# Patient Record
Sex: FEMALE | Race: BLACK OR AFRICAN AMERICAN
[De-identification: names, ages, dates, MRNs, and addresses within clinical notes are randomized per-mention and may not be internally consistent; named-entity substitution may affect disease eponyms.]

---

## 2019-10-11 ENCOUNTER — APPOINTMENT (OUTPATIENT)
Dept: ORTHOPEDIC SURGERY | Facility: CLINIC | Age: 79
End: 2019-10-11
Payer: MEDICARE

## 2019-10-11 VITALS — WEIGHT: 238 LBS | BODY MASS INDEX: 49.96 KG/M2 | HEIGHT: 58 IN

## 2019-10-11 DIAGNOSIS — Z78.9 OTHER SPECIFIED HEALTH STATUS: ICD-10-CM

## 2019-10-11 PROBLEM — Z00.00 ENCOUNTER FOR PREVENTIVE HEALTH EXAMINATION: Status: ACTIVE | Noted: 2019-10-11

## 2019-10-11 PROCEDURE — 99203 OFFICE O/P NEW LOW 30 MIN: CPT

## 2019-10-11 PROCEDURE — 73560 X-RAY EXAM OF KNEE 1 OR 2: CPT | Mod: 50

## 2019-10-11 NOTE — PHYSICAL EXAM
[de-identified] : \par Physical Examination\par General: well nourished, in no acute distress, alert and oriented to person, place and time\par Psychiatric: normal mood and affect, no abnormal movements or speech patterns\par Eyes: vision intact With glasses\par Throat: no thyromegaly\par Lymph: no enlarged nodes, no lymphedema in extremity\par Respiratory: no wheezing, no shortness of breath with ambulation\par Cardiac: no cardiac leg swelling, 2+ peripheral pulses\par Neurology: normal gross sensation in extremities to light touch\par Abdomen: soft, non-tender, tympanic, no masses\par \par Musculoskeletal Examination\par Ambulation	Wheelchair bound\par limited exam\par Knee			Right			Left\par General\par      Swelling/Deformity	normal			normal	\par      Skin			normal			normal\par      Erythema		-			-\par      Standing Alignment	neutral			neutral\par      Effusion		none			none\par Range of Motion&\par  \par \par Sensation\par      Deep Peroneal	normal			normal\par      Superficial Peroneal 	normal			normal\par      Sural  		normal			normal\par      Posterior Tibial 	normal			normal\par      Saphneous 		normal			normal\par Pulses\par      DP			2+			2+\par \par  [de-identified] : \par 2 views of the affected bilateral knee (supine AP, supine 0degree lateral,)\par were ordered, obtained and evaluated by myself today and\par demonstrate:\par \par RIGHT\par There is Severe Symmetric narrowing\par Moderate/Large osteophytic lipping\par Trace suprapatellar effusion\par Normal soft tissue density\par demineralized bone\par \par LEFT\par There is Severe Symmetric narrowing\par Moderate/Large osteophytic lipping\par Trace suprapatellar effusion\par Normal soft tissue density\par demineralized bone\par \par

## 2019-10-11 NOTE — DISCUSSION/SUMMARY
[de-identified] : Discussion/Summary\par Bilateral knee severe osteoarthritis\par \par The patient and I discussed the causes and progression of degenerative joint disease of the knee. Models, diagrams and drawings were used in the discussion. Treatment can include conservative non-operative management and surgical options. Conservative management includes weight loss, activity modification, physical therapy to improve motion and strength in the muscles around the knee and the body's core, PO and topical NSAIDs, corticosteroid and/or viscosupplementation intra-articular injections. If the patient fails to improve with non-operative management, surgical management is possible. Depending upon the patient's age, BMI, activity level, degree and location of arthrosis different surgical options are possible including arthroscopic debridement with chondroplasty, high-tibial osteotomy, unicondylar/partial arthroplasty, and total joint arthroplasty.\par \par given non-ambulatory status and patient age/height and BMI, i recommend patient see a total knee replacement specialist. I believe she will have best result w TKA specilaist\par \par recommended Dr Kendall.\par \par The patient verifies their understanding the the visit, diagnosis and plan. They agree with the treatment plan and will contact the office with any questions or problems.\par Follow up\par PRN. \par

## 2019-10-11 NOTE — HISTORY OF PRESENT ILLNESS
[de-identified] : CC Bilateral knee\par \par HP KEELY , 79 year old F RHD 6 months s/p bilateral ankle surgery presenting with chronic onset of 9 years of activity related pain in the anterior Bilateral knee without injury.  Patient has been wheelchair bound for the past two years.  The pain is worse, and rated a 10# out of 10, described as Aching /Sharp, without radiation. Rest makes the pain better and bending/standing makes the pain worse. The patient reports associated symptoms of weakness/swelling. The patient Reports pain at night affecting sleep, and reports similar pain previously.\par \par The patient has tried the following treatments:\par Activity modification	+ Wheelchair x 2 years\par Ice/Compression  	               + \par Braces    		                -\par Nsaids    		                +\par Physical Therapy  	               +\par Cortisone Injection	               + Left only 1 month ago 3 weeks 10%\par Visco Injection		-\par Arthroscopy		-\par \par Review of Systems is positive for the above musculoskeletal symptoms and is otherwise non-contributory for general, constitutional, psychiatric, neurologic, HEENT, cardiac, respiratory, gastrointestinal, reproductive, lymphatic, and dermatologic complaints.\par \par Consult by Dr Shekhar Campoverde\par

## 2019-11-14 ENCOUNTER — APPOINTMENT (OUTPATIENT)
Dept: ORTHOPEDIC SURGERY | Facility: CLINIC | Age: 79
End: 2019-11-14
Payer: MEDICARE

## 2019-11-14 VITALS — DIASTOLIC BLOOD PRESSURE: 94 MMHG | HEART RATE: 86 BPM | SYSTOLIC BLOOD PRESSURE: 156 MMHG

## 2019-11-14 DIAGNOSIS — M17.0 BILATERAL PRIMARY OSTEOARTHRITIS OF KNEE: ICD-10-CM

## 2019-11-14 DIAGNOSIS — Z78.9 OTHER SPECIFIED HEALTH STATUS: ICD-10-CM

## 2019-11-14 DIAGNOSIS — Z99.3 DEPENDENCE ON WHEELCHAIR: ICD-10-CM

## 2019-11-14 PROCEDURE — 99214 OFFICE O/P EST MOD 30 MIN: CPT

## 2019-11-18 PROBLEM — Z78.9 DENIES ALCOHOL CONSUMPTION: Status: ACTIVE | Noted: 2019-11-18

## 2019-11-18 PROBLEM — Z99.3 WHEELCHAIR BOUND: Status: ACTIVE | Noted: 2019-11-18

## 2019-11-18 PROBLEM — M17.0 PRIMARY OSTEOARTHRITIS OF BOTH KNEES: Status: ACTIVE | Noted: 2019-10-11

## 2019-11-18 NOTE — HISTORY OF PRESENT ILLNESS
[Worsening] : worsening [de-identified] : Ms. KEELY GOMEZ is a 79 year old female presents for evaluation of bilateral knee pain. She notes she has had pain for many years, now worsened since 2018. She notes she has been unable to bend her knees since April of this year. She notes she has been in a rehab facility, following ankle surgery, and has since seen a decrease in mobility of the knees since the ankle surgery.  She notes she has anterior knee pain, constant, worsened by flexion of the knees. She notes she no longer walks due to the limitation in movement and pain. She transfers with a Codie lift. She admits to physical therapy in the past, along with a cortisone injection one month ago with only mild relief for a couple days. \par She presents to discuss total knee replacement.

## 2019-11-18 NOTE — DISCUSSION/SUMMARY
[de-identified] : Bilateral knee advanced degenerative joint disease, wheelchair bound, with extension contractures. \par The natural history and treatment of degenerative arthritis was discussed with the patient at length today. The spectrum of treatment including nonoperative modalities to surgical intervention was elucidated. Noninvasive and nonoperative treatment modalities include weight reduction, activity modification with low impact exercise,  as needed use of acetaminophen or anti-inflammatory medications if tolerated, glucosamine/chondroitin supplements, and physical therapy. Further treatments can include corticosteroid injection and the use of viscosupplementation with hyaluronic acid injections. Definitive surgical treatment can certainly include total joint arthroplasty also. The risks and benefits of each treatment options was discussed and all questions were answered.\par At this time we discussed her condition in detail, and in my opinion she is not a candidate for surgery.  We dicussed that she is not likely to regain motion in the knees, as she has had contractures for a long period of time.  She has been advised that surgery will likely not change her status, and likely make her worse. She has been recommended to continue with conservative management of her symptoms. She states she would like to see another physician at [facility], and I recommended getting a couple different opinions. \par She may follow up as needed. \par

## 2019-11-18 NOTE — PHYSICAL EXAM
[Wheelchair] : uses a wheelchair [de-identified] : On general examination the patient is adequately groomed and nourished. The patient arrives in a wheelchair. The vital parameters are as recorded. \par There is no lymphedema or diffuse swelling, no varicose veins, no skin warmth/erythema/scars/swelling, no ulcers and no palpable lymph nodes or masses in both lower extremities. Bilateral pedal pulses are well palpable.\par Upper Extremity:\par Both right and left upper extremities are unremarkable in terms of skin rash, lesions, pigmentation, redness, tenderness, swelling, joint instability, abnormal deformity or crepitus. The overall range of motion, sensation, motor tone and strength testing are normal.\par \par Bilateral Knee Exam: \par The gait is not assessed. \par Knee alignment:      slightly varus. extension contractures. \par Both knees demonstrate no scars and the skin has no warmth, erythema, swelling or tenderness. \par Both knees have a range of motion of\par Extension:                    Right 0 degrees     Left 0 degrees\par Flexion:                                   Right 15 degrees      Left 15 degrees\par There is bilateral knee medial joint line tenderness. no effusion\par There is no mediolateral laxity and no anteroposterior instability. \par Patella compression test is positive and patellofemoral tracking is normal with no lateral subluxation, apprehension or instability. \par Right knee quadriceps and hamstrings power is 4+.\par Left knee quadriceps and hamstrings power is 4+.\par \par Neurology:\par The patient is alert and oriented in person, place and time. The mood is calm and affect is normal.\par Testing for coordination including Rhomberg's Test and Finger-Nose Test, sensation, motor tone and power and deep tendon reflexes in both lower extremities is normal.\par  [de-identified] : Imaging reviewed taken previously. \par Radiographs of the bilateral knees reveal advanced degenerative joint disease with medial and lateral joint line narrowing, osteophyte formation. There is demineralized bone secondary to nonweightbearing status.

## 2020-06-16 ENCOUNTER — INPATIENT (INPATIENT)
Facility: HOSPITAL | Age: 80
LOS: 6 days | Discharge: NURSING, REHAB OR LONG-TERM CARE | DRG: 177 | End: 2020-06-23
Attending: INTERNAL MEDICINE | Admitting: INTERNAL MEDICINE
Payer: MEDICARE

## 2020-06-16 VITALS
HEART RATE: 89 BPM | HEIGHT: 64 IN | WEIGHT: 184.97 LBS | RESPIRATION RATE: 16 BRPM | OXYGEN SATURATION: 98 % | TEMPERATURE: 100 F

## 2020-06-16 DIAGNOSIS — Z71.89 OTHER SPECIFIED COUNSELING: ICD-10-CM

## 2020-06-16 DIAGNOSIS — N17.9 ACUTE KIDNEY FAILURE, UNSPECIFIED: ICD-10-CM

## 2020-06-16 DIAGNOSIS — D52.9 FOLATE DEFICIENCY ANEMIA, UNSPECIFIED: ICD-10-CM

## 2020-06-16 DIAGNOSIS — K21.9 GASTRO-ESOPHAGEAL REFLUX DISEASE WITHOUT ESOPHAGITIS: ICD-10-CM

## 2020-06-16 DIAGNOSIS — K59.00 CONSTIPATION, UNSPECIFIED: ICD-10-CM

## 2020-06-16 DIAGNOSIS — J18.9 PNEUMONIA, UNSPECIFIED ORGANISM: ICD-10-CM

## 2020-06-16 DIAGNOSIS — I10 ESSENTIAL (PRIMARY) HYPERTENSION: ICD-10-CM

## 2020-06-16 DIAGNOSIS — H04.321 ACUTE DACRYOCYSTITIS OF RIGHT LACRIMAL PASSAGE: ICD-10-CM

## 2020-06-16 DIAGNOSIS — Z29.9 ENCOUNTER FOR PROPHYLACTIC MEASURES, UNSPECIFIED: ICD-10-CM

## 2020-06-16 DIAGNOSIS — E87.0 HYPEROSMOLALITY AND HYPERNATREMIA: ICD-10-CM

## 2020-06-16 DIAGNOSIS — Z90.710 ACQUIRED ABSENCE OF BOTH CERVIX AND UTERUS: Chronic | ICD-10-CM

## 2020-06-16 DIAGNOSIS — Z98.890 OTHER SPECIFIED POSTPROCEDURAL STATES: Chronic | ICD-10-CM

## 2020-06-16 LAB
ALBUMIN SERPL ELPH-MCNC: 3.1 G/DL — LOW (ref 3.5–5)
ALP SERPL-CCNC: 59 U/L — SIGNIFICANT CHANGE UP (ref 40–120)
ALT FLD-CCNC: 14 U/L DA — SIGNIFICANT CHANGE UP (ref 10–60)
ANION GAP SERPL CALC-SCNC: 4 MMOL/L — LOW (ref 5–17)
APPEARANCE UR: CLEAR — SIGNIFICANT CHANGE UP
APTT BLD: 34.7 SEC — SIGNIFICANT CHANGE UP (ref 27.5–36.3)
AST SERPL-CCNC: 24 U/L — SIGNIFICANT CHANGE UP (ref 10–40)
BILIRUB SERPL-MCNC: 0.8 MG/DL — SIGNIFICANT CHANGE UP (ref 0.2–1.2)
BILIRUB UR-MCNC: NEGATIVE — SIGNIFICANT CHANGE UP
BUN SERPL-MCNC: 35 MG/DL — HIGH (ref 7–18)
CALCIUM SERPL-MCNC: 8.3 MG/DL — LOW (ref 8.4–10.5)
CHLORIDE SERPL-SCNC: 100 MMOL/L — SIGNIFICANT CHANGE UP (ref 96–108)
CO2 SERPL-SCNC: 43 MMOL/L — HIGH (ref 22–31)
COLOR SPEC: YELLOW — SIGNIFICANT CHANGE UP
CREAT SERPL-MCNC: 1.73 MG/DL — HIGH (ref 0.5–1.3)
DIFF PNL FLD: NEGATIVE — SIGNIFICANT CHANGE UP
GLUCOSE SERPL-MCNC: 82 MG/DL — SIGNIFICANT CHANGE UP (ref 70–99)
GLUCOSE UR QL: NEGATIVE — SIGNIFICANT CHANGE UP
HCT VFR BLD CALC: 41.9 % — SIGNIFICANT CHANGE UP (ref 34.5–45)
HGB BLD-MCNC: 12 G/DL — SIGNIFICANT CHANGE UP (ref 11.5–15.5)
INR BLD: 1.28 RATIO — HIGH (ref 0.88–1.16)
KETONES UR-MCNC: NEGATIVE — SIGNIFICANT CHANGE UP
LEUKOCYTE ESTERASE UR-ACNC: NEGATIVE — SIGNIFICANT CHANGE UP
MCHC RBC-ENTMCNC: 28.6 GM/DL — LOW (ref 32–36)
MCHC RBC-ENTMCNC: 29 PG — SIGNIFICANT CHANGE UP (ref 27–34)
MCV RBC AUTO: 101.2 FL — HIGH (ref 80–100)
NITRITE UR-MCNC: NEGATIVE — SIGNIFICANT CHANGE UP
NRBC # BLD: 0 /100 WBCS — SIGNIFICANT CHANGE UP (ref 0–0)
NT-PROBNP SERPL-SCNC: 374 PG/ML — SIGNIFICANT CHANGE UP (ref 0–450)
PH UR: 5 — SIGNIFICANT CHANGE UP (ref 5–8)
PLATELET # BLD AUTO: 209 K/UL — SIGNIFICANT CHANGE UP (ref 150–400)
POTASSIUM SERPL-MCNC: 3.7 MMOL/L — SIGNIFICANT CHANGE UP (ref 3.5–5.3)
POTASSIUM SERPL-SCNC: 3.7 MMOL/L — SIGNIFICANT CHANGE UP (ref 3.5–5.3)
PROT SERPL-MCNC: 6.9 G/DL — SIGNIFICANT CHANGE UP (ref 6–8.3)
PROT UR-MCNC: NEGATIVE — SIGNIFICANT CHANGE UP
PROTHROM AB SERPL-ACNC: 14.6 SEC — HIGH (ref 10–12.9)
RBC # BLD: 4.14 M/UL — SIGNIFICANT CHANGE UP (ref 3.8–5.2)
RBC # FLD: 14 % — SIGNIFICANT CHANGE UP (ref 10.3–14.5)
SODIUM SERPL-SCNC: 147 MMOL/L — HIGH (ref 135–145)
SP GR SPEC: 1.01 — SIGNIFICANT CHANGE UP (ref 1.01–1.02)
TROPONIN I SERPL-MCNC: <0.015 NG/ML — SIGNIFICANT CHANGE UP (ref 0–0.04)
UROBILINOGEN FLD QL: NEGATIVE — SIGNIFICANT CHANGE UP
WBC # BLD: 4.99 K/UL — SIGNIFICANT CHANGE UP (ref 3.8–10.5)
WBC # FLD AUTO: 4.99 K/UL — SIGNIFICANT CHANGE UP (ref 3.8–10.5)

## 2020-06-16 PROCEDURE — 99285 EMERGENCY DEPT VISIT HI MDM: CPT

## 2020-06-16 PROCEDURE — 74176 CT ABD & PELVIS W/O CONTRAST: CPT | Mod: 26

## 2020-06-16 PROCEDURE — 71250 CT THORAX DX C-: CPT | Mod: 26

## 2020-06-16 PROCEDURE — 71045 X-RAY EXAM CHEST 1 VIEW: CPT | Mod: 26

## 2020-06-16 RX ORDER — HYDROCORTISONE 1 %
1 OINTMENT (GRAM) TOPICAL AT BEDTIME
Refills: 0 | Status: DISCONTINUED | OUTPATIENT
Start: 2020-06-16 | End: 2020-06-23

## 2020-06-16 RX ORDER — MULTIVIT-MIN/FERROUS GLUCONATE 9 MG/15 ML
1 LIQUID (ML) ORAL DAILY
Refills: 0 | Status: DISCONTINUED | OUTPATIENT
Start: 2020-06-16 | End: 2020-06-23

## 2020-06-16 RX ORDER — SODIUM CHLORIDE 9 MG/ML
500 INJECTION INTRAMUSCULAR; INTRAVENOUS; SUBCUTANEOUS ONCE
Refills: 0 | Status: COMPLETED | OUTPATIENT
Start: 2020-06-16 | End: 2020-06-16

## 2020-06-16 RX ORDER — ASPIRIN/CALCIUM CARB/MAGNESIUM 324 MG
325 TABLET ORAL DAILY
Refills: 0 | Status: DISCONTINUED | OUTPATIENT
Start: 2020-06-16 | End: 2020-06-23

## 2020-06-16 RX ORDER — HEPARIN SODIUM 5000 [USP'U]/ML
5000 INJECTION INTRAVENOUS; SUBCUTANEOUS EVERY 8 HOURS
Refills: 0 | Status: DISCONTINUED | OUTPATIENT
Start: 2020-06-16 | End: 2020-06-23

## 2020-06-16 RX ORDER — CHOLECALCIFEROL (VITAMIN D3) 125 MCG
1000 CAPSULE ORAL DAILY
Refills: 0 | Status: DISCONTINUED | OUTPATIENT
Start: 2020-06-16 | End: 2020-06-23

## 2020-06-16 RX ORDER — AMPICILLIN SODIUM AND SULBACTAM SODIUM 250; 125 MG/ML; MG/ML
3 INJECTION, POWDER, FOR SUSPENSION INTRAMUSCULAR; INTRAVENOUS ONCE
Refills: 0 | Status: COMPLETED | OUTPATIENT
Start: 2020-06-16 | End: 2020-06-16

## 2020-06-16 RX ORDER — AZITHROMYCIN 500 MG/1
500 TABLET, FILM COATED ORAL EVERY 24 HOURS
Refills: 0 | Status: DISCONTINUED | OUTPATIENT
Start: 2020-06-16 | End: 2020-06-22

## 2020-06-16 RX ORDER — ONDANSETRON 8 MG/1
4 TABLET, FILM COATED ORAL ONCE
Refills: 0 | Status: DISCONTINUED | OUTPATIENT
Start: 2020-06-16 | End: 2020-06-23

## 2020-06-16 RX ORDER — FOLIC ACID 0.8 MG
1 TABLET ORAL DAILY
Refills: 0 | Status: DISCONTINUED | OUTPATIENT
Start: 2020-06-16 | End: 2020-06-23

## 2020-06-16 RX ORDER — MORPHINE SULFATE 50 MG/1
2 CAPSULE, EXTENDED RELEASE ORAL ONCE
Refills: 0 | Status: DISCONTINUED | OUTPATIENT
Start: 2020-06-16 | End: 2020-06-16

## 2020-06-16 RX ORDER — PANTOPRAZOLE SODIUM 20 MG/1
40 TABLET, DELAYED RELEASE ORAL
Refills: 0 | Status: DISCONTINUED | OUTPATIENT
Start: 2020-06-16 | End: 2020-06-23

## 2020-06-16 RX ORDER — LEVOTHYROXINE SODIUM 125 MCG
75 TABLET ORAL DAILY
Refills: 0 | Status: DISCONTINUED | OUTPATIENT
Start: 2020-06-16 | End: 2020-06-18

## 2020-06-16 RX ORDER — CIPROFLOXACIN HCL 0.3 %
1 DROPS OPHTHALMIC (EYE)
Refills: 0 | Status: DISCONTINUED | OUTPATIENT
Start: 2020-06-16 | End: 2020-06-23

## 2020-06-16 RX ORDER — CEFTRIAXONE 500 MG/1
1000 INJECTION, POWDER, FOR SOLUTION INTRAMUSCULAR; INTRAVENOUS EVERY 24 HOURS
Refills: 0 | Status: COMPLETED | OUTPATIENT
Start: 2020-06-16 | End: 2020-06-20

## 2020-06-16 RX ORDER — ACETAMINOPHEN 500 MG
975 TABLET ORAL ONCE
Refills: 0 | Status: COMPLETED | OUTPATIENT
Start: 2020-06-16 | End: 2020-06-16

## 2020-06-16 RX ORDER — POLYETHYLENE GLYCOL 3350 17 G/17G
17 POWDER, FOR SOLUTION ORAL DAILY
Refills: 0 | Status: DISCONTINUED | OUTPATIENT
Start: 2020-06-16 | End: 2020-06-19

## 2020-06-16 RX ORDER — ACETAMINOPHEN 500 MG
650 TABLET ORAL EVERY 4 HOURS
Refills: 0 | Status: DISCONTINUED | OUTPATIENT
Start: 2020-06-16 | End: 2020-06-23

## 2020-06-16 RX ADMIN — Medication 975 MILLIGRAM(S): at 17:32

## 2020-06-16 RX ADMIN — CEFTRIAXONE 100 MILLIGRAM(S): 500 INJECTION, POWDER, FOR SOLUTION INTRAMUSCULAR; INTRAVENOUS at 21:37

## 2020-06-16 RX ADMIN — HEPARIN SODIUM 5000 UNIT(S): 5000 INJECTION INTRAVENOUS; SUBCUTANEOUS at 21:31

## 2020-06-16 RX ADMIN — AMPICILLIN SODIUM AND SULBACTAM SODIUM 200 GRAM(S): 250; 125 INJECTION, POWDER, FOR SUSPENSION INTRAMUSCULAR; INTRAVENOUS at 17:32

## 2020-06-16 RX ADMIN — AZITHROMYCIN 255 MILLIGRAM(S): 500 TABLET, FILM COATED ORAL at 23:08

## 2020-06-16 RX ADMIN — SODIUM CHLORIDE 500 MILLILITER(S): 9 INJECTION INTRAMUSCULAR; INTRAVENOUS; SUBCUTANEOUS at 17:33

## 2020-06-16 NOTE — H&P ADULT - PROBLEM SELECTOR PLAN 2
-presented with discharge from both eyes with no noted conjunctivel erythema however has eema of eyelids s/o blepharitis with swelling at lateral aspect of R eye s/o dacrocystitis  - -presented with discharge from both eyes with no noted conjunctivel erythema however has edema of eyelids s/o blepharitis with swelling at lateral aspect of R eye with concern for dacrocystitis  -on iv antibiotics as above for CAP, will start cipro eye drops+aritifical tears  -f/u food allergy and RAST tests

## 2020-06-16 NOTE — ED ADULT NURSE NOTE - NSFALLRSKASSESSDT_ED_ALL_ED
16-Jun-2020 14:26

## 2020-06-16 NOTE — ED PROVIDER NOTE - OBJECTIVE STATEMENT
79 year old female that has a history of htn, anemia, gerd, pressure ulcers, arthritis and copd came to the ED because of worsening SOB with decreased appetite, and swelling next to the right eye. No chest pain, no abd pain, no urinary complaints, no rash, no headache.

## 2020-06-16 NOTE — H&P ADULT - PROBLEM SELECTOR PLAN 6
pt reports 10 day hx of constipation and poor appetite  ct scan with no fecal load/impaction  will start on miralax for now

## 2020-06-16 NOTE — ED ADULT TRIAGE NOTE - CCCP TRG CHIEF CMPLNT
57yo G0 w/ known stage IV endometrial adenocarcinoma s/p staging surgery '18 and 1 round of chemotherapy 2/19 admitted for management of symptomatic rectovaginal and enterovaginal fistulas, now with complex fistula associated bacteruria, hemodynamically stable and afebrile    1. ID: urine culture from 2/27 positive for ESBL E. coli, associated due to complex fistula, on bactrim DS BID on 3/1, repeat urine cx 3/6 w/ MRSA and proteus, ID consulted, awaiting recommendations  2. FEN/GI - NPO, IVH, replete electrolytes PRN, octreotide daily on TPN, s/p PICC placement 2/27, GI following recs appreciated, electrolytes repleted via TPN  3. PAIN - well controlled, tylenol, oxycodone, motrin PRN  4.  - joseph in place, intermittent periods of leakage of scant amount of stool per vagina, general surgery consulted for recommendations on management of rectovaginal/enterovaginal fistulas, no surgical intervention at this time, continue with nystatin powder and ointment for mons and labial irritation as well as zinc oxide  5. Endocrine- ISS while patient remains NPO, holding home metformin at this time  6. RESP-  Saturating well on room air, encouraged ISS  8. VTE prophylaxis - SCDs, ambulate as tolerated with PT, Lovenox 40qd, PT    9. PT recommending L[facility]O, pt initially did not want to go, now amenable  - PT consulted to walk patient, as patient needs to be ambulating daily and daily exercises   10. Palliative: palliative consulted, appreciate recommendations  - radiation oncology consulted, recommended biopsy to confirm recurrence before considering radiation therapy  - continue with Lexapro 10mg qhs for depressed mood shortness of breath

## 2020-06-16 NOTE — ED PROVIDER NOTE - CARE PLAN
Principal Discharge DX:	Dacryocystitis, acute, right  Secondary Diagnosis:	Failure to thrive in adult  Secondary Diagnosis:	Renal insufficiency

## 2020-06-16 NOTE — H&P ADULT - PROBLEM SELECTOR PLAN 1
-presented with dyspnea, -presented with dyspnea,, poor intake, afebrile, with no leucocytosis  -CT chest noted with consolidation/bronchogram in RLL  -Will start wilberto mcmanus  -F/u blood cultures, legionella and strep Ags -presented with dyspnea,, poor intake, afebrile, with no leucocytosis  -CT chest noted with consolidation/bronchogram in RLL  -Will start azithro, Rocephin  -Monitor respiratory status  -F/u blood cultures, legionella and strep Ags -presented with dyspnea,, poor intake, afebrile, with no leucocytosis, physical exam with pedal edema  -ekg noted NSR with no acute st-tw changes, trop negative, pro bnp 300  -CT chest noted with consolidation/bronchogram in RLL  -Will start azithro, Rocephin  -Monitor respiratory status, f/u echo  -F/u blood cultures, legionella and strep Ags

## 2020-06-16 NOTE — ED ADULT NURSE NOTE - OBJECTIVE STATEMENT
pt comes from [facility] w/ concern of swollen R eye x1 week and shortness of breath for months. Pt is poor historian.  Speaking in full clear sentences, breathing unlabored.  O2 mid-high 90's on 2 ltr n/c.

## 2020-06-16 NOTE — ED PROVIDER NOTE - CLINICAL SUMMARY MEDICAL DECISION MAKING FREE TEXT BOX
PT with failure to thrive, lost appetite has dacryocystitis, DW Dr Marvin, will give abx and admit. Abnormal renal function, from dehydration secondary to failure to thrive.

## 2020-06-16 NOTE — H&P ADULT - PROBLEM SELECTOR PLAN 3
-presented with bun/cr of 35/1.73, suspect prerenal corinna in setting of dehydration  -received iv fluids in ER,   -f/u bmp in am -presented with bun/cr of 35/1.73, suspect prerenal corinna in setting of dehydration  -received iv fluids in ER, will hold off lasix for now  -f/u bmp in am

## 2020-06-16 NOTE — H&P ADULT - HISTORY OF PRESENT ILLNESS
79F with HTN, folate deficiency anemia, GERD, LBP, pressure ulcers, OA, ulcerative colitis, hypothyroidism, sent to ER from [facility] of difficulty breathing and lethargy. Patient on my assessment is AAOx3, reports having discharge form her eyes since couple days with no headache or visual changes. She also endorses diffiuclt ybrathing since couple days with difficulty laying flat, requiring 2 pillows at night. Denies chest pain, palpitations, cough or sick contacts. Endorses swelling of both feet 79F with HTN, folate deficiency anemia, GERD, LBP, pressure ulcers, OA, ulcerative colitis, hypothyroidism, sent to ER from [facility] of difficulty breathing and lethargy. Patient on my assessment is AAOx3, reports having discharge form her eyes since couple days with no headache or visual changes. She also endorses difficulty breathing since couple days with difficulty laying flat, requiring 2 pillows at night. Denies chest pain, palpitations, cough or sick contacts. Endorses chronic swelling of both lower extremities. Patient also reports constipation since 10 days with decreased appetite but no nausea or vomiting.

## 2020-06-16 NOTE — H&P ADULT - NSHPPHYSICALEXAM_GEN_ALL_CORE
Vital Signs Last 24 Hrs  T(C): 36.8 (16 Jun 2020 15:29), Max: 37.6 (16 Jun 2020 13:54)  T(F): 98.2 (16 Jun 2020 15:29), Max: 99.7 (16 Jun 2020 13:54)  HR: 91 (16 Jun 2020 15:29) (89 - 91)  BP: 128/69 (16 Jun 2020 15:29) (128/69 - 131/62)  BP(mean): --  RR: 18 (16 Jun 2020 15:29) (16 - 18)  SpO2: 97% (16 Jun 2020 15:29) (96% - 98%)    PHYSICAL EXAM:  GENERAL: NAD, well-developed, elderly female  HEAD:  Atraumatic, Normocephalic  EYES: EOMI, PERRLA, purulent discharge from both eyes noted, conjunctive clear, edema of b/l upper eyelids, swelling over lateral aspect of R eye  NECK: Supple, No JVD  CHEST/LUNG: Clear to auscultation bilaterally, dcreased breath sounds over b/l bases; No wheeze  HEART: Regular rate and rhythm; No murmurs, rubs, or gallops  ABDOMEN: Soft, Nontender, Nondistended; Bowel sounds present  EXTREMITIES:, No clubbing, cyanosis; 1+ pitting edema of ble, ace +  PSYCH: AAOx3  NEUROLOGY: non-focal  SKIN: No rashes or lesions

## 2020-06-16 NOTE — H&P ADULT - ASSESSMENT
79F with HTN, folate deficiency anemia, GERD, LBP, pressure ulcers, OA, ulcerative colitis, hypothyroidism, sent to ER from [facility] of difficulty breathing and lethargy. Patient on my assessment is AAOx3, reports having discharge form her eyes since couple days with no headache or visual changes. She also endorses difficulty breathing since couple days with difficulty laying flat, requiring 2 pillows at night. Denies chest pain, palpitations, cough or sick contacts. Endorses chronic swelling of both lower extremities. Patient also reports constipation since 10 days with decreased appetite but no nausea or vomiting.

## 2020-06-16 NOTE — ED PROVIDER NOTE - EYES, MLM
Clear bilaterally, pupils equal, round and reactive to light. swelling lateral to the right upper eye, EOMI.

## 2020-06-16 NOTE — H&P ADULT - NSICDXPASTMEDICALHX_GEN_ALL_CORE_FT
PAST MEDICAL HISTORY:  GERD (gastroesophageal reflux disease)     Hypertension     Hypothyroidism     Osteoarthritis     Pain, low back     Pressure ulcer     Ulcerative colitis

## 2020-06-17 LAB
ANION GAP SERPL CALC-SCNC: 4 MMOL/L — LOW (ref 5–17)
BARLEY IGE QN: <0.1 KUA/L — SIGNIFICANT CHANGE UP
BASOPHILS # BLD AUTO: 0.01 K/UL — SIGNIFICANT CHANGE UP (ref 0–0.2)
BASOPHILS NFR BLD AUTO: 0.2 % — SIGNIFICANT CHANGE UP (ref 0–2)
BUN SERPL-MCNC: 34 MG/DL — HIGH (ref 7–18)
CALCIUM SERPL-MCNC: 8.3 MG/DL — LOW (ref 8.4–10.5)
CHERRY IGE QN: <0.1 KUA/L — SIGNIFICANT CHANGE UP
CHLORIDE SERPL-SCNC: 100 MMOL/L — SIGNIFICANT CHANGE UP (ref 96–108)
CO2 SERPL-SCNC: 42 MMOL/L — HIGH (ref 22–31)
CRAB IGE QN: <0.1 KUA/L — SIGNIFICANT CHANGE UP
CREAT SERPL-MCNC: 1.48 MG/DL — HIGH (ref 0.5–1.3)
CULTURE RESULTS: NO GROWTH — SIGNIFICANT CHANGE UP
DEPRECATED BARLEY IGE RAST QL: 0 — SIGNIFICANT CHANGE UP
DEPRECATED CHERRY IGE RAST QL: 0 — SIGNIFICANT CHANGE UP
DEPRECATED CRAB IGE RAST QL: 0 — SIGNIFICANT CHANGE UP
DEPRECATED CULTIVATED RYE IGE RAST QL: 0 — SIGNIFICANT CHANGE UP
DEPRECATED EGG WHITE IGE RAST QL: 0 — SIGNIFICANT CHANGE UP
DEPRECATED MILK IGE RAST QL: 0 — SIGNIFICANT CHANGE UP
DEPRECATED OAT IGE RAST QL: 0 — SIGNIFICANT CHANGE UP
DEPRECATED PEANUT IGE RAST QL: 0 — SIGNIFICANT CHANGE UP
DEPRECATED SOYBEAN IGE RAST QL: 0 — SIGNIFICANT CHANGE UP
DEPRECATED WHEAT IGE RAST QL: 0 — SIGNIFICANT CHANGE UP
EGG WHITE IGE QN: <0.1 KUA/L — SIGNIFICANT CHANGE UP
EOSINOPHIL # BLD AUTO: 0.14 K/UL — SIGNIFICANT CHANGE UP (ref 0–0.5)
EOSINOPHIL NFR BLD AUTO: 2.6 % — SIGNIFICANT CHANGE UP (ref 0–6)
GLUCOSE SERPL-MCNC: 87 MG/DL — SIGNIFICANT CHANGE UP (ref 70–99)
HCT VFR BLD CALC: 42.8 % — SIGNIFICANT CHANGE UP (ref 34.5–45)
HGB BLD-MCNC: 12.2 G/DL — SIGNIFICANT CHANGE UP (ref 11.5–15.5)
IMM GRANULOCYTES NFR BLD AUTO: 0.6 % — SIGNIFICANT CHANGE UP (ref 0–1.5)
LYMPHOCYTES # BLD AUTO: 1.36 K/UL — SIGNIFICANT CHANGE UP (ref 1–3.3)
LYMPHOCYTES # BLD AUTO: 25.7 % — SIGNIFICANT CHANGE UP (ref 13–44)
MCHC RBC-ENTMCNC: 28.5 GM/DL — LOW (ref 32–36)
MCHC RBC-ENTMCNC: 28.8 PG — SIGNIFICANT CHANGE UP (ref 27–34)
MCV RBC AUTO: 100.9 FL — HIGH (ref 80–100)
MILK IGE QN: <0.1 KUA/L — SIGNIFICANT CHANGE UP
MONOCYTES # BLD AUTO: 0.49 K/UL — SIGNIFICANT CHANGE UP (ref 0–0.9)
MONOCYTES NFR BLD AUTO: 9.2 % — SIGNIFICANT CHANGE UP (ref 2–14)
NEUTROPHILS # BLD AUTO: 3.27 K/UL — SIGNIFICANT CHANGE UP (ref 1.8–7.4)
NEUTROPHILS NFR BLD AUTO: 61.7 % — SIGNIFICANT CHANGE UP (ref 43–77)
NRBC # BLD: 0 /100 WBCS — SIGNIFICANT CHANGE UP (ref 0–0)
OAT IGE QN: <0.1 KUA/L — SIGNIFICANT CHANGE UP
PEANUT IGE QN: <0.1 KUA/L — SIGNIFICANT CHANGE UP
PLATELET # BLD AUTO: 217 K/UL — SIGNIFICANT CHANGE UP (ref 150–400)
POTASSIUM SERPL-MCNC: 3.6 MMOL/L — SIGNIFICANT CHANGE UP (ref 3.5–5.3)
POTASSIUM SERPL-SCNC: 3.6 MMOL/L — SIGNIFICANT CHANGE UP (ref 3.5–5.3)
RBC # BLD: 4.24 M/UL — SIGNIFICANT CHANGE UP (ref 3.8–5.2)
RBC # FLD: 14.2 % — SIGNIFICANT CHANGE UP (ref 10.3–14.5)
RYE IGE QN: <0.1 KUA/L — SIGNIFICANT CHANGE UP
SARS-COV-2 RNA SPEC QL NAA+PROBE: SIGNIFICANT CHANGE UP
SODIUM SERPL-SCNC: 146 MMOL/L — HIGH (ref 135–145)
SOYBEAN IGE QN: <0.1 KUA/L — SIGNIFICANT CHANGE UP
SPECIMEN SOURCE: SIGNIFICANT CHANGE UP
TOTAL IGE SMQN RAST: 20 KU/L — SIGNIFICANT CHANGE UP
WBC # BLD: 5.3 K/UL — SIGNIFICANT CHANGE UP (ref 3.8–10.5)
WBC # FLD AUTO: 5.3 K/UL — SIGNIFICANT CHANGE UP (ref 3.8–10.5)
WHEAT IGE QN: <0.1 KUA/L — SIGNIFICANT CHANGE UP

## 2020-06-17 RX ORDER — SODIUM CHLORIDE 9 MG/ML
1000 INJECTION, SOLUTION INTRAVENOUS
Refills: 0 | Status: DISCONTINUED | OUTPATIENT
Start: 2020-06-17 | End: 2020-06-23

## 2020-06-17 RX ADMIN — Medication 75 MICROGRAM(S): at 06:20

## 2020-06-17 RX ADMIN — SODIUM CHLORIDE 60 MILLILITER(S): 9 INJECTION, SOLUTION INTRAVENOUS at 18:56

## 2020-06-17 RX ADMIN — Medication 1 DROP(S): at 17:57

## 2020-06-17 RX ADMIN — Medication 1 DROP(S): at 06:21

## 2020-06-17 RX ADMIN — PANTOPRAZOLE SODIUM 40 MILLIGRAM(S): 20 TABLET, DELAYED RELEASE ORAL at 06:21

## 2020-06-17 RX ADMIN — Medication 1 APPLICATION(S): at 00:47

## 2020-06-17 RX ADMIN — HEPARIN SODIUM 5000 UNIT(S): 5000 INJECTION INTRAVENOUS; SUBCUTANEOUS at 17:54

## 2020-06-17 RX ADMIN — HEPARIN SODIUM 5000 UNIT(S): 5000 INJECTION INTRAVENOUS; SUBCUTANEOUS at 06:20

## 2020-06-17 RX ADMIN — HEPARIN SODIUM 5000 UNIT(S): 5000 INJECTION INTRAVENOUS; SUBCUTANEOUS at 21:52

## 2020-06-17 RX ADMIN — Medication 1 DROP(S): at 06:20

## 2020-06-17 RX ADMIN — CEFTRIAXONE 100 MILLIGRAM(S): 500 INJECTION, POWDER, FOR SOLUTION INTRAMUSCULAR; INTRAVENOUS at 21:51

## 2020-06-17 NOTE — PROGRESS NOTE ADULT - SUBJECTIVE AND OBJECTIVE BOX
Patient is a 79y old  Female who presents with a chief complaint of discharge from eyes, difficulty breathing (16 Jun 2020 19:28)    PATIENT IS SEEN AND EXAMINED IN MEDICAL FLOOR.    ALLERGIES:  iodine containing compounds (Unknown)  Milk (Unknown)  morphine (Unknown)  Orange Juice (Unknown)  Rice (Unknown)      Daily     Daily     VITALS:    Vital Signs Last 24 Hrs  T(C): 37.4 (17 Jun 2020 12:24), Max: 37.4 (17 Jun 2020 12:24)  T(F): 99.3 (17 Jun 2020 12:24), Max: 99.3 (17 Jun 2020 12:24)  HR: 80 (17 Jun 2020 12:24) (80 - 90)  BP: 118/88 (17 Jun 2020 12:24) (118/88 - 151/80)  BP(mean): 87 (16 Jun 2020 20:00) (87 - 87)  RR: 18 (17 Jun 2020 12:24) (17 - 19)  SpO2: 96% (17 Jun 2020 12:24) (93% - 100%)    LABS:    CBC Full  -  ( 17 Jun 2020 10:39 )  WBC Count : 5.30 K/uL  RBC Count : 4.24 M/uL  Hemoglobin : 12.2 g/dL  Hematocrit : 42.8 %  Platelet Count - Automated : 217 K/uL  Mean Cell Volume : 100.9 fl  Mean Cell Hemoglobin : 28.8 pg  Mean Cell Hemoglobin Concentration : 28.5 gm/dL  Auto Neutrophil # : 3.27 K/uL  Auto Lymphocyte # : 1.36 K/uL  Auto Monocyte # : 0.49 K/uL  Auto Eosinophil # : 0.14 K/uL  Auto Basophil # : 0.01 K/uL  Auto Neutrophil % : 61.7 %  Auto Lymphocyte % : 25.7 %  Auto Monocyte % : 9.2 %  Auto Eosinophil % : 2.6 %  Auto Basophil % : 0.2 %    PT/INR - ( 16 Jun 2020 15:10 )   PT: 14.6 sec;   INR: 1.28 ratio         PTT - ( 16 Jun 2020 15:10 )  PTT:34.7 sec  06-17    146<H>  |  100  |  34<H>  ----------------------------<  87  3.6   |  42<H>  |  1.48<H>    Ca    8.3<L>      17 Jun 2020 10:39    TPro  6.9  /  Alb  3.1<L>  /  TBili  0.8  /  DBili  x   /  AST  24  /  ALT  14  /  AlkPhos  59  06-16    CAPILLARY BLOOD GLUCOSE        CARDIAC MARKERS ( 16 Jun 2020 15:10 )  <0.015 ng/mL / x     / x     / x     / x          LIVER FUNCTIONS - ( 16 Jun 2020 15:10 )  Alb: 3.1 g/dL / Pro: 6.9 g/dL / ALK PHOS: 59 U/L / ALT: 14 U/L DA / AST: 24 U/L / GGT: x           Creatinine Trend: 1.48<--, 1.73<--  I&O's Summary    MEDICATIONS:    MEDICATIONS  (STANDING):  artificial  tears Solution 1 Drop(s) Both EYES two times a day  aspirin 325 milliGRAM(s) Oral daily  azithromycin  IVPB 500 milliGRAM(s) IV Intermittent every 24 hours  cefTRIAXone   IVPB 1000 milliGRAM(s) IV Intermittent every 24 hours  cholecalciferol 1000 Unit(s) Oral daily  ciprofloxacin  0.3% Ophthalmic Solution 1 Drop(s) Both EYES two times a day  folic acid 1 milliGRAM(s) Oral daily  heparin   Injectable 5000 Unit(s) SubCutaneous every 8 hours  hydrocortisone 2.5% Rectal Cream 1 Application(s) Rectal at bedtime  levothyroxine 75 MICROGram(s) Oral daily  multivitamin/minerals 1 Tablet(s) Oral daily  pantoprazole    Tablet 40 milliGRAM(s) Oral before breakfast  polyethylene glycol 3350 17 Gram(s) Oral daily      MEDICATIONS  (PRN):  acetaminophen   Tablet .. 650 milliGRAM(s) Oral every 4 hours PRN Mild Pain (1 - 3)  ondansetron Injectable 4 milliGRAM(s) IV Push once PRN Nausea and/or Vomiting      REVIEW OF SYSTEMS:                           ALL ROS DONE [ X   ]    CONSTITUTIONAL:  LETHARGIC [   ], FEVER [   ], UNRESPONSIVE [   ]  CVS:  CP  [   ], SOB, [   ], PALPITATIONS [   ], DIZZYNESS [   ]  RS: COUGH [   ], SPUTUM [   ]  GI: ABDOMINAL PAIN [   ], NAUSEA [   ], VOMITINGS [   ], DIARRHEA [   ], CONSTIPATION [   ]  :  DYSURIA [   ], NOCTURIA [   ], INCREASED FREQUENCY [   ], DRIBLING [   ],  SKELETAL: PAINFUL JOINTS [   ], SWOLLEN JOINTS [   ], NECK ACHE [   ], LOW BACK ACHE [   ],  SKIN : ULCERS [   ], RASH [   ], ITCHING [   ]  CNS: HEAD ACHE [   ], DOUBLE VISION [   ], BLURRED VISION [   ], AMS / CONFUSION [   ], SEIZURES [   ], WEAKNESS [   ],TINGLING / NUMBNESS [   ]    PHYSICAL EXAMINATION:  GENERAL APPEARANCE: NO DISTRESS  HEENT:  NO PALLOR, NO  JVD,  NO   NODES, NECK SUPPLE, RIGHT EYE WITH ERYTHEMA AND DRAINAGE  CVS: S1 +, S2 +,   RS: AEEB,  OCCASIONAL  RALES +,   NO RONCHI, RLL CRACKLES  ABD: SOFT, NT, NO, BS +  EXT: NO PE  SKIN: WARM,   SKELETAL:  ROM ACCEPTABLE  CNS:  AAO X  1-2,   DEFICITS    RADIOLOGY :    < from: CT Chest No Cont (06.16.20 @ 18:40) >  IMPRESSION:     RIGHT lower lobe of posterior basilar segmental airspace consolidation. Increased interstitial markings in the periphery which may indicate chronic interstitial fibrosis. No pleural effusion.  Gross cardiomegaly. Heavily calcified mitral annulus.    No bowel obstruction or fecal impaction.  Cholelithiasis without secondary signs of acute cholecystitis.  No fracture  Remaining abdominal pelvic viscera unremarkable.    < end of copied text >    ASSESSMENT :     Acute dacryocystitis of right lacrimal sac  Pressure ulcer  Pain, low back  Ulcerative colitis  Osteoarthritis  Hypothyroidism  GERD (gastroesophageal reflux disease)  Hypertension  History of ankle surgery  H/O: hysterectomy    PLAN:  HPI:  79F with HTN, folate deficiency anemia, GERD, LBP, pressure ulcers, OA, ulcerative colitis, hypothyroidism, sent to ER from [facility] of difficulty breathing and lethargy. Patient on my assessment is AAOx3, reports having discharge form her eyes since couple days with no headache or visual changes. She also endorses difficulty breathing since couple days with difficulty laying flat, requiring 2 pillows at night. Denies chest pain, palpitations, cough or sick contacts. Endorses chronic swelling of both lower extremities. Patient also reports constipation since 10 days with decreased appetite but no nausea or vomiting. (16 Jun 2020 19:28)    # RIGHT LOWER LOBE INFILTRATE S/T COMMUNITY ACQUIRED PNEUMONIA - ON ROCEPHIN AND AZITHROMYCIN, F/U BCX  # DACROCYSTITIS, RIGHT EYE - STARTED ON CIPROFLOXACIN EYE DROPS  # SAUL SUSPECT S/T PRERENAL AZOTEMIA  # CONSTIPATION - PLACED ON MIRALAX  # ULCERATIVE COLITIS  # PATIENT IS HIGH RISK FOR DEVELOPING PRESSURE ULCERS AND WORSENING OF PRESSURE ULCERS DESPITE PREVENTIVE MEASURES IN PLACE  # GI AND DVT PPX    NADINERENETTA MONOTYA M.D.

## 2020-06-17 NOTE — CHART NOTE - NSCHARTNOTEFT_GEN_A_CORE
EVENT:      80 y/o female with HTN, folate deficiency anemia, GERD, LBP, pressure ulcers, OA, ulcerative colitis, hypothyroidism, sent to ER from [facility] of difficulty breathing and lethargy. Patient was AAOx3, reported having discharge from her eyes since couple days with no headache or visual changes. She also endorsed difficulty breathing since couple days with difficulty laying flat, requiring 2 pillows at night. Patient was admitted with dx. CAP. Afebrile. She's on Zosyn IV and Rocephin IV.   At6/17/20, 10 : 30 pm, patient c/o nausea and requested meds.   OBJECTIVE:  Vital Signs Last 24 Hrs  T(C): 37.1 (16 Jun 2020 20:00), Max: 37.6 (16 Jun 2020 13:54)  T(F): 98.8 (16 Jun 2020 20:00), Max: 99.7 (16 Jun 2020 13:54)  HR: 90 (16 Jun 2020 20:00) (89 - 91)  BP: 135/73 (16 Jun 2020 20:00) (128/69 - 135/73)  BP(mean): 87 (16 Jun 2020 20:00) (87 - 87)  RR: 18 (16 Jun 2020 20:00) (16 - 18)  SpO2: 98% (16 Jun 2020 20:00) (96% - 98%)    FOCUSED PHYSICAL EXAM:    LABS:                        12.0   4.99  )-----------( 209      ( 16 Jun 2020 15:10 )             41.9   CARDIAC MARKERS ( 16 Jun 2020 15:10 )  <0.015 ng/mL / x     / x     / x     / x        06-16    147<H>  |  100  |  35<H>  ----------------------------<  82  3.7   |  43<H>  |  1.73<H>    Ca    8.3<L>      16 Jun 2020 15:10    TPro  6.9  /  Alb  3.1<L>  /  TBili  0.8  /  DBili  x   /  AST  24  /  ALT  14  /  AlkPhos  59  06-16    PLAN: 1. Zofran 4 mg IV x12 dose PRN for c/o nausea/vomiting,  2. Blood culture x 2 sets ordered.     FOLLOW UP / RESULT: Reevaluate patient, 2. maintain safety and comfort.

## 2020-06-17 NOTE — PATIENT PROFILE ADULT - DISASTER - INDICATE TYPE
Power of /Do Not Resuscitate (DNR)/Health Care Proxy (HCP)/Medical Orders for Life-Sustaining Treatment (MOLST)

## 2020-06-18 LAB
24R-OH-CALCIDIOL SERPL-MCNC: 28.3 NG/ML — LOW (ref 30–80)
ANION GAP SERPL CALC-SCNC: 2 MMOL/L — LOW (ref 5–17)
BUN SERPL-MCNC: 21 MG/DL — HIGH (ref 7–18)
CALCIUM SERPL-MCNC: 8.1 MG/DL — LOW (ref 8.4–10.5)
CHLORIDE SERPL-SCNC: 100 MMOL/L — SIGNIFICANT CHANGE UP (ref 96–108)
CHOLEST SERPL-MCNC: 185 MG/DL — SIGNIFICANT CHANGE UP (ref 10–199)
CO2 SERPL-SCNC: 42 MMOL/L — HIGH (ref 22–31)
CREAT SERPL-MCNC: 1.25 MG/DL — SIGNIFICANT CHANGE UP (ref 0.5–1.3)
FOLATE SERPL-MCNC: >20 NG/ML — SIGNIFICANT CHANGE UP
GLUCOSE BLDC GLUCOMTR-MCNC: 90 MG/DL — SIGNIFICANT CHANGE UP (ref 70–99)
GLUCOSE SERPL-MCNC: 93 MG/DL — SIGNIFICANT CHANGE UP (ref 70–99)
HCT VFR BLD CALC: 39.1 % — SIGNIFICANT CHANGE UP (ref 34.5–45)
HDLC SERPL-MCNC: 64 MG/DL — SIGNIFICANT CHANGE UP
HGB BLD-MCNC: 11.3 G/DL — LOW (ref 11.5–15.5)
LIPID PNL WITH DIRECT LDL SERPL: 93 MG/DL — SIGNIFICANT CHANGE UP
MCHC RBC-ENTMCNC: 28.9 GM/DL — LOW (ref 32–36)
MCHC RBC-ENTMCNC: 28.9 PG — SIGNIFICANT CHANGE UP (ref 27–34)
MCV RBC AUTO: 100 FL — SIGNIFICANT CHANGE UP (ref 80–100)
NRBC # BLD: 0 /100 WBCS — SIGNIFICANT CHANGE UP (ref 0–0)
PLATELET # BLD AUTO: 192 K/UL — SIGNIFICANT CHANGE UP (ref 150–400)
POTASSIUM SERPL-MCNC: 3.2 MMOL/L — LOW (ref 3.5–5.3)
POTASSIUM SERPL-SCNC: 3.2 MMOL/L — LOW (ref 3.5–5.3)
RBC # BLD: 3.91 M/UL — SIGNIFICANT CHANGE UP (ref 3.8–5.2)
RBC # FLD: 14.5 % — SIGNIFICANT CHANGE UP (ref 10.3–14.5)
SARS-COV-2 RNA SPEC QL NAA+PROBE: DETECTED
SODIUM SERPL-SCNC: 144 MMOL/L — SIGNIFICANT CHANGE UP (ref 135–145)
TOTAL CHOLESTEROL/HDL RATIO MEASUREMENT: 2.9 RATIO — LOW (ref 3.3–7.1)
TRIGL SERPL-MCNC: 141 MG/DL — SIGNIFICANT CHANGE UP (ref 10–149)
TSH SERPL-MCNC: 9.42 UU/ML — HIGH (ref 0.34–4.82)
VIT B12 SERPL-MCNC: 1072 PG/ML — SIGNIFICANT CHANGE UP (ref 232–1245)
WBC # BLD: 5.05 K/UL — SIGNIFICANT CHANGE UP (ref 3.8–10.5)
WBC # FLD AUTO: 5.05 K/UL — SIGNIFICANT CHANGE UP (ref 3.8–10.5)

## 2020-06-18 RX ORDER — LEVOTHYROXINE SODIUM 125 MCG
100 TABLET ORAL DAILY
Refills: 0 | Status: DISCONTINUED | OUTPATIENT
Start: 2020-06-18 | End: 2020-06-23

## 2020-06-18 RX ORDER — POTASSIUM CHLORIDE 20 MEQ
40 PACKET (EA) ORAL EVERY 4 HOURS
Refills: 0 | Status: COMPLETED | OUTPATIENT
Start: 2020-06-18 | End: 2020-06-18

## 2020-06-18 RX ADMIN — Medication 1 DROP(S): at 05:53

## 2020-06-18 RX ADMIN — Medication 1 DROP(S): at 18:03

## 2020-06-18 RX ADMIN — HEPARIN SODIUM 5000 UNIT(S): 5000 INJECTION INTRAVENOUS; SUBCUTANEOUS at 05:54

## 2020-06-18 RX ADMIN — CEFTRIAXONE 100 MILLIGRAM(S): 500 INJECTION, POWDER, FOR SOLUTION INTRAMUSCULAR; INTRAVENOUS at 21:12

## 2020-06-18 RX ADMIN — AZITHROMYCIN 255 MILLIGRAM(S): 500 TABLET, FILM COATED ORAL at 01:49

## 2020-06-18 RX ADMIN — Medication 75 MICROGRAM(S): at 05:54

## 2020-06-18 RX ADMIN — Medication 325 MILLIGRAM(S): at 13:20

## 2020-06-18 RX ADMIN — Medication 1000 UNIT(S): at 13:20

## 2020-06-18 RX ADMIN — SODIUM CHLORIDE 60 MILLILITER(S): 9 INJECTION, SOLUTION INTRAVENOUS at 21:14

## 2020-06-18 RX ADMIN — PANTOPRAZOLE SODIUM 40 MILLIGRAM(S): 20 TABLET, DELAYED RELEASE ORAL at 06:13

## 2020-06-18 RX ADMIN — HEPARIN SODIUM 5000 UNIT(S): 5000 INJECTION INTRAVENOUS; SUBCUTANEOUS at 21:11

## 2020-06-18 NOTE — ADVANCED PRACTICE NURSE CONSULT - ASSESSMENT
This is a 79yr old female patient admitted for Acute Dacryocystitis of the R. Lacrimal Sac, to which upon assessment, the patient is without pressure injury.

## 2020-06-18 NOTE — PROGRESS NOTE ADULT - SUBJECTIVE AND OBJECTIVE BOX
Patient is a 79y old  Female who presents with a chief complaint of discharge from eyes, difficulty breathing (17 Jun 2020 18:42)    PATIENT IS SEEN AND EXAMINED IN MEDICAL FLOOR.    ALLERGIES:  iodine containing compounds (Unknown)  Milk (Unknown)  morphine (Unknown)  Orange Juice (Unknown)  Rice (Unknown)    VITALS:    Vital Signs Last 24 Hrs  T(C): 36.7 (18 Jun 2020 04:45), Max: 37.4 (17 Jun 2020 12:24)  T(F): 98 (18 Jun 2020 04:45), Max: 99.3 (17 Jun 2020 12:24)  HR: 87 (18 Jun 2020 04:45) (80 - 88)  BP: 152/73 (18 Jun 2020 04:45) (100/70 - 152/73)  BP(mean): --  RR: 16 (18 Jun 2020 04:45) (16 - 19)  SpO2: 98% (18 Jun 2020 04:45) (94% - 100%)    LABS:    CBC Full  -  ( 18 Jun 2020 07:10 )  WBC Count : 5.05 K/uL  RBC Count : 3.91 M/uL  Hemoglobin : 11.3 g/dL  Hematocrit : 39.1 %  Platelet Count - Automated : 192 K/uL  Mean Cell Volume : 100.0 fl  Mean Cell Hemoglobin : 28.9 pg  Mean Cell Hemoglobin Concentration : 28.9 gm/dL  Auto Neutrophil # : x  Auto Lymphocyte # : x  Auto Monocyte # : x  Auto Eosinophil # : x  Auto Basophil # : x  Auto Neutrophil % : x  Auto Lymphocyte % : x  Auto Monocyte % : x  Auto Eosinophil % : x  Auto Basophil % : x    PT/INR - ( 16 Jun 2020 15:10 )   PT: 14.6 sec;   INR: 1.28 ratio         PTT - ( 16 Jun 2020 15:10 )  PTT:34.7 sec  06-18    144  |  100  |  21<H>  ----------------------------<  93  3.2<L>   |  42<H>  |  1.25    Ca    8.1<L>      18 Jun 2020 07:10    TPro  6.9  /  Alb  3.1<L>  /  TBili  0.8  /  DBili  x   /  AST  24  /  ALT  14  /  AlkPhos  59  06-16    CAPILLARY BLOOD GLUCOSE      POCT Blood Glucose.: 90 mg/dL (18 Jun 2020 08:14)    CARDIAC MARKERS ( 16 Jun 2020 15:10 )  <0.015 ng/mL / x     / x     / x     / x          LIVER FUNCTIONS - ( 16 Jun 2020 15:10 )  Alb: 3.1 g/dL / Pro: 6.9 g/dL / ALK PHOS: 59 U/L / ALT: 14 U/L DA / AST: 24 U/L / GGT: x           Creatinine Trend: 1.25<--, 1.48<--, 1.73<--  I&O's Summary          .Urine Clean Catch (Midstream)  06-17 @ 01:00   No growth  --  --    MEDICATIONS:    MEDICATIONS  (STANDING):  artificial  tears Solution 1 Drop(s) Both EYES two times a day  aspirin 325 milliGRAM(s) Oral daily  azithromycin  IVPB 500 milliGRAM(s) IV Intermittent every 24 hours  cefTRIAXone   IVPB 1000 milliGRAM(s) IV Intermittent every 24 hours  cholecalciferol 1000 Unit(s) Oral daily  ciprofloxacin  0.3% Ophthalmic Solution 1 Drop(s) Both EYES two times a day  dextrose 5% + sodium chloride 0.45%. 1000 milliLiter(s) (60 mL/Hr) IV Continuous <Continuous>  folic acid 1 milliGRAM(s) Oral daily  heparin   Injectable 5000 Unit(s) SubCutaneous every 8 hours  hydrocortisone 2.5% Rectal Cream 1 Application(s) Rectal at bedtime  levothyroxine 100 MICROGram(s) Oral daily  multivitamin/minerals 1 Tablet(s) Oral daily  pantoprazole    Tablet 40 milliGRAM(s) Oral before breakfast  polyethylene glycol 3350 17 Gram(s) Oral daily  potassium chloride    Tablet ER 40 milliEquivalent(s) Oral every 4 hours      MEDICATIONS  (PRN):  acetaminophen   Tablet .. 650 milliGRAM(s) Oral every 4 hours PRN Mild Pain (1 - 3)  ondansetron Injectable 4 milliGRAM(s) IV Push once PRN Nausea and/or Vomiting      REVIEW OF SYSTEMS:                           ALL ROS DONE [ X   ]    CONSTITUTIONAL:  LETHARGIC [   ], FEVER [   ], UNRESPONSIVE [   ]  CVS:  CP  [   ], SOB, [   ], PALPITATIONS [   ], DIZZYNESS [   ]  RS: COUGH [   ], SPUTUM [   ]  GI: ABDOMINAL PAIN [   ], NAUSEA [   ], VOMITINGS [   ], DIARRHEA [   ], CONSTIPATION [   ]  :  DYSURIA [   ], NOCTURIA [   ], INCREASED FREQUENCY [   ], DRIBLING [   ],  SKELETAL: PAINFUL JOINTS [   ], SWOLLEN JOINTS [   ], NECK ACHE [   ], LOW BACK ACHE [   ],  SKIN : ULCERS [   ], RASH [   ], ITCHING [   ]  CNS: HEAD ACHE [   ], DOUBLE VISION [   ], BLURRED VISION [   ], AMS / CONFUSION [   ], SEIZURES [   ], WEAKNESS [   ],TINGLING / NUMBNESS [   ]      PHYSICAL EXAMINATION:  GENERAL APPEARANCE: NO DISTRESS  HEENT:  NO PALLOR, NO  JVD,  NO   NODES, NECK SUPPLE, RIGHT EYE WITH ERYTHEMA AND DRAINAGE  CVS: S1 +, S2 +,   RS: AEEB,  OCCASIONAL  RALES +,   NO RONCHI, RLL CRACKLES  ABD: SOFT, NT, NO, BS +  EXT: NO PE  SKIN: WARM,   SKELETAL:  ROM ACCEPTABLE  CNS:  AAO X  1-2,   DEFICITS    RADIOLOGY :    < from: CT Chest No Cont (06.16.20 @ 18:40) >  IMPRESSION:     RIGHT lower lobe of posterior basilar segmental airspace consolidation. Increased interstitial markings in the periphery which may indicate chronic interstitial fibrosis. No pleural effusion.  Gross cardiomegaly. Heavily calcified mitral annulus.    No bowel obstruction or fecal impaction.  Cholelithiasis without secondary signs of acute cholecystitis.  No fracture  Remaining abdominal pelvic viscera unremarkable.    < end of copied text >    ASSESSMENT :     Acute dacryocystitis of right lacrimal sac  Pressure ulcer  Pain, low back  Ulcerative colitis  Osteoarthritis  Hypothyroidism  GERD (gastroesophageal reflux disease)  Hypertension  History of ankle surgery  H/O: hysterectomy    PLAN:  HPI:  79F with HTN, folate deficiency anemia, GERD, LBP, pressure ulcers, OA, ulcerative colitis, hypothyroidism, sent to ER from [facility] of difficulty breathing and lethargy. Patient on my assessment is AAOx3, reports having discharge form her eyes since couple days with no headache or visual changes. She also endorses difficulty breathing since couple days with difficulty laying flat, requiring 2 pillows at night. Denies chest pain, palpitations, cough or sick contacts. Endorses chronic swelling of both lower extremities. Patient also reports constipation since 10 days with decreased appetite but no nausea or vomiting. (16 Jun 2020 19:28)    # RIGHT LOWER LOBE INFILTRATE S/T COMMUNITY ACQUIRED PNEUMONIA - ON ROCEPHIN AND AZITHROMYCIN, F/U BCX  # DACROCYSTITIS, RIGHT EYE - STARTED ON CIPROFLOXACIN EYE DROPS  # SAUL SUSPECT S/T PRERENAL AZOTEMIA - IMPROVING ON IVF  # HYPOKALEMIA - REPLETING WITH SUPPLEMENT  # CONSTIPATION - PLACED ON MIRALAX  # ULCERATIVE COLITIS  # PATIENT IS HIGH RISK FOR DEVELOPING PRESSURE ULCERS AND WORSENING OF PRESSURE ULCERS DESPITE PREVENTIVE MEASURES IN PLACE  # F/U PT EVALUATION AND RECOMMENDATION  # D/C PLAN TO [facility] ONCE MEDICALLY STABLE AND COVID19 PCR NEGATIVE X 2  # GI AND DVT PPX    NADINE MONTOYA M.D. Patient is a 79y old  Female who presents with a chief complaint of discharge from eyes, difficulty breathing (17 Jun 2020 18:42)    PATIENT IS SEEN AND EXAMINED IN MEDICAL FLOOR.    ALLERGIES:  iodine containing compounds (Unknown)  Milk (Unknown)  morphine (Unknown)  Orange Juice (Unknown)  Rice (Unknown)    VITALS:    Vital Signs Last 24 Hrs  T(C): 36.7 (18 Jun 2020 04:45), Max: 37.4 (17 Jun 2020 12:24)  T(F): 98 (18 Jun 2020 04:45), Max: 99.3 (17 Jun 2020 12:24)  HR: 87 (18 Jun 2020 04:45) (80 - 88)  BP: 152/73 (18 Jun 2020 04:45) (100/70 - 152/73)  BP(mean): --  RR: 16 (18 Jun 2020 04:45) (16 - 19)  SpO2: 98% (18 Jun 2020 04:45) (94% - 100%)    LABS:    CBC Full  -  ( 18 Jun 2020 07:10 )  WBC Count : 5.05 K/uL  RBC Count : 3.91 M/uL  Hemoglobin : 11.3 g/dL  Hematocrit : 39.1 %  Platelet Count - Automated : 192 K/uL  Mean Cell Volume : 100.0 fl  Mean Cell Hemoglobin : 28.9 pg  Mean Cell Hemoglobin Concentration : 28.9 gm/dL  Auto Neutrophil # : x  Auto Lymphocyte # : x  Auto Monocyte # : x  Auto Eosinophil # : x  Auto Basophil # : x  Auto Neutrophil % : x  Auto Lymphocyte % : x  Auto Monocyte % : x  Auto Eosinophil % : x  Auto Basophil % : x    PT/INR - ( 16 Jun 2020 15:10 )   PT: 14.6 sec;   INR: 1.28 ratio         PTT - ( 16 Jun 2020 15:10 )  PTT:34.7 sec  06-18    144  |  100  |  21<H>  ----------------------------<  93  3.2<L>   |  42<H>  |  1.25    Ca    8.1<L>      18 Jun 2020 07:10    TPro  6.9  /  Alb  3.1<L>  /  TBili  0.8  /  DBili  x   /  AST  24  /  ALT  14  /  AlkPhos  59  06-16    CAPILLARY BLOOD GLUCOSE      POCT Blood Glucose.: 90 mg/dL (18 Jun 2020 08:14)    CARDIAC MARKERS ( 16 Jun 2020 15:10 )  <0.015 ng/mL / x     / x     / x     / x          LIVER FUNCTIONS - ( 16 Jun 2020 15:10 )  Alb: 3.1 g/dL / Pro: 6.9 g/dL / ALK PHOS: 59 U/L / ALT: 14 U/L DA / AST: 24 U/L / GGT: x           Creatinine Trend: 1.25<--, 1.48<--, 1.73<--  I&O's Summary          .Urine Clean Catch (Midstream)  06-17 @ 01:00   No growth  --  --    MEDICATIONS:    MEDICATIONS  (STANDING):  artificial  tears Solution 1 Drop(s) Both EYES two times a day  aspirin 325 milliGRAM(s) Oral daily  azithromycin  IVPB 500 milliGRAM(s) IV Intermittent every 24 hours  cefTRIAXone   IVPB 1000 milliGRAM(s) IV Intermittent every 24 hours  cholecalciferol 1000 Unit(s) Oral daily  ciprofloxacin  0.3% Ophthalmic Solution 1 Drop(s) Both EYES two times a day  dextrose 5% + sodium chloride 0.45%. 1000 milliLiter(s) (60 mL/Hr) IV Continuous <Continuous>  folic acid 1 milliGRAM(s) Oral daily  heparin   Injectable 5000 Unit(s) SubCutaneous every 8 hours  hydrocortisone 2.5% Rectal Cream 1 Application(s) Rectal at bedtime  levothyroxine 100 MICROGram(s) Oral daily  multivitamin/minerals 1 Tablet(s) Oral daily  pantoprazole    Tablet 40 milliGRAM(s) Oral before breakfast  polyethylene glycol 3350 17 Gram(s) Oral daily  potassium chloride    Tablet ER 40 milliEquivalent(s) Oral every 4 hours      MEDICATIONS  (PRN):  acetaminophen   Tablet .. 650 milliGRAM(s) Oral every 4 hours PRN Mild Pain (1 - 3)  ondansetron Injectable 4 milliGRAM(s) IV Push once PRN Nausea and/or Vomiting      REVIEW OF SYSTEMS:                           ALL ROS DONE [ X   ]    CONSTITUTIONAL:  LETHARGIC [   ], FEVER [   ], UNRESPONSIVE [   ]  CVS:  CP  [   ], SOB, [   ], PALPITATIONS [   ], DIZZYNESS [   ]  RS: COUGH [   ], SPUTUM [   ]  GI: ABDOMINAL PAIN [   ], NAUSEA [   ], VOMITINGS [   ], DIARRHEA [   ], CONSTIPATION [   ]  :  DYSURIA [   ], NOCTURIA [   ], INCREASED FREQUENCY [   ], DRIBLING [   ],  SKELETAL: PAINFUL JOINTS [   ], SWOLLEN JOINTS [   ], NECK ACHE [   ], LOW BACK ACHE [   ],  SKIN : ULCERS [   ], RASH [   ], ITCHING [   ]  CNS: HEAD ACHE [   ], DOUBLE VISION [   ], BLURRED VISION [   ], AMS / CONFUSION [   ], SEIZURES [   ], WEAKNESS [   ],TINGLING / NUMBNESS [   ]      PHYSICAL EXAMINATION:  GENERAL APPEARANCE: NO DISTRESS  HEENT:  NO PALLOR, NO  JVD,  NO   NODES, NECK SUPPLE, RIGHT EYE WITH ERYTHEMA AND DRAINAGE  CVS: S1 +, S2 +,   RS: AEEB,  OCCASIONAL  RALES +,   NO RONCHI, RLL CRACKLES  ABD: SOFT, NT, NO, BS +  EXT: NO PE  SKIN: WARM,   SKELETAL:  ROM ACCEPTABLE  CNS:  AAO X  1-2,   DEFICITS    RADIOLOGY :    < from: CT Chest No Cont (06.16.20 @ 18:40) >  IMPRESSION:     RIGHT lower lobe of posterior basilar segmental airspace consolidation. Increased interstitial markings in the periphery which may indicate chronic interstitial fibrosis. No pleural effusion.  Gross cardiomegaly. Heavily calcified mitral annulus.    No bowel obstruction or fecal impaction.  Cholelithiasis without secondary signs of acute cholecystitis.  No fracture  Remaining abdominal pelvic viscera unremarkable.    < end of copied text >    ASSESSMENT :     Acute dacryocystitis of right lacrimal sac  Pressure ulcer  Pain, low back  Ulcerative colitis  Osteoarthritis  Hypothyroidism  GERD (gastroesophageal reflux disease)  Hypertension  History of ankle surgery  H/O: hysterectomy    PLAN:  HPI:  79F with HTN, folate deficiency anemia, GERD, LBP, pressure ulcers, OA, ulcerative colitis, hypothyroidism, sent to ER from [facility] of difficulty breathing and lethargy. Patient on my assessment is AAOx3, reports having discharge form her eyes since couple days with no headache or visual changes. She also endorses difficulty breathing since couple days with difficulty laying flat, requiring 2 pillows at night. Denies chest pain, palpitations, cough or sick contacts. Endorses chronic swelling of both lower extremities. Patient also reports constipation since 10 days with decreased appetite but no nausea or vomiting. (16 Jun 2020 19:28)    # RIGHT LOWER LOBE INFILTRATE S/T COMMUNITY ACQUIRED PNEUMONIA - ON ROCEPHIN AND AZITHROMYCIN, F/U BCX  # DACROCYSTITIS, RIGHT EYE - STARTED ON CIPROFLOXACIN EYE DROPS  # SAUL SUSPECT S/T PRERENAL AZOTEMIA - IMPROVING ON IVF  # HYPOKALEMIA - REPLETING WITH SUPPLEMENT  # CONSTIPATION - PLACED ON MIRALAX  # HYPOTHYROIDISM - INCREASED LEVOTHYROXINE FROM 75 MCG TO 100MCG  # ULCERATIVE COLITIS  # PATIENT IS HIGH RISK FOR DEVELOPING PRESSURE ULCERS AND WORSENING OF PRESSURE ULCERS DESPITE PREVENTIVE MEASURES IN PLACE  # F/U PT EVALUATION AND RECOMMENDATION  # D/C PLAN TO [facility] ONCE MEDICALLY STABLE AND COVID19 PCR NEGATIVE X 2  # GI AND DVT PPX    NADINE MONTOYA M.D.

## 2020-06-19 ENCOUNTER — TRANSCRIPTION ENCOUNTER (OUTPATIENT)
Age: 80
End: 2020-06-19

## 2020-06-19 LAB
ANION GAP SERPL CALC-SCNC: 0 MMOL/L — LOW (ref 5–17)
BASOPHILS # BLD AUTO: 0.02 K/UL — SIGNIFICANT CHANGE UP (ref 0–0.2)
BASOPHILS NFR BLD AUTO: 0.3 % — SIGNIFICANT CHANGE UP (ref 0–2)
BUN SERPL-MCNC: 19 MG/DL — HIGH (ref 7–18)
CALCIUM SERPL-MCNC: 7.5 MG/DL — LOW (ref 8.4–10.5)
CHLORIDE SERPL-SCNC: 99 MMOL/L — SIGNIFICANT CHANGE UP (ref 96–108)
CO2 SERPL-SCNC: 45 MMOL/L — CRITICAL HIGH (ref 22–31)
CREAT SERPL-MCNC: 1.09 MG/DL — SIGNIFICANT CHANGE UP (ref 0.5–1.3)
EOSINOPHIL # BLD AUTO: 0.44 K/UL — SIGNIFICANT CHANGE UP (ref 0–0.5)
EOSINOPHIL NFR BLD AUTO: 7.7 % — HIGH (ref 0–6)
GLUCOSE SERPL-MCNC: 100 MG/DL — HIGH (ref 70–99)
HCT VFR BLD CALC: 37.7 % — SIGNIFICANT CHANGE UP (ref 34.5–45)
HGB BLD-MCNC: 10.9 G/DL — LOW (ref 11.5–15.5)
IMM GRANULOCYTES NFR BLD AUTO: 0.7 % — SIGNIFICANT CHANGE UP (ref 0–1.5)
LEGIONELLA AG UR QL: NEGATIVE — SIGNIFICANT CHANGE UP
LYMPHOCYTES # BLD AUTO: 2.32 K/UL — SIGNIFICANT CHANGE UP (ref 1–3.3)
LYMPHOCYTES # BLD AUTO: 40.4 % — SIGNIFICANT CHANGE UP (ref 13–44)
MCHC RBC-ENTMCNC: 28.9 GM/DL — LOW (ref 32–36)
MCHC RBC-ENTMCNC: 28.9 PG — SIGNIFICANT CHANGE UP (ref 27–34)
MCV RBC AUTO: 100 FL — SIGNIFICANT CHANGE UP (ref 80–100)
MONOCYTES # BLD AUTO: 0.76 K/UL — SIGNIFICANT CHANGE UP (ref 0–0.9)
MONOCYTES NFR BLD AUTO: 13.2 % — SIGNIFICANT CHANGE UP (ref 2–14)
NEUTROPHILS # BLD AUTO: 2.16 K/UL — SIGNIFICANT CHANGE UP (ref 1.8–7.4)
NEUTROPHILS NFR BLD AUTO: 37.7 % — LOW (ref 43–77)
NRBC # BLD: 0 /100 WBCS — SIGNIFICANT CHANGE UP (ref 0–0)
PLATELET # BLD AUTO: 209 K/UL — SIGNIFICANT CHANGE UP (ref 150–400)
POTASSIUM SERPL-MCNC: 2.9 MMOL/L — CRITICAL LOW (ref 3.5–5.3)
POTASSIUM SERPL-SCNC: 2.9 MMOL/L — CRITICAL LOW (ref 3.5–5.3)
RBC # BLD: 3.77 M/UL — LOW (ref 3.8–5.2)
RBC # FLD: 14.3 % — SIGNIFICANT CHANGE UP (ref 10.3–14.5)
SARS-COV-2 IGG SERPL QL IA: POSITIVE
SARS-COV-2 IGM SERPL IA-ACNC: 101 INDEX — HIGH
SODIUM SERPL-SCNC: 144 MMOL/L — SIGNIFICANT CHANGE UP (ref 135–145)
WBC # BLD: 5.74 K/UL — SIGNIFICANT CHANGE UP (ref 3.8–10.5)
WBC # FLD AUTO: 5.74 K/UL — SIGNIFICANT CHANGE UP (ref 3.8–10.5)

## 2020-06-19 RX ORDER — POTASSIUM CHLORIDE 20 MEQ
10 PACKET (EA) ORAL
Refills: 0 | Status: COMPLETED | OUTPATIENT
Start: 2020-06-19 | End: 2020-06-19

## 2020-06-19 RX ORDER — POTASSIUM CHLORIDE 20 MEQ
20 PACKET (EA) ORAL ONCE
Refills: 0 | Status: COMPLETED | OUTPATIENT
Start: 2020-06-19 | End: 2020-06-19

## 2020-06-19 RX ADMIN — AZITHROMYCIN 255 MILLIGRAM(S): 500 TABLET, FILM COATED ORAL at 00:31

## 2020-06-19 RX ADMIN — Medication 1000 UNIT(S): at 12:15

## 2020-06-19 RX ADMIN — Medication 1 DROP(S): at 17:07

## 2020-06-19 RX ADMIN — Medication 20 MILLIEQUIVALENT(S): at 12:15

## 2020-06-19 RX ADMIN — Medication 100 MILLIEQUIVALENT(S): at 09:14

## 2020-06-19 RX ADMIN — AZITHROMYCIN 255 MILLIGRAM(S): 500 TABLET, FILM COATED ORAL at 23:27

## 2020-06-19 RX ADMIN — PANTOPRAZOLE SODIUM 40 MILLIGRAM(S): 20 TABLET, DELAYED RELEASE ORAL at 06:02

## 2020-06-19 RX ADMIN — Medication 100 MILLIEQUIVALENT(S): at 11:01

## 2020-06-19 RX ADMIN — Medication 1 DROP(S): at 05:12

## 2020-06-19 RX ADMIN — Medication 325 MILLIGRAM(S): at 12:14

## 2020-06-19 RX ADMIN — Medication 1 MILLIGRAM(S): at 12:15

## 2020-06-19 RX ADMIN — Medication 100 MILLIEQUIVALENT(S): at 08:30

## 2020-06-19 RX ADMIN — Medication 1 TABLET(S): at 12:15

## 2020-06-19 RX ADMIN — Medication 100 MICROGRAM(S): at 05:12

## 2020-06-19 RX ADMIN — Medication 1 DROP(S): at 05:11

## 2020-06-19 RX ADMIN — HEPARIN SODIUM 5000 UNIT(S): 5000 INJECTION INTRAVENOUS; SUBCUTANEOUS at 14:55

## 2020-06-19 RX ADMIN — HEPARIN SODIUM 5000 UNIT(S): 5000 INJECTION INTRAVENOUS; SUBCUTANEOUS at 05:12

## 2020-06-19 RX ADMIN — CEFTRIAXONE 100 MILLIGRAM(S): 500 INJECTION, POWDER, FOR SOLUTION INTRAMUSCULAR; INTRAVENOUS at 21:52

## 2020-06-19 NOTE — DISCHARGE NOTE PROVIDER - NSDCMRMEDTOKEN_GEN_ALL_CORE_FT
amLODIPine 5 mg oral tablet: 1 tab(s) orally once a day  aspirin 325 mg oral tablet: 1 tab(s) orally once a day  folic acid 1 mg oral tablet: 1 tab(s) orally once a day  Icy Hot Lidocaine Plus Menthol 4%-1% topical film: Apply topically to affected area once a day  Lasix 40 mg oral tablet: 1 tab(s) orally once a day  levothyroxine 75 mcg (0.075 mg) oral tablet: 1 tab(s) orally once a day  Multiple Vitamins with Minerals oral tablet: 1 tab(s) orally once a day  Pred Forte 1% ophthalmic suspension: 1 drop(s) to each affected eye 2 times a day  Proctocream-HC 2.5% rectal cream with applicator: 1 application rectal once a day (at bedtime)  Protonix 40 mg oral delayed release tablet: 1 tab(s) orally once a day  Tylenol 325 mg oral tablet: 2 tab(s) orally every 4 hours  Vitamin D3 1000 intl units (25 mcg) oral tablet: 1 tab(s) orally once a day amLODIPine 5 mg oral tablet: 1 tab(s) orally once a day  aspirin 325 mg oral tablet: 1 tab(s) orally once a day  folic acid 1 mg oral tablet: 1 tab(s) orally once a day  Icy Hot Lidocaine Plus Menthol 4%-1% topical film: Apply topically to affected area once a day  Lasix 40 mg oral tablet: 1 tab(s) orally once a day  levothyroxine 100 mcg (0.1 mg) oral tablet: 1 tab(s) orally once a day  Multiple Vitamins with Minerals oral tablet: 1 tab(s) orally once a day  ocular lubricant ophthalmic solution: 1 drop(s) to each affected eye 2 times a day  Pred Forte 1% ophthalmic suspension: 1 drop(s) to each affected eye 2 times a day  Proctocream-HC 2.5% rectal cream with applicator: 1 application rectal once a day (at bedtime)  Protonix 40 mg oral delayed release tablet: 1 tab(s) orally once a day  Tylenol 325 mg oral tablet: 2 tab(s) orally every 4 hours  Vitamin D3 1000 intl units (25 mcg) oral tablet: 1 tab(s) orally once a day aspirin 325 mg oral tablet: 1 tab(s) orally once a day  ciprofloxacin 0.3% ophthalmic solution: 1 drop(s) to each affected eye 2 times a day  folic acid 1 mg oral tablet: 1 tab(s) orally once a day  Icy Hot Lidocaine Plus Menthol 4%-1% topical film: Apply topically to affected area once a day  Lasix 40 mg oral tablet: 1 tab(s) orally once a day  levothyroxine 100 mcg (0.1 mg) oral tablet: 1 tab(s) orally once a day  Multiple Vitamins with Minerals oral tablet: 1 tab(s) orally once a day  Norvasc 2.5 mg oral tablet: 1 tab(s) orally once a day  ocular lubricant ophthalmic solution: 1 drop(s) to each affected eye 2 times a day  polyethylene glycol 3350 oral powder for reconstitution: 17 gram(s) orally once a day, As needed, Constipation  Pred Forte 1% ophthalmic suspension: 1 drop(s) to each affected eye 2 times a day  Proctocream-HC 2.5% rectal cream with applicator: 1 application rectal once a day (at bedtime)  Protonix 40 mg oral delayed release tablet: 1 tab(s) orally once a day  senna oral tablet: 2 tab(s) orally once a day (at bedtime) as needed for constipation   Tylenol 325 mg oral tablet: 2 tab(s) orally every 4 hours  Vitamin D3 1000 intl units (25 mcg) oral tablet: 1 tab(s) orally once a day

## 2020-06-19 NOTE — PHYSICAL THERAPY INITIAL EVALUATION ADULT - GENERAL OBSERVATIONS, REHAB EVAL
pt emr review, covid+, nc, rehab transfers, le ace wrap, weak trunk and leg control limiting functional mobility

## 2020-06-19 NOTE — DISCHARGE NOTE PROVIDER - NSDCCPCAREPLAN_GEN_ALL_CORE_FT
PRINCIPAL DISCHARGE DIAGNOSIS  Diagnosis: RLL pneumonia  Assessment and Plan of Treatment: Make sure to continue taking your medication as prescribed and report any worseing shortness of breath to your doctor, fever or temperature of 101.0F or higher, chest pain.      SECONDARY DISCHARGE DIAGNOSES  Diagnosis: COVID-19 virus detected  Assessment and Plan of Treatment: .For up to date information information on COVID -19, you may contact the [facility] of Health Hotline at [phone]. Tylenol 650mg tablet twice a day for fever and you can also apply cool compress for fever. You can take Aleve 2 pills twice a day with food for bodyaches .  Perform frequent good hand hygiene by washing your hands with soap and hot water as can tolerate but not to burn your hands.  Disinfect your house doorknobs, steering wheels, refrigerator door handles.  If your symptoms worsen, fever, chills, chest pain or sob, call your doctor.  Call 911 for severe shortness of breath or get to your nearest Emergency Department and inform the staff or 911 that you were tested positive for COVID-19.  Continue to self quarantine and wear a mask so as to not infect others.  Drink lots of fluids.      Diagnosis: Dacrocystitis, right  Assessment and Plan of Treatment: Continue using your eye drop as prescribed and do not let bottle touch your eye when applying the eye drop.  Report any visual changes, red painful eye to your doctor.    Diagnosis: Renal insufficiency  Assessment and Plan of Treatment:     Diagnosis: Failure to thrive in adult  Assessment and Plan of Treatment: PRINCIPAL DISCHARGE DIAGNOSIS  Diagnosis: RLL pneumonia  Assessment and Plan of Treatment: Make sure to continue taking your medication as prescribed and report any worseing shortness of breath to your doctor, fever or temperature of 101.0F or higher, chest pain.      SECONDARY DISCHARGE DIAGNOSES  Diagnosis: COVID-19 virus detected  Assessment and Plan of Treatment: .For up to date information information on COVID -19, you may contact the [facility] of Health Hotline at [phone]. Tylenol 650mg tablet twice a day for fever and you can also apply cool compress for fever. You can take Aleve 2 pills twice a day with food for bodyaches .  Perform frequent good hand hygiene by washing your hands with soap and hot water as can tolerate but not to burn your hands.  Disinfect your house doorknobs, steering wheels, refrigerator door handles.  If your symptoms worsen, fever, chills, chest pain or sob, call your doctor.  Call 911 for severe shortness of breath or get to your nearest Emergency Department and inform the staff or 911 that you were tested positive for COVID-19.  Continue to self quarantine and wear a mask so as to not infect others.  Drink lots of fluids.      Diagnosis: Dacrocystitis, right  Assessment and Plan of Treatment: Continue using your eye drop as prescribed and do not let bottle touch your eye when applying the eye drop.  Report any visual changes, red painful eye to your doctor.    Diagnosis: Renal insufficiency  Assessment and Plan of Treatment:     Diagnosis: Failure to thrive in adult  Assessment and Plan of Treatment: PRINCIPAL DISCHARGE DIAGNOSIS  Diagnosis: RLL pneumonia  Assessment and Plan of Treatment: Make sure to continue taking your medication as prescribed and report any worseing shortness of breath to your doctor, fever or temperature of 101.0F or higher, chest pain.        SECONDARY DISCHARGE DIAGNOSES  Diagnosis: COVID-19 virus detected  Assessment and Plan of Treatment: .For up to date information information on COVID -19, you may contact the Department of Health Hotline at [phone]. Tylenol 650mg tablet twice a day for fever and you can also apply cool compress for fever. You can take Aleve 2 pills twice a day with food for bodyaches .  Perform frequent good hand hygiene by washing your hands with soap and hot water as can tolerate but not to burn your hands.  Disinfect your house doorknobs, steering wheels, refrigerator door handles.  If your symptoms worsen, fever, chills, chest pain or sob, call your doctor.  Call 911 for severe shortness of breath or get to your nearest Emergency Department and inform the staff or 911 that you were tested positive for COVID-19.  Continue to self quarantine and wear a mask so as to not infect others.  Drink lots of fluids.      Diagnosis: Dacrocystitis, right  Assessment and Plan of Treatment: Continue using your eye drop as prescribed and do not let bottle touch your eye when applying the eye drop.  Report any visual changes, red painful eye to your doctor.    Diagnosis: Constipation  Assessment and Plan of Treatment: Continue with medications as prescribed ( hold medication for loose bowel)   Follow-up with your primary care physician.  Call your physician if you develop nausea/vomiting, abdominal pain not relieved with pain regimen, constipation not relieved with bowel  regimen.      Diagnosis: Hypothyroidism  Assessment and Plan of Treatment: you do not make enough thyroid hormone  signs & symptoms of low levels of thyroid hormone - tired, getting cold easily, coarse or thin hair, constipation, shortness of breath, swelling, irregular periods  your doctor will do thyroid hormone blood tests at least once a year to monitor if medication dose is adequate  take your thyroid medicine as directed by your doctor & on empty stomach

## 2020-06-19 NOTE — PROGRESS NOTE ADULT - SUBJECTIVE AND OBJECTIVE BOX
NP Note:    Patient is a 79y old  Female who presents with a chief complaint of discharge from eyes, difficulty breathing (19 Jun 2020 09:00AM)    INTERVAL HPI/OVERNIGHT EVENTS: no new complaints, reported feeling better in context of respiration.    Pt is a 79F with HTN, folate deficiency anemia, GERD, LBP, pressure ulcers, OA, ulcerative colitis, hypothyroidism, sent to ER from [facility] of difficulty breathing and lethargy. Pt reports having discharge form her eyes since couple days with no headache or visual changes. She also endorses difficulty breathing since couple days with difficulty laying flat, requiring 2 pillows at night. Denies chest pain, palpitations, cough or sick contacts. Endorses chronic swelling of both lower extremities. Patient also reports constipation since 10 days with decreased appetite but no nausea or vomiting. (16 Jun 2020 19:28)      MEDICATIONS  (STANDING):  artificial  tears Solution 1 Drop(s) Both EYES two times a day  aspirin 325 milliGRAM(s) Oral daily  azithromycin  IVPB 500 milliGRAM(s) IV Intermittent every 24 hours  cefTRIAXone   IVPB 1000 milliGRAM(s) IV Intermittent every 24 hours  cholecalciferol 1000 Unit(s) Oral daily  ciprofloxacin  0.3% Ophthalmic Solution 1 Drop(s) Both EYES two times a day  dextrose 5% + sodium chloride 0.45%. 1000 milliLiter(s) (60 mL/Hr) IV Continuous <Continuous>  folic acid 1 milliGRAM(s) Oral daily  heparin   Injectable 5000 Unit(s) SubCutaneous every 8 hours  hydrocortisone 2.5% Rectal Cream 1 Application(s) Rectal at bedtime  levothyroxine 100 MICROGram(s) Oral daily  multivitamin/minerals 1 Tablet(s) Oral daily  pantoprazole    Tablet 40 milliGRAM(s) Oral before breakfast  polyethylene glycol 3350 17 Gram(s) Oral daily    MEDICATIONS  (PRN):  acetaminophen   Tablet .. 650 milliGRAM(s) Oral every 4 hours PRN Mild Pain (1 - 3)  ondansetron Injectable 4 milliGRAM(s) IV Push once PRN Nausea and/or Vomiting      __________________________________________________  REVIEW OF SYSTEMS:    CONSTITUTIONAL: No fever,   EYES: no acute visual disturbances  NECK: No pain or stiffness  RESPIRATORY: No cough; No shortness of breath  CARDIOVASCULAR: No chest pain, no palpitations  GASTROINTESTINAL: No pain. No nausea or vomiting; No diarrhea   NEUROLOGICAL: No headache or numbness, no tremors  MUSCULOSKELETAL: No joint pain, no muscle pain  GENITOURINARY: no dysuria, no frequency, no hesitancy  PSYCHIATRY: no depression , no anxiety  ALL OTHER  ROS negative        Vital Signs Last 24 Hrs  T(C): 36.3 (19 Jun 2020 04:50), Max: 36.6 (18 Jun 2020 20:30)  T(F): 97.4 (19 Jun 2020 04:50), Max: 97.9 (18 Jun 2020 20:30)  HR: 83 (19 Jun 2020 11:28) (80 - 87)  BP: 153/69 (19 Jun 2020 04:50) (135/62 - 153/69)  BP(mean): --  RR: 17 (19 Jun 2020 04:50) (17 - 17)  SpO2: 92% (19 Jun 2020 11:28) (92% - 98%)    ________________________________________________  PHYSICAL EXAM:  GENERAL: NAD  HEENT: Normocephalic;  conjunctivae and sclerae clear; moist mucous membranes;   NECK : supple  CHEST/LUNG: Clear to auscultation bilaterally with good air entry   HEART: S1 S2  regular; no murmurs, gallops or rubs  ABDOMEN: Soft, Nontender, Nondistended; Bowel sounds present  EXTREMITIES: no cyanosis; no edema; no calf tenderness  SKIN: warm and dry; no rash  NERVOUS SYSTEM:  Awake and alert; Oriented  to place, person and time ; no new deficits    _________________________________________________  LABS:                        10.9   5.74  )-----------( 209      ( 19 Jun 2020 06:28 )             37.7     06-19    144  |  99  |  19<H>  ----------------------------<  100<H>  2.9<LL>   |  45<HH>  |  1.09    Ca    7.5<L>      19 Jun 2020 06:28          CAPILLARY BLOOD GLUCOSE      RADIOLOGY & ADDITIONAL TESTS:    Imaging  Reviewed:  YES/NO    Consultant(s) Notes Reviewed:   YES/ No      Plan of care was discussed with patient and /or primary care giver; all questions and concerns were addressed:    Assessment and Plan  Assessment:  79F with HTN, folate deficiency anemia, GERD, LBP, pressure ulcers, OA, ulcerative colitis, hypothyroidism, sent to ER from [facility] of difficulty breathing and lethargy.  Patient on my assessment is AAOx3, reports having discharge form her eyes since couple days with no headache or visual changes. She also endorses difficulty breathing since couple days with difficulty laying flat, requiring 2 pillows at night. Denies chest pain, palpitations, cough or sick contacts. Endorses chronic swelling of both lower extremities. Patient also reports constipation since 10 days with decreased appetite but no nausea or vomiting. (16 Jun 2020 19:28)    # RIGHT LOWER LOBE INFILTRATE ON CT SCAN- ON ROCEPHIN AND AZITHROMYCIN, BLD CX- NGTD  # DACROCYSTITIS, RIGHT EYE - STARTED ON CIPROFLOXACIN EYE DROPS  # SAUL SUSPECT S/T PRERENAL AZOTEMIA - IMPROVING ON IVF  # HYPOKALEMIA - 2.7 TODAY, WILL REPLETE, CO2 45 LIKELY CHRONIC RETAINER AND CONSISTENT W/ CO2 LEVEL ON ADMISSION, ASYMPTOMATIC   # CONSTIPATION - RESOLVED, D/C MIRALAX  # HYPOTHYROIDISM - INCREASED LEVOTHYROXINE FROM 75 MCG TO 100MCG  # ULCERATIVE COLITIS- MONITOR FOR SYMPTOMS  # PATIENT IS HIGH RISK FOR DEVELOPING PRESSURE ULCERS AND WORSENING OF PRESSURE ULCERS DESPITE PREVENTIVE MEASURES IN PLACE  # F/U PT EVALUATION AND RECOMMENDATION  # D/C PLAN TO [facility] ONCE MEDICALLY STABLE AND COVID19 PCR NEGATIVE X 2-  POSITIVE COVID TEST 6/18 2020, ISOLATION PER HOSPITAL PROTOCOL   # GI AND DVT PPX NP Note:    Patient is a 79y old  Female who presents with a chief complaint of discharge from eyes, difficulty breathing (19 Jun 2020 09:00AM)    INTERVAL HPI/OVERNIGHT EVENTS: no new complaints, reported feeling better in context of respiration.    Pt is a 79F with HTN, folate deficiency anemia, GERD, LBP, pressure ulcers, OA, ulcerative colitis, hypothyroidism, sent to ER from [facility] of difficulty breathing and lethargy. Pt reports having discharge form her eyes since couple days with no headache or visual changes. She also endorses difficulty breathing since couple days with difficulty laying flat, requiring 2 pillows at night. Denies chest pain, palpitations, cough or sick contacts. Endorses chronic swelling of both lower extremities. Patient also reports constipation since 10 days with decreased appetite but no nausea or vomiting. (16 Jun 2020 19:28)      MEDICATIONS  (STANDING):  artificial  tears Solution 1 Drop(s) Both EYES two times a day  aspirin 325 milliGRAM(s) Oral daily  azithromycin  IVPB 500 milliGRAM(s) IV Intermittent every 24 hours  cefTRIAXone   IVPB 1000 milliGRAM(s) IV Intermittent every 24 hours  cholecalciferol 1000 Unit(s) Oral daily  ciprofloxacin  0.3% Ophthalmic Solution 1 Drop(s) Both EYES two times a day  dextrose 5% + sodium chloride 0.45%. 1000 milliLiter(s) (60 mL/Hr) IV Continuous <Continuous>  folic acid 1 milliGRAM(s) Oral daily  heparin   Injectable 5000 Unit(s) SubCutaneous every 8 hours  hydrocortisone 2.5% Rectal Cream 1 Application(s) Rectal at bedtime  levothyroxine 100 MICROGram(s) Oral daily  multivitamin/minerals 1 Tablet(s) Oral daily  pantoprazole    Tablet 40 milliGRAM(s) Oral before breakfast  polyethylene glycol 3350 17 Gram(s) Oral daily    MEDICATIONS  (PRN):  acetaminophen   Tablet .. 650 milliGRAM(s) Oral every 4 hours PRN Mild Pain (1 - 3)  ondansetron Injectable 4 milliGRAM(s) IV Push once PRN Nausea and/or Vomiting      __________________________________________________  REVIEW OF SYSTEMS:    CONSTITUTIONAL: No fever,   EYES: no acute visual disturbances  NECK: No pain or stiffness  RESPIRATORY: No cough; No shortness of breath  CARDIOVASCULAR: No chest pain, no palpitations  GASTROINTESTINAL: No pain. No nausea or vomiting; No diarrhea   NEUROLOGICAL: No headache or numbness, no tremors  MUSCULOSKELETAL: No joint pain, no muscle pain  GENITOURINARY: no dysuria, no frequency, no hesitancy  PSYCHIATRY: no depression , no anxiety  ALL OTHER  ROS negative        Vital Signs Last 24 Hrs  T(C): 36.3 (19 Jun 2020 04:50), Max: 36.6 (18 Jun 2020 20:30)  T(F): 97.4 (19 Jun 2020 04:50), Max: 97.9 (18 Jun 2020 20:30)  HR: 83 (19 Jun 2020 11:28) (80 - 87)  BP: 153/69 (19 Jun 2020 04:50) (135/62 - 153/69)  BP(mean): --  RR: 17 (19 Jun 2020 04:50) (17 - 17)  SpO2: 92% (19 Jun 2020 11:28) (92% - 98%)    ________________________________________________  PHYSICAL EXAM:  GENERAL: NAD  HEENT: Normocephalic;  conjunctivae and sclerae clear; moist mucous membranes;   NECK : supple  CHEST/LUNG: Clear to auscultation bilaterally with good air entry   HEART: S1 S2  regular; no murmurs, gallops or rubs  ABDOMEN: Soft, Nontender, Nondistended; Bowel sounds present  EXTREMITIES: no cyanosis; no edema; no calf tenderness  SKIN: warm and dry; no rash  NERVOUS SYSTEM:  Awake and alert; Oriented  to place, person and time ; no new deficits    _________________________________________________  LABS:                        10.9   5.74  )-----------( 209      ( 19 Jun 2020 06:28 )             37.7     06-19    144  |  99  |  19<H>  ----------------------------<  100<H>  2.9<LL>   |  45<HH>  |  1.09    Ca    7.5<L>      19 Jun 2020 06:28          CAPILLARY BLOOD GLUCOSE      RADIOLOGY & ADDITIONAL TESTS:    Imaging  Reviewed:  YES/NO    Consultant(s) Notes Reviewed:   YES/ No      Plan of care was discussed with patient and /or primary care giver; all questions and concerns were addressed:    Assessment and Plan  Assessment:  79F with HTN, folate deficiency anemia, GERD, LBP, pressure ulcers, OA, ulcerative colitis, hypothyroidism, sent to ER from [facility] of difficulty breathing and lethargy.  Pt reports having discharge form her eyes since couple days with no headache or visual changes. She also endorses difficulty breathing since couple days with difficulty laying flat, requiring 2 pillows at night. Denies chest pain, palpitations, cough or sick contacts. Endorses chronic swelling of both lower extremities. Patient also reports constipation since 10 days with decreased appetite but no nausea or vomiting.  Of note, pt is COVID positive 6/18/202 w/o acute respiratory symptoms or increased WOB and + RLL infiltrate on CT chest.      # RIGHT LOWER LOBE INFILTRATE ON CT SCAN- ON ROCEPHIN AND AZITHROMYCIN, BLD CX- NGTD  # DACROCYSTITIS, RIGHT EYE - STARTED ON CIPROFLOXACIN EYE DROPS  # SAUL SUSPECT S/T PRERENAL AZOTEMIA - IMPROVING ON IVF  # HYPOKALEMIA - 2.7 TODAY, WILL REPLETE, CO2 45 LIKELY CHRONIC RETAINER AND CONSISTENT W/ CO2 LEVEL ON ADMISSION, ASYMPTOMATIC   # CONSTIPATION - RESOLVED, D/C MIRALAX  # HYPOTHYROIDISM - INCREASED LEVOTHYROXINE FROM 75 MCG TO 100MCG  # ULCERATIVE COLITIS- MONITOR FOR SYMPTOMS  # PATIENT IS HIGH RISK FOR DEVELOPING PRESSURE ULCERS AND WORSENING OF PRESSURE ULCERS DESPITE PREVENTIVE MEASURES IN PLACE  # F/U PT EVALUATION AND RECOMMENDATION  # D/C PLAN TO [facility] ONCE MEDICALLY STABLE AND COVID19 PCR NEGATIVE X 2-  POSITIVE COVID TEST 6/18 2020, ISOLATION PER HOSPITAL PROTOCOL   # GI AND DVT PPX

## 2020-06-19 NOTE — DISCHARGE NOTE PROVIDER - CARE PROVIDER_API CALL
[facility]  [address] [address] [place] [address]  Phone: [phone]  Fax: [phone]  Follow Up Time:

## 2020-06-19 NOTE — PHYSICAL THERAPY INITIAL EVALUATION ADULT - IMPAIRMENTS CONTRIBUTING IMPAIRED BED MOBILITY, REHAB EVAL
Detail Level: Zone
impaired balance/impaired motor control/abnormal muscle tone/impaired postural control/decreased strength

## 2020-06-19 NOTE — DISCHARGE NOTE PROVIDER - HOSPITAL COURSE
Pt is a 80 y/o F with HTN, folate deficiency anemia, GERD, LBP, pressure ulcers, OA, ulcerative colitis, hypothyroidism, sent to ER from [facility] of difficulty breathing and lethargy.  Pt reports having discharge from her eyes since couple days with no headache or visual changes. She also endorses difficulty breathing since couple days with difficulty laying flat, requiring 2 pillows at night. Denies chest pain, palpitations, cough or sick contacts. Endorses chronic swelling of both lower extremities. Patient also reports constipation since 10 days with decreased appetite but no nausea or vomiting.         ED work up showed RLL infiltrate on CT chest and COVID positive on 6/18/2020.  Pt was treated w/ Rocephin and Azithromycin.         INCOMPLETE: Pt is a 78 y/o F with HTN, folate deficiency anemia, GERD, LBP, pressure ulcers, OA, ulcerative colitis, hypothyroidism, sent to ER from [facility] of difficulty breathing and lethargy.  Pt reports having discharge from her eyes since couple days with no headache or visual changes. She also endorses difficulty breathing since couple days with difficulty laying flat, requiring 2 pillows at night. Denies chest pain, palpitations, cough or sick contacts. Endorses chronic swelling of both lower extremities. Patient also reports constipation since 10 days with decreased appetite but no nausea or vomiting.         ED work up showed RLL infiltrate on CT chest and COVID positive on 6/18/2020.  Pt was treated w/ Rocephin and Azithromycin.     Pt. with dacrocystitis or right eye, treated with Cipro eye drops.    Pt. with constipation >1week, treated with Miralax, resolved.    Pt. with hypothyroidism on synthroid with elevated TSH, synthroid dose adjusted.    Pt. with COVID19 negative on 6/21.  She is medically stable for discharge back to [facility]. Pt is a 80 y/o F with HTN, folate deficiency anemia, GERD, LBP, pressure ulcers, OA, ulcerative colitis, hypothyroidism, sent to ER from [facility] of difficulty breathing and lethargy.  Pt reports having discharge from her eyes since couple days with no headache or visual changes. She also endorses difficulty breathing since couple days with difficulty laying flat, requiring 2 pillows at night. Denies chest pain, palpitations, cough or sick contacts. Endorses chronic swelling of both lower extremities. Patient also reports constipation since 10 days with decreased appetite but no nausea or vomiting.         ED work up showed RLL infiltrate on CT chest and COVID positive on 6/18/2020.  Pt  completed course of  Rocephin and Azithromycin.  Cipro eye drop started due to Rt eye dacrocystitis, discharge resolved. also, reported constipation, s/p stool softeners, resolved constipation.  Synthroid dose adjusted based on TSH level.     COVID19 negative on 6/21 and 6.22   Given patient's improved clinical status and current hemodynamic stability decision was made to discharge. Pt is stable for discharge per attending and is advised to f/u with PCP as out-patient. Please refer to Pt's complete medical chart with documents for a full hospital course, for this is only a brief summary.

## 2020-06-20 LAB
ANION GAP SERPL CALC-SCNC: 3 MMOL/L — LOW (ref 5–17)
BASOPHILS # BLD AUTO: 0.02 K/UL — SIGNIFICANT CHANGE UP (ref 0–0.2)
BASOPHILS NFR BLD AUTO: 0.4 % — SIGNIFICANT CHANGE UP (ref 0–2)
BUN SERPL-MCNC: 13 MG/DL — SIGNIFICANT CHANGE UP (ref 7–18)
CALCIUM SERPL-MCNC: 7.6 MG/DL — LOW (ref 8.4–10.5)
CHLORIDE SERPL-SCNC: 101 MMOL/L — SIGNIFICANT CHANGE UP (ref 96–108)
CO2 SERPL-SCNC: 40 MMOL/L — HIGH (ref 22–31)
CREAT SERPL-MCNC: 0.97 MG/DL — SIGNIFICANT CHANGE UP (ref 0.5–1.3)
EOSINOPHIL # BLD AUTO: 0.38 K/UL — SIGNIFICANT CHANGE UP (ref 0–0.5)
EOSINOPHIL NFR BLD AUTO: 7.5 % — HIGH (ref 0–6)
GLUCOSE SERPL-MCNC: 101 MG/DL — HIGH (ref 70–99)
HCT VFR BLD CALC: 39.1 % — SIGNIFICANT CHANGE UP (ref 34.5–45)
HGB BLD-MCNC: 11.4 G/DL — LOW (ref 11.5–15.5)
IMM GRANULOCYTES NFR BLD AUTO: 0.8 % — SIGNIFICANT CHANGE UP (ref 0–1.5)
LYMPHOCYTES # BLD AUTO: 2.01 K/UL — SIGNIFICANT CHANGE UP (ref 1–3.3)
LYMPHOCYTES # BLD AUTO: 39.5 % — SIGNIFICANT CHANGE UP (ref 13–44)
MCHC RBC-ENTMCNC: 29.2 GM/DL — LOW (ref 32–36)
MCHC RBC-ENTMCNC: 29.2 PG — SIGNIFICANT CHANGE UP (ref 27–34)
MCV RBC AUTO: 100.3 FL — HIGH (ref 80–100)
MONOCYTES # BLD AUTO: 0.66 K/UL — SIGNIFICANT CHANGE UP (ref 0–0.9)
MONOCYTES NFR BLD AUTO: 13 % — SIGNIFICANT CHANGE UP (ref 2–14)
MRSA PCR RESULT.: SIGNIFICANT CHANGE UP
NEUTROPHILS # BLD AUTO: 1.98 K/UL — SIGNIFICANT CHANGE UP (ref 1.8–7.4)
NEUTROPHILS NFR BLD AUTO: 38.8 % — LOW (ref 43–77)
NRBC # BLD: 0 /100 WBCS — SIGNIFICANT CHANGE UP (ref 0–0)
PLATELET # BLD AUTO: 195 K/UL — SIGNIFICANT CHANGE UP (ref 150–400)
POTASSIUM SERPL-MCNC: 3.8 MMOL/L — SIGNIFICANT CHANGE UP (ref 3.5–5.3)
POTASSIUM SERPL-SCNC: 3.8 MMOL/L — SIGNIFICANT CHANGE UP (ref 3.5–5.3)
RBC # BLD: 3.9 M/UL — SIGNIFICANT CHANGE UP (ref 3.8–5.2)
RBC # FLD: 14.3 % — SIGNIFICANT CHANGE UP (ref 10.3–14.5)
S AUREUS DNA NOSE QL NAA+PROBE: SIGNIFICANT CHANGE UP
SODIUM SERPL-SCNC: 144 MMOL/L — SIGNIFICANT CHANGE UP (ref 135–145)
WBC # BLD: 5.09 K/UL — SIGNIFICANT CHANGE UP (ref 3.8–10.5)
WBC # FLD AUTO: 5.09 K/UL — SIGNIFICANT CHANGE UP (ref 3.8–10.5)

## 2020-06-20 RX ADMIN — Medication 1 MILLIGRAM(S): at 12:20

## 2020-06-20 RX ADMIN — HEPARIN SODIUM 5000 UNIT(S): 5000 INJECTION INTRAVENOUS; SUBCUTANEOUS at 14:49

## 2020-06-20 RX ADMIN — Medication 325 MILLIGRAM(S): at 14:48

## 2020-06-20 RX ADMIN — CEFTRIAXONE 100 MILLIGRAM(S): 500 INJECTION, POWDER, FOR SOLUTION INTRAMUSCULAR; INTRAVENOUS at 21:37

## 2020-06-20 RX ADMIN — Medication 100 MICROGRAM(S): at 06:15

## 2020-06-20 RX ADMIN — Medication 1 DROP(S): at 06:14

## 2020-06-20 RX ADMIN — AZITHROMYCIN 255 MILLIGRAM(S): 500 TABLET, FILM COATED ORAL at 21:37

## 2020-06-20 RX ADMIN — Medication 1 TABLET(S): at 12:20

## 2020-06-20 RX ADMIN — HEPARIN SODIUM 5000 UNIT(S): 5000 INJECTION INTRAVENOUS; SUBCUTANEOUS at 06:15

## 2020-06-20 RX ADMIN — PANTOPRAZOLE SODIUM 40 MILLIGRAM(S): 20 TABLET, DELAYED RELEASE ORAL at 06:15

## 2020-06-20 RX ADMIN — Medication 1 DROP(S): at 06:15

## 2020-06-20 RX ADMIN — Medication 1 DROP(S): at 17:41

## 2020-06-20 RX ADMIN — Medication 1 DROP(S): at 17:40

## 2020-06-20 RX ADMIN — Medication 1000 UNIT(S): at 12:20

## 2020-06-20 NOTE — PROGRESS NOTE ADULT - SUBJECTIVE AND OBJECTIVE BOX
Patient is a 79y old  Female who presents with a chief complaint of discharge from eyes, difficulty breathing (19 Jun 2020 17:08)    PATIENT IS SEEN AND EXAMINED IN MEDICAL FLOOR.    ALLERGIES:  iodine containing compounds (Unknown)  Milk (Unknown)  morphine (Unknown)  Orange Juice (Unknown)  Rice (Unknown)      VITALS:    Vital Signs Last 24 Hrs  T(C): 36.5 (20 Jun 2020 06:13), Max: 36.7 (19 Jun 2020 14:40)  T(F): 97.7 (20 Jun 2020 06:13), Max: 98 (19 Jun 2020 14:40)  HR: 82 (20 Jun 2020 06:13) (82 - 91)  BP: 120/87 (20 Jun 2020 06:13) (120/87 - 137/68)  BP(mean): --  RR: 18 (20 Jun 2020 06:13) (18 - 20)  SpO2: 100% (19 Jun 2020 20:41) (92% - 100%)    LABS:    CBC Full  -  ( 20 Jun 2020 07:08 )  WBC Count : 5.09 K/uL  RBC Count : 3.90 M/uL  Hemoglobin : 11.4 g/dL  Hematocrit : 39.1 %  Platelet Count - Automated : 195 K/uL  Mean Cell Volume : 100.3 fl  Mean Cell Hemoglobin : 29.2 pg  Mean Cell Hemoglobin Concentration : 29.2 gm/dL  Auto Neutrophil # : 1.98 K/uL  Auto Lymphocyte # : 2.01 K/uL  Auto Monocyte # : 0.66 K/uL  Auto Eosinophil # : 0.38 K/uL  Auto Basophil # : 0.02 K/uL  Auto Neutrophil % : 38.8 %  Auto Lymphocyte % : 39.5 %  Auto Monocyte % : 13.0 %  Auto Eosinophil % : 7.5 %  Auto Basophil % : 0.4 %      06-20    144  |  101  |  13  ----------------------------<  101<H>  3.8   |  40<H>  |  0.97    Ca    7.6<L>      20 Jun 2020 07:08      Creatinine Trend: 0.97<--, 1.09<--, 1.25<--, 1.48<--, 1.73<--  I&O's Summary      .Blood Blood-Peripheral  06-17 @ 09:52   No growth to date.  --  --      .Urine Clean Catch (Midstream)  06-17 @ 01:00   No growth  --  --          MEDICATIONS:    MEDICATIONS  (STANDING):  artificial  tears Solution 1 Drop(s) Both EYES two times a day  aspirin 325 milliGRAM(s) Oral daily  azithromycin  IVPB 500 milliGRAM(s) IV Intermittent every 24 hours  cefTRIAXone   IVPB 1000 milliGRAM(s) IV Intermittent every 24 hours  cholecalciferol 1000 Unit(s) Oral daily  ciprofloxacin  0.3% Ophthalmic Solution 1 Drop(s) Both EYES two times a day  dextrose 5% + sodium chloride 0.45%. 1000 milliLiter(s) (60 mL/Hr) IV Continuous <Continuous>  folic acid 1 milliGRAM(s) Oral daily  heparin   Injectable 5000 Unit(s) SubCutaneous every 8 hours  hydrocortisone 2.5% Rectal Cream 1 Application(s) Rectal at bedtime  levothyroxine 100 MICROGram(s) Oral daily  multivitamin/minerals 1 Tablet(s) Oral daily  pantoprazole    Tablet 40 milliGRAM(s) Oral before breakfast      MEDICATIONS  (PRN):  acetaminophen   Tablet .. 650 milliGRAM(s) Oral every 4 hours PRN Mild Pain (1 - 3)  ondansetron Injectable 4 milliGRAM(s) IV Push once PRN Nausea and/or Vomiting        REVIEW OF SYSTEMS:                           ALL ROS DONE [ X   ]      CONSTITUTIONAL:  LETHARGIC [   ], FEVER [   ], UNRESPONSIVE [   ]  CVS:  CP  [   ], SOB, [   ], PALPITATIONS [   ], DIZZYNESS [   ]  RS: COUGH [   ], SPUTUM [   ]  GI: ABDOMINAL PAIN [   ], NAUSEA [   ], VOMITINGS [   ], DIARRHEA [   ], CONSTIPATION [   ]  :  DYSURIA [   ], NOCTURIA [   ], INCREASED FREQUENCY [   ], DRIBLING [   ],  SKELETAL: PAINFUL JOINTS [   ], SWOLLEN JOINTS [   ], NECK ACHE [   ], LOW BACK ACHE [   ],  SKIN : ULCERS [   ], RASH [   ], ITCHING [   ]  CNS: HEAD ACHE [   ], DOUBLE VISION [   ], BLURRED VISION [   ], AMS / CONFUSION [   ], SEIZURES [   ], WEAKNESS [   ],TINGLING / NUMBNESS [   ]      PHYSICAL EXAMINATION:    GENERAL APPEARANCE: NO DISTRESS  HEENT:  NO PALLOR, NO  JVD,  NO   NODES, NECK SUPPLE  CVS: S1 +, S2 +,   RS: AEEB,  OCCASIONAL  RALES +,   NO RONCHI  ABD: SOFT, NT, NO, BS +  EXT:  PE +  SKIN: WARM,   SKELETAL:  ROM ACCEPTABLE  CNS:  AAO X  2-3  , NO  DEFICITS      RADIOLOGY :    < from: CT Abdomen and Pelvis No Cont (06.16.20 @ 18:41) >    IMPRESSION:     RIGHT lower lobe of posterior basilar segmental airspace consolidation. Increased interstitial markings in the periphery which may indicate chronic interstitial fibrosis. No pleural effusion.  Gross cardiomegaly. Heavily calcified mitral annulus.    No bowel obstruction or fecal impaction.  Cholelithiasis without secondary signs of acute cholecystitis.  No fracture  Remaining abdominal pelvic viscera unremarkable.    < end of copied text >        ASSESSMENT :     Acute dacryocystitis of right lacrimal sac  Pressure ulcer  Pain, low back  Ulcerative colitis  Osteoarthritis  Hypothyroidism  GERD (gastroesophageal reflux disease)  Hypertension  History of ankle surgery  H/O: hysterectomy      PLAN:  HPI:  79F with HTN, folate deficiency anemia, GERD, LBP, pressure ulcers, OA, ulcerative colitis, hypothyroidism, sent to ER from [facility] of difficulty breathing and lethargy. Patient on my assessment is AAOx3, reports having discharge form her eyes since couple days with no headache or visual changes. She also endorses difficulty breathing since couple days with difficulty laying flat, requiring 2 pillows at night. Denies chest pain, palpitations, cough or sick contacts. Endorses chronic swelling of both lower extremities. Patient also reports constipation since 10 days with decreased appetite but no nausea or vomiting. (16 Jun 2020 19:28)    #  RIGHT LOWER LOBE INFILTRATE ON CT SCAN- ON ROCEPHIN AND AZITHROMYCIN, BLD CX- NGTD  #  COVID 19 INFECTION   #  DACROCYSTITIS, RIGHT EYE - STARTED ON CIPROFLOXACIN EYE DROPS  #  SAUL SUSPECT S/T PRERENAL AZOTEMIA - IMPROVING ON IVF  #  HYPOKALEMIA - RESOLVING  #  CONSTIPATION - RESOLVING   #  HYPOTHYROIDISM - ON INCREASED LEVOTHYROXINE 100MCG  #  ULCERATIVE COLITIS- STABLE - MONITOR FOR SYMPTOMS  #  PATIENT IS HIGH RISK FOR DEVELOPING PRESSURE ULCERS AND WORSENING OF PRESSURE ULCERS DESPITE PREVENTIVE MEASURES IN PLACE  #  F/U PT EVALUATION AND RECOMMENDATION  #  D/C PLAN TO [facility] ONCE MEDICALLY STABLE AND COVID19 PCR NEGATIVE X 2-  POSITIVE COVID TEST 6/18 2020, ISOLATION PER HOSPITAL PROTOCOL   #  GI AND DVT PPX  -  DR. HAILE MONTOYA Patient is a 79y old  Female who presents with a chief complaint of discharge from eyes, difficulty breathing (19 Jun 2020 17:08)    PATIENT IS SEEN AND EXAMINED IN MEDICAL FLOOR.    ALLERGIES:  iodine containing compounds (Unknown)  Milk (Unknown)  morphine (Unknown)  Orange Juice (Unknown)  Rice (Unknown)      VITALS:    Vital Signs Last 24 Hrs  T(C): 36.5 (20 Jun 2020 06:13), Max: 36.7 (19 Jun 2020 14:40)  T(F): 97.7 (20 Jun 2020 06:13), Max: 98 (19 Jun 2020 14:40)  HR: 82 (20 Jun 2020 06:13) (82 - 91)  BP: 120/87 (20 Jun 2020 06:13) (120/87 - 137/68)  BP(mean): --  RR: 18 (20 Jun 2020 06:13) (18 - 20)  SpO2: 100% (19 Jun 2020 20:41) (92% - 100%)    LABS:    CBC Full  -  ( 20 Jun 2020 07:08 )  WBC Count : 5.09 K/uL  RBC Count : 3.90 M/uL  Hemoglobin : 11.4 g/dL  Hematocrit : 39.1 %  Platelet Count - Automated : 195 K/uL  Mean Cell Volume : 100.3 fl  Mean Cell Hemoglobin : 29.2 pg  Mean Cell Hemoglobin Concentration : 29.2 gm/dL  Auto Neutrophil # : 1.98 K/uL  Auto Lymphocyte # : 2.01 K/uL  Auto Monocyte # : 0.66 K/uL  Auto Eosinophil # : 0.38 K/uL  Auto Basophil # : 0.02 K/uL  Auto Neutrophil % : 38.8 %  Auto Lymphocyte % : 39.5 %  Auto Monocyte % : 13.0 %  Auto Eosinophil % : 7.5 %  Auto Basophil % : 0.4 %      06-20    144  |  101  |  13  ----------------------------<  101<H>  3.8   |  40<H>  |  0.97    Ca    7.6<L>      20 Jun 2020 07:08      Creatinine Trend: 0.97<--, 1.09<--, 1.25<--, 1.48<--, 1.73<--  I&O's Summary      .Blood Blood-Peripheral  06-17 @ 09:52   No growth to date.  --  --      .Urine Clean Catch (Midstream)  06-17 @ 01:00   No growth  --  --          MEDICATIONS:    MEDICATIONS  (STANDING):  artificial  tears Solution 1 Drop(s) Both EYES two times a day  aspirin 325 milliGRAM(s) Oral daily  azithromycin  IVPB 500 milliGRAM(s) IV Intermittent every 24 hours  cefTRIAXone   IVPB 1000 milliGRAM(s) IV Intermittent every 24 hours  cholecalciferol 1000 Unit(s) Oral daily  ciprofloxacin  0.3% Ophthalmic Solution 1 Drop(s) Both EYES two times a day  dextrose 5% + sodium chloride 0.45%. 1000 milliLiter(s) (60 mL/Hr) IV Continuous <Continuous>  folic acid 1 milliGRAM(s) Oral daily  heparin   Injectable 5000 Unit(s) SubCutaneous every 8 hours  hydrocortisone 2.5% Rectal Cream 1 Application(s) Rectal at bedtime  levothyroxine 100 MICROGram(s) Oral daily  multivitamin/minerals 1 Tablet(s) Oral daily  pantoprazole    Tablet 40 milliGRAM(s) Oral before breakfast      MEDICATIONS  (PRN):  acetaminophen   Tablet .. 650 milliGRAM(s) Oral every 4 hours PRN Mild Pain (1 - 3)  ondansetron Injectable 4 milliGRAM(s) IV Push once PRN Nausea and/or Vomiting        REVIEW OF SYSTEMS:                           ALL ROS DONE [ X   ]      CONSTITUTIONAL:  LETHARGIC [   ], FEVER [   ], UNRESPONSIVE [   ]  CVS:  CP  [   ], SOB, [   ], PALPITATIONS [   ], DIZZYNESS [   ]  RS: COUGH [   ], SPUTUM [   ]  GI: ABDOMINAL PAIN [   ], NAUSEA [   ], VOMITINGS [   ], DIARRHEA [   ], CONSTIPATION [   ]  :  DYSURIA [   ], NOCTURIA [   ], INCREASED FREQUENCY [   ], DRIBLING [   ],  SKELETAL: PAINFUL JOINTS [   ], SWOLLEN JOINTS [   ], NECK ACHE [   ], LOW BACK ACHE [   ],  SKIN : ULCERS [   ], RASH [   ], ITCHING [   ]  CNS: HEAD ACHE [   ], DOUBLE VISION [   ], BLURRED VISION [   ], AMS / CONFUSION [   ], SEIZURES [   ], WEAKNESS [   ],TINGLING / NUMBNESS [   ]      PHYSICAL EXAMINATION:    GENERAL APPEARANCE: NO DISTRESS  HEENT:  NO PALLOR, NO  JVD,  NO   NODES, NECK SUPPLE  CVS: S1 +, S2 +,   RS: AEEB,  OCCASIONAL  RALES +,   NO RONCHI  ABD: SOFT, NT, NO, BS +  EXT:  PE +  SKIN: WARM,   SKELETAL:  ROM ACCEPTABLE  CNS:  AAO X  2-3  , NO  DEFICITS      RADIOLOGY :    < from: CT Abdomen and Pelvis No Cont (06.16.20 @ 18:41) >    IMPRESSION:     RIGHT lower lobe of posterior basilar segmental airspace consolidation. Increased interstitial markings in the periphery which may indicate chronic interstitial fibrosis. No pleural effusion.  Gross cardiomegaly. Heavily calcified mitral annulus.    No bowel obstruction or fecal impaction.  Cholelithiasis without secondary signs of acute cholecystitis.  No fracture  Remaining abdominal pelvic viscera unremarkable.    < end of copied text >        ASSESSMENT :     Acute dacryocystitis of right lacrimal sac  Pressure ulcer  Pain, low back  Ulcerative colitis  Osteoarthritis  Hypothyroidism  GERD (gastroesophageal reflux disease)  Hypertension  History of ankle surgery  H/O: hysterectomy      PLAN:  HPI:  79F with HTN, folate deficiency anemia, GERD, LBP, pressure ulcers, OA, ulcerative colitis, hypothyroidism, sent to ER from [facility] of difficulty breathing and lethargy. Patient on my assessment is AAOx3, reports having discharge form her eyes since couple days with no headache or visual changes. She also endorses difficulty breathing since couple days with difficulty laying flat, requiring 2 pillows at night. Denies chest pain, palpitations, cough or sick contacts. Endorses chronic swelling of both lower extremities. Patient also reports constipation since 10 days with decreased appetite but no nausea or vomiting. (16 Jun 2020 19:28)      #  DC PLAN BACK TO [facility] WHEN STABLE  #  RIGHT LOWER LOBE INFILTRATE ON CT SCAN- ON ROCEPHIN AND AZITHROMYCIN, BLD CX- NGTD  #  COVID 19 INFECTION   #  DACROCYSTITIS, RIGHT EYE - STARTED ON CIPROFLOXACIN EYE DROPS  #  SAUL SUSPECT S/T PRERENAL AZOTEMIA - IMPROVING ON IVF  #  HYPOKALEMIA - RESOLVING  #  CONSTIPATION - RESOLVING   #  HYPOTHYROIDISM - ON INCREASED LEVOTHYROXINE 100MCG  #  ULCERATIVE COLITIS- STABLE - MONITOR FOR SYMPTOMS  #  PATIENT IS HIGH RISK FOR DEVELOPING PRESSURE ULCERS AND WORSENING OF PRESSURE ULCERS DESPITE PREVENTIVE MEASURES IN PLACE  #  F/U PT EVALUATION AND RECOMMENDATION  #  D/C PLAN TO [facility] ONCE MEDICALLY STABLE AND COVID19 PCR NEGATIVE X 2-  POSITIVE COVID TEST 6/18 2020, ISOLATION PER HOSPITAL PROTOCOL   #  GI AND DVT PPX  -  DR. HAILE MONTOYA

## 2020-06-21 LAB
S PNEUM AG UR QL: NEGATIVE — SIGNIFICANT CHANGE UP

## 2020-06-21 RX ORDER — AMLODIPINE BESYLATE 2.5 MG/1
2.5 TABLET ORAL ONCE
Refills: 0 | Status: COMPLETED | OUTPATIENT
Start: 2020-06-21 | End: 2020-06-21

## 2020-06-21 RX ADMIN — Medication 1 MILLIGRAM(S): at 13:05

## 2020-06-21 RX ADMIN — Medication 1 DROP(S): at 06:15

## 2020-06-21 RX ADMIN — Medication 1 TABLET(S): at 13:04

## 2020-06-21 RX ADMIN — Medication 1000 UNIT(S): at 13:04

## 2020-06-21 RX ADMIN — HEPARIN SODIUM 5000 UNIT(S): 5000 INJECTION INTRAVENOUS; SUBCUTANEOUS at 06:21

## 2020-06-21 RX ADMIN — Medication 650 MILLIGRAM(S): at 21:13

## 2020-06-21 RX ADMIN — Medication 1 DROP(S): at 17:56

## 2020-06-21 RX ADMIN — Medication 325 MILLIGRAM(S): at 13:04

## 2020-06-21 RX ADMIN — AMLODIPINE BESYLATE 2.5 MILLIGRAM(S): 2.5 TABLET ORAL at 21:12

## 2020-06-21 RX ADMIN — HEPARIN SODIUM 5000 UNIT(S): 5000 INJECTION INTRAVENOUS; SUBCUTANEOUS at 13:05

## 2020-06-21 NOTE — PROGRESS NOTE ADULT - SUBJECTIVE AND OBJECTIVE BOX
Patient is a 79y old  Female who presents with a chief complaint of discharge from eyes, difficulty breathing (20 Jun 2020 09:14)    PATIENT IS SEEN AND EXAMINED IN MEDICAL FLOOR.      ALLERGIES:  iodine containing compounds (Unknown)  Milk (Unknown)  morphine (Unknown)  Orange Juice (Unknown)  Rice (Unknown)      VITALS:    Vital Signs Last 24 Hrs  T(C): 36.9 (21 Jun 2020 12:34), Max: 36.9 (21 Jun 2020 12:34)  T(F): 98.4 (21 Jun 2020 12:34), Max: 98.4 (21 Jun 2020 12:34)  HR: 79 (21 Jun 2020 12:34) (79 - 96)  BP: 161/90 (21 Jun 2020 12:34) (145/80 - 161/90)  BP(mean): --  RR: 17 (21 Jun 2020 12:34) (16 - 17)  SpO2: 100% (21 Jun 2020 12:34) (99% - 100%)    LABS:    CBC Full  -  ( 20 Jun 2020 07:08 )  WBC Count : 5.09 K/uL  RBC Count : 3.90 M/uL  Hemoglobin : 11.4 g/dL  Hematocrit : 39.1 %  Platelet Count - Automated : 195 K/uL  Mean Cell Volume : 100.3 fl  Mean Cell Hemoglobin : 29.2 pg  Mean Cell Hemoglobin Concentration : 29.2 gm/dL  Auto Neutrophil # : 1.98 K/uL  Auto Lymphocyte # : 2.01 K/uL  Auto Monocyte # : 0.66 K/uL  Auto Eosinophil # : 0.38 K/uL  Auto Basophil # : 0.02 K/uL  Auto Neutrophil % : 38.8 %  Auto Lymphocyte % : 39.5 %  Auto Monocyte % : 13.0 %  Auto Eosinophil % : 7.5 %  Auto Basophil % : 0.4 %      06-20    144  |  101  |  13  ----------------------------<  101<H>  3.8   |  40<H>  |  0.97    Ca    7.6<L>      20 Jun 2020 07:08      CAPILLARY BLOOD GLUCOSE              Creatinine Trend: 0.97<--, 1.09<--, 1.25<--, 1.48<--, 1.73<--  I&O's Summary          .Blood Blood-Peripheral  06-17 @ 09:52   No growth to date.  --  --      .Urine Clean Catch (Midstream)  06-17 @ 01:00   No growth  --  --          MEDICATIONS:    MEDICATIONS  (STANDING):  artificial  tears Solution 1 Drop(s) Both EYES two times a day  aspirin 325 milliGRAM(s) Oral daily  azithromycin  IVPB 500 milliGRAM(s) IV Intermittent every 24 hours  cholecalciferol 1000 Unit(s) Oral daily  ciprofloxacin  0.3% Ophthalmic Solution 1 Drop(s) Both EYES two times a day  dextrose 5% + sodium chloride 0.45%. 1000 milliLiter(s) (60 mL/Hr) IV Continuous <Continuous>  folic acid 1 milliGRAM(s) Oral daily  heparin   Injectable 5000 Unit(s) SubCutaneous every 8 hours  hydrocortisone 2.5% Rectal Cream 1 Application(s) Rectal at bedtime  levothyroxine 100 MICROGram(s) Oral daily  multivitamin/minerals 1 Tablet(s) Oral daily  pantoprazole    Tablet 40 milliGRAM(s) Oral before breakfast      MEDICATIONS  (PRN):  acetaminophen   Tablet .. 650 milliGRAM(s) Oral every 4 hours PRN Mild Pain (1 - 3)  ondansetron Injectable 4 milliGRAM(s) IV Push once PRN Nausea and/or Vomiting        REVIEW OF SYSTEMS:                           ALL ROS DONE [ X   ]      CONSTITUTIONAL:  LETHARGIC [   ], FEVER [   ], UNRESPONSIVE [   ]  CVS:  CP  [   ], SOB, [   ], PALPITATIONS [   ], DIZZYNESS [   ]  RS: COUGH [   ], SPUTUM [   ]  GI: ABDOMINAL PAIN [   ], NAUSEA [   ], VOMITINGS [   ], DIARRHEA [   ], CONSTIPATION [   ]  :  DYSURIA [   ], NOCTURIA [   ], INCREASED FREQUENCY [   ], DRIBLING [   ],  SKELETAL: PAINFUL JOINTS [   ], SWOLLEN JOINTS [   ], NECK ACHE [   ], LOW BACK ACHE [   ],  SKIN : ULCERS [   ], RASH [   ], ITCHING [   ]  CNS: HEAD ACHE [   ], DOUBLE VISION [   ], BLURRED VISION [   ], AMS / CONFUSION [   ], SEIZURES [   ], WEAKNESS [   ],TINGLING / NUMBNESS [   ]    PHYSICAL EXAMINATION:    GENERAL APPEARANCE: NO DISTRESS  HEENT:  NO PALLOR, NO  JVD,  NO   NODES, NECK SUPPLE  CVS: S1 +, S2 +,   RS: AEEB,  OCCASIONAL  RALES +,   NO RONCHI  ABD: SOFT, NT, NO, BS +  EXT:  PE +  SKIN: WARM,   SKELETAL:  ROM ACCEPTABLE  CNS:  AAO X  2-3  , NO  DEFICITS      RADIOLOGY :    < from: CT Abdomen and Pelvis No Cont (06.16.20 @ 18:41) >    IMPRESSION:     RIGHT lower lobe of posterior basilar segmental airspace consolidation. Increased interstitial markings in the periphery which may indicate chronic interstitial fibrosis. No pleural effusion.  Gross cardiomegaly. Heavily calcified mitral annulus.    No bowel obstruction or fecal impaction.  Cholelithiasis without secondary signs of acute cholecystitis.  No fracture  Remaining abdominal pelvic viscera unremarkable.    < end of copied text >        ASSESSMENT :     Acute dacryocystitis of right lacrimal sac  Pressure ulcer  Pain, low back  Ulcerative colitis  Osteoarthritis  Hypothyroidism  GERD (gastroesophageal reflux disease)  Hypertension  History of ankle surgery  H/O: hysterectomy      PLAN:  HPI:  79F with HTN, folate deficiency anemia, GERD, LBP, pressure ulcers, OA, ulcerative colitis, hypothyroidism, sent to ER from [facility] of difficulty breathing and lethargy. Patient on my assessment is AAOx3, reports having discharge form her eyes since couple days with no headache or visual changes. She also endorses difficulty breathing since couple days with difficulty laying flat, requiring 2 pillows at night. Denies chest pain, palpitations, cough or sick contacts. Endorses chronic swelling of both lower extremities. Patient also reports constipation since 10 days with decreased appetite but no nausea or vomiting. (16 Jun 2020 19:28)    #  REPEAT NASAL SWAB FOR COVID TO BE OBTAINED. D/W  PATIENT AND STAFF ON THE FLOOR  #  DC PLAN BACK TO [facility] WHEN STABLE    #  RIGHT LOWER LOBE INFILTRATE ON CT SCAN- ON ROCEPHIN AND AZITHROMYCIN, BLD CX- NGTD  #  COVID 19 INFECTION   #  DACROCYSTITIS, RIGHT EYE - STARTED ON CIPROFLOXACIN EYE DROPS  #  SAUL SUSPECT S/T PRERENAL AZOTEMIA - IMPROVING ON IVF  #  HYPOKALEMIA - RESOLVING  #  CONSTIPATION - RESOLVING   #  HYPOTHYROIDISM - ON INCREASED LEVOTHYROXINE 100MCG  #  ULCERATIVE COLITIS- STABLE - MONITOR FOR SYMPTOMS  #  PATIENT IS HIGH RISK FOR DEVELOPING PRESSURE ULCERS AND WORSENING OF PRESSURE ULCERS DESPITE PREVENTIVE MEASURES IN PLACE  #  F/U PT EVALUATION AND RECOMMENDATION  #  D/C PLAN TO [facility] ONCE MEDICALLY STABLE AND COVID19 PCR NEGATIVE X 2-  POSITIVE COVID TEST 6/18 2020, ISOLATION PER HOSPITAL PROTOCOL   #  GI AND DVT PPX  -  DR. HAILE MONTOYA

## 2020-06-22 DIAGNOSIS — E03.9 HYPOTHYROIDISM, UNSPECIFIED: ICD-10-CM

## 2020-06-22 DIAGNOSIS — U07.1 COVID-19: ICD-10-CM

## 2020-06-22 DIAGNOSIS — Z02.9 ENCOUNTER FOR ADMINISTRATIVE EXAMINATIONS, UNSPECIFIED: ICD-10-CM

## 2020-06-22 DIAGNOSIS — H04.301 UNSPECIFIED DACRYOCYSTITIS OF RIGHT LACRIMAL PASSAGE: ICD-10-CM

## 2020-06-22 LAB
ALBUMIN SERPL ELPH-MCNC: 2.9 G/DL — LOW (ref 3.5–5)
ALP SERPL-CCNC: 58 U/L — SIGNIFICANT CHANGE UP (ref 40–120)
ALT FLD-CCNC: 15 U/L DA — SIGNIFICANT CHANGE UP (ref 10–60)
ANION GAP SERPL CALC-SCNC: 3 MMOL/L — LOW (ref 5–17)
AST SERPL-CCNC: 18 U/L — SIGNIFICANT CHANGE UP (ref 10–40)
BASOPHILS # BLD AUTO: 0.03 K/UL — SIGNIFICANT CHANGE UP (ref 0–0.2)
BASOPHILS NFR BLD AUTO: 0.6 % — SIGNIFICANT CHANGE UP (ref 0–2)
BILIRUB SERPL-MCNC: 0.4 MG/DL — SIGNIFICANT CHANGE UP (ref 0.2–1.2)
BUN SERPL-MCNC: 10 MG/DL — SIGNIFICANT CHANGE UP (ref 7–18)
CALCIUM SERPL-MCNC: 7.9 MG/DL — LOW (ref 8.4–10.5)
CHLORIDE SERPL-SCNC: 101 MMOL/L — SIGNIFICANT CHANGE UP (ref 96–108)
CO2 SERPL-SCNC: 40 MMOL/L — HIGH (ref 22–31)
CREAT SERPL-MCNC: 0.89 MG/DL — SIGNIFICANT CHANGE UP (ref 0.5–1.3)
CULTURE RESULTS: SIGNIFICANT CHANGE UP
EOSINOPHIL # BLD AUTO: 0.27 K/UL — SIGNIFICANT CHANGE UP (ref 0–0.5)
EOSINOPHIL NFR BLD AUTO: 5.6 % — SIGNIFICANT CHANGE UP (ref 0–6)
GLUCOSE SERPL-MCNC: 88 MG/DL — SIGNIFICANT CHANGE UP (ref 70–99)
HCT VFR BLD CALC: 39.7 % — SIGNIFICANT CHANGE UP (ref 34.5–45)
HGB BLD-MCNC: 11.5 G/DL — SIGNIFICANT CHANGE UP (ref 11.5–15.5)
IMM GRANULOCYTES NFR BLD AUTO: 0.6 % — SIGNIFICANT CHANGE UP (ref 0–1.5)
LYMPHOCYTES # BLD AUTO: 1.69 K/UL — SIGNIFICANT CHANGE UP (ref 1–3.3)
LYMPHOCYTES # BLD AUTO: 34.9 % — SIGNIFICANT CHANGE UP (ref 13–44)
MCHC RBC-ENTMCNC: 28.9 PG — SIGNIFICANT CHANGE UP (ref 27–34)
MCHC RBC-ENTMCNC: 29 GM/DL — LOW (ref 32–36)
MCV RBC AUTO: 99.7 FL — SIGNIFICANT CHANGE UP (ref 80–100)
MONOCYTES # BLD AUTO: 0.54 K/UL — SIGNIFICANT CHANGE UP (ref 0–0.9)
MONOCYTES NFR BLD AUTO: 11.2 % — SIGNIFICANT CHANGE UP (ref 2–14)
NEUTROPHILS # BLD AUTO: 2.28 K/UL — SIGNIFICANT CHANGE UP (ref 1.8–7.4)
NEUTROPHILS NFR BLD AUTO: 47.1 % — SIGNIFICANT CHANGE UP (ref 43–77)
NRBC # BLD: 0 /100 WBCS — SIGNIFICANT CHANGE UP (ref 0–0)
PLATELET # BLD AUTO: 235 K/UL — SIGNIFICANT CHANGE UP (ref 150–400)
POTASSIUM SERPL-MCNC: 3.6 MMOL/L — SIGNIFICANT CHANGE UP (ref 3.5–5.3)
POTASSIUM SERPL-SCNC: 3.6 MMOL/L — SIGNIFICANT CHANGE UP (ref 3.5–5.3)
PROT SERPL-MCNC: 6.2 G/DL — SIGNIFICANT CHANGE UP (ref 6–8.3)
RBC # BLD: 3.98 M/UL — SIGNIFICANT CHANGE UP (ref 3.8–5.2)
RBC # FLD: 14.3 % — SIGNIFICANT CHANGE UP (ref 10.3–14.5)
SARS-COV-2 RNA SPEC QL NAA+PROBE: SIGNIFICANT CHANGE UP
SODIUM SERPL-SCNC: 144 MMOL/L — SIGNIFICANT CHANGE UP (ref 135–145)
SPECIMEN SOURCE: SIGNIFICANT CHANGE UP
WBC # BLD: 4.84 K/UL — SIGNIFICANT CHANGE UP (ref 3.8–10.5)
WBC # FLD AUTO: 4.84 K/UL — SIGNIFICANT CHANGE UP (ref 3.8–10.5)

## 2020-06-22 RX ORDER — LEVOTHYROXINE SODIUM 125 MCG
1 TABLET ORAL
Qty: 0 | Refills: 0 | DISCHARGE
Start: 2020-06-22

## 2020-06-22 RX ORDER — SENNA PLUS 8.6 MG/1
2 TABLET ORAL AT BEDTIME
Refills: 0 | Status: DISCONTINUED | OUTPATIENT
Start: 2020-06-22 | End: 2020-06-23

## 2020-06-22 RX ORDER — POLYETHYLENE GLYCOL 3350 17 G/17G
17 POWDER, FOR SOLUTION ORAL DAILY
Refills: 0 | Status: DISCONTINUED | OUTPATIENT
Start: 2020-06-22 | End: 2020-06-23

## 2020-06-22 RX ADMIN — HEPARIN SODIUM 5000 UNIT(S): 5000 INJECTION INTRAVENOUS; SUBCUTANEOUS at 22:00

## 2020-06-22 RX ADMIN — AZITHROMYCIN 255 MILLIGRAM(S): 500 TABLET, FILM COATED ORAL at 22:21

## 2020-06-22 RX ADMIN — Medication 1 MILLIGRAM(S): at 11:48

## 2020-06-22 RX ADMIN — Medication 100 MICROGRAM(S): at 06:12

## 2020-06-22 RX ADMIN — Medication 1 DROP(S): at 17:57

## 2020-06-22 RX ADMIN — Medication 1 DROP(S): at 06:13

## 2020-06-22 RX ADMIN — HEPARIN SODIUM 5000 UNIT(S): 5000 INJECTION INTRAVENOUS; SUBCUTANEOUS at 06:12

## 2020-06-22 RX ADMIN — SENNA PLUS 2 TABLET(S): 8.6 TABLET ORAL at 21:59

## 2020-06-22 RX ADMIN — Medication 1 APPLICATION(S): at 22:00

## 2020-06-22 RX ADMIN — Medication 1000 UNIT(S): at 11:48

## 2020-06-22 RX ADMIN — Medication 325 MILLIGRAM(S): at 11:48

## 2020-06-22 RX ADMIN — Medication 1 TABLET(S): at 11:48

## 2020-06-22 RX ADMIN — HEPARIN SODIUM 5000 UNIT(S): 5000 INJECTION INTRAVENOUS; SUBCUTANEOUS at 13:08

## 2020-06-22 RX ADMIN — PANTOPRAZOLE SODIUM 40 MILLIGRAM(S): 20 TABLET, DELAYED RELEASE ORAL at 06:12

## 2020-06-22 NOTE — PROGRESS NOTE ADULT - SUBJECTIVE AND OBJECTIVE BOX
NP Note discussed with  Primary Attending    Patient is a 79y old  Female who presents with a chief complaint of discharge from eyes, difficulty breathing (21 Jun 2020 16:58)      INTERVAL HPI/OVERNIGHT EVENTS: no new complaints    MEDICATIONS  (STANDING):  artificial  tears Solution 1 Drop(s) Both EYES two times a day  aspirin 325 milliGRAM(s) Oral daily  azithromycin  IVPB 500 milliGRAM(s) IV Intermittent every 24 hours  cholecalciferol 1000 Unit(s) Oral daily  ciprofloxacin  0.3% Ophthalmic Solution 1 Drop(s) Both EYES two times a day  dextrose 5% + sodium chloride 0.45%. 1000 milliLiter(s) (60 mL/Hr) IV Continuous <Continuous>  folic acid 1 milliGRAM(s) Oral daily  heparin   Injectable 5000 Unit(s) SubCutaneous every 8 hours  hydrocortisone 2.5% Rectal Cream 1 Application(s) Rectal at bedtime  levothyroxine 100 MICROGram(s) Oral daily  multivitamin/minerals 1 Tablet(s) Oral daily  pantoprazole    Tablet 40 milliGRAM(s) Oral before breakfast    MEDICATIONS  (PRN):  acetaminophen   Tablet .. 650 milliGRAM(s) Oral every 4 hours PRN Mild Pain (1 - 3)  ondansetron Injectable 4 milliGRAM(s) IV Push once PRN Nausea and/or Vomiting      __________________________________________________  REVIEW OF SYSTEMS:    CONSTITUTIONAL: No fever,   EYES: no acute visual disturbances  NECK: No pain or stiffness  RESPIRATORY: No cough; No shortness of breath  CARDIOVASCULAR: No chest pain, no palpitations  GASTROINTESTINAL: No pain. No nausea or vomiting; No diarrhea   NEUROLOGICAL: No headache or numbness, no tremors  MUSCULOSKELETAL: No joint pain, no muscle pain  GENITOURINARY: no dysuria, no frequency, no hesitancy  PSYCHIATRY: no depression , no anxiety  ALL OTHER  ROS negative        Vital Signs Last 24 Hrs  T(C): 36.6 (22 Jun 2020 05:26), Max: 36.7 (21 Jun 2020 20:25)  T(F): 97.8 (22 Jun 2020 05:26), Max: 98 (21 Jun 2020 20:25)  HR: 82 (22 Jun 2020 05:26) (82 - 115)  BP: 130/67 (22 Jun 2020 05:26) (130/67 - 163/100)  BP(mean): --  RR: 16 (22 Jun 2020 05:26) (16 - 16)  SpO2: 100% (22 Jun 2020 05:26) (100% - 100%)    ________________________________________________  PHYSICAL EXAM: obese, well groomed  GENERAL: NAD  HEENT: Normocephalic;  conjunctivae and sclerae clear; moist mucous membranes;   NECK : supple  CHEST/LUNG: Clear to auscultation bilaterally with good air entry   HEART: S1 S2  regular; no murmurs, gallops or rubs  ABDOMEN: Soft, Nontender, Nondistended; Bowel sounds present  EXTREMITIES: no cyanosis; no edema; no calf tenderness  SKIN: warm and dry; no rash  NERVOUS SYSTEM:  Awake and alert; Oriented  to place, person and time ; no new deficits    _________________________________________________  LABS:                        11.5   4.84  )-----------( 235      ( 22 Jun 2020 06:16 )             39.7     06-22    144  |  101  |  10  ----------------------------<  88  3.6   |  40<H>  |  0.89    Ca    7.9<L>      22 Jun 2020 06:16    TPro  6.2  /  Alb  2.9<L>  /  TBili  0.4  /  DBili  x   /  AST  18  /  ALT  15  /  AlkPhos  58  06-22        CAPILLARY BLOOD GLUCOSE    RADIOLOGY & ADDITIONAL TESTS:    CT Abdomen and Pelvis No Cont (06.16.20 @ 18:41) >  EXAM:  CT ABDOMEN AND PELVIS                          EXAM:  CT CHEST                            PROCEDURE DATE:  06/16/2020          INTERPRETATION:  CT of the chest, abdomen and pelvis.   COMPARISON: None  CLINICAL INFORMATION: Abdominal pain. Fecal impaction.  PROCEDURE:   100 ml of Omnipaque was injected intravenously. None was discarded  2.5 mm axial slice thickness images were obtained. Coronal and sagittal reformats were also obtained.     FINDINGS:    The airway shows normal caliber andcontour of trachea and LEFT/RIGHT mainstem bronchi..    There is a RIGHT lower lobe of posterior basilar airspace consolidation with air bronchograms.   There are bilateral peripheral increased interstitial markings likely indicating interstitial fibrosis. Remaining lung parenchyma clear..  There is no pleural effusion or pneumothorax.    There is gross cardiomegaly with uncoiled calcified thoracic aorta. No aneurysm.  There is a heavily calcified mitral annulus. Coronary arteries are calcified.  There are no mediastinal masses or lymphadenopathy.           There is no free intra-abdominal air or ascites.     The liver, spleen, pancreas, adrenal glands, are normal.  Dependent opacities and gallbladder indicate cholelithiasis without secondarysigns of acute cholecystitis.  There is no intra or extrahepatic biliary ductal dilatation.      No bowel obstruction or fecal impaction.  The stomach, duodenum, small and large bowel are within normal limits. Appendix not visualized.    Both kidneys show normal uptake of contrast media without masses or hydronephrosis.      The urinary bladder shows normal morphology and contour.    Status post hysterectomy.    There are no retroperitoneal masses or abnormal lymphadenopathy.  The retroperitonealvessels show atherosclerotic calcified plaques throughout  nondilated abdominal aorta and iliac arteries.  Marginal osteophytes are noted at multiple disc spaces in the spine. No fracture    IMPRESSION:     RIGHT lower lobe of posterior basilar segmental airspace consolidation. Increased interstitial markings in the periphery which may indicate chronic interstitial fibrosis. No pleural effusion.  Gross cardiomegaly. Heavily calcified mitral annulus.    No bowel obstruction or fecal impaction.    < end of copied text >      CT Chest No Cont (06.16.20 @ 18:40) >  EXAM:  CT ABDOMEN AND PELVIS                          EXAM:  CT CHEST                            PROCEDURE DATE:  06/16/2020          INTERPRETATION:  CT of the chest, abdomen and pelvis.   COMPARISON: None  CLINICAL INFORMATION: Abdominal pain. Fecal impaction.  PROCEDURE:   100 ml of Omnipaque was injected intravenously. None was discarded  2.5 mm axial slice thickness images were obtained. Coronal and sagittal reformats were also obtained.     FINDINGS:    The airway shows normal caliber andcontour of trachea and LEFT/RIGHT mainstem bronchi..    There is a RIGHT lower lobe of posterior basilar airspace consolidation with air bronchograms.   There are bilateral peripheral increased interstitial markings likely indicating interstitial fibrosis. Remaining lung parenchyma clear..  There is no pleural effusion or pneumothorax.    There is gross cardiomegaly with uncoiled calcified thoracic aorta. No aneurysm.  There is a heavily calcified mitral annulus. Coronary arteries are calcified.  There are no mediastinal masses or lymphadenopathy.           There is no free intra-abdominal air or ascites.     The liver, spleen, pancreas, adrenal glands, are normal.  Dependent opacities and gallbladder indicate cholelithiasis without secondarysigns of acute cholecystitis.  There is no intra or extrahepatic biliary ductal dilatation.      No bowel obstruction or fecal impaction.  The stomach, duodenum, small and large bowel are within normal limits. Appendix not visualized.    Both kidneysshow normal uptake of contrast media without masses or hydronephrosis.      The urinary bladder shows normal morphology and contour.    Status post hysterectomy.    There are no retroperitoneal masses or abnormal lymphadenopathy.  The retroperitonealvessels show atherosclerotic calcified plaques throughout  nondilated abdominal aorta and iliac arteries.  Marginal osteophytes are noted at multiple disc spaces in the spine. No fracture    IMPRESSION:     RIGHT lower lobe of posterior basilar segmental airspace consolidation. Increased interstitial markings in the periphery which may indicate chronic interstitial fibrosis. No pleural effusion.  Gross cardiomegaly. Heavily calcified mitral annulus.    No bowel obstruction or fecal impaction.  Cholelithiasis without secondary signs of acute cholecystitis.  No fracture  Remaining abdominal pelvic viscera unremarkable.    < end of copied text >    Xray Chest 1 View-PORTABLE IMMEDIATE (06.16.20 @ 15:26) >  EXAM:  XR CHEST PORTABLE IMMED 1V                            PROCEDURE DATE:  06/16/2020          INTERPRETATION:  AP semierect chest on June 16, 2020 at 3:29 PM. Patient has chest pain.    COMPARISON: None available.    Shallow inspiration crowds the chest. Heart size cannot be assessed.    There is a mild right base pleural pulmonary reaction.    IMPRESSION: Mild right base reaction.    < end of copied text >        Imaging Personally Reviewed:  YES/NO    Consultant(s) Notes Reviewed:   YES/ No    Care Discussed with Consultants :     Plan of care was discussed with patient and /or primary care giver; all questions and concerns were addressed and care was aligned with patient's wishes.

## 2020-06-22 NOTE — CHART NOTE - NSCHARTNOTEFT_GEN_A_CORE
EVENT:      78 y/o female with HTN, folate deficiency anemia, GERD, LBP, pressure ulcers, OA, ulcerative colitis, hypothyroidism, sent to ER from [facility] of difficulty breathing and lethargy. Patient  was AAOx3, reported having discharge form her eyes since couple days with no headache or visual changes. She also endorsed difficulty breathing since couple days with difficulty laying flat, requiring 2 pillows at night.   6/22/20, 8pm, patient's BP - 163/100, HR - 115.     OBJECTIVE:  Vital Signs Last 24 Hrs  T(C): 36.7 (21 Jun 2020 20:25), Max: 36.9 (21 Jun 2020 12:34)  T(F): 98 (21 Jun 2020 20:25), Max: 98.4 (21 Jun 2020 12:34)  HR: 115 (21 Jun 2020 20:25) (79 - 115)  BP: 163/100 (21 Jun 2020 20:25) (146/76 - 163/100)  BP(mean): --  RR: 16 (21 Jun 2020 20:25) (16 - 17)  SpO2: 100% (21 Jun 2020 20:25) (99% - 100%)    FOCUSED PHYSICAL EXAM:    LABS:                        11.4   5.09  )-----------( 195      ( 20 Jun 2020 07:08 )             39.1     06-20    144  |  101  |  13  ----------------------------<  101<H>  3.8   |  40<H>  |  0.97    Ca    7.6<L>      20 Jun 2020 07:08    PLAN: Norvasc 2.5 mg po x 1 dose.     FOLLOW UP / RESULT: Reevaluate patient VS. Maintain safety and comfort.

## 2020-06-22 NOTE — PROGRESS NOTE ADULT - PROBLEM SELECTOR PLAN 2
COVID19 positive on 6/18-asymptomatic  -COVID19 negative 6/21  -f/u repeat, needs 2 negatives prior to discharge

## 2020-06-22 NOTE — DIETITIAN INITIAL EVALUATION ADULT. - PROBLEM SELECTOR PLAN 1
-presented with dyspnea,, poor intake, afebrile, with no leucocytosis, physical exam with pedal edema  -ekg noted NSR with no acute st-tw changes, trop negative, pro bnp 300  -CT chest noted with consolidation/bronchogram in RLL  -Will start azithro, Rocephin  -Monitor respiratory status, f/u echo  -F/u blood cultures, legionella and strep Ags

## 2020-06-22 NOTE — PROGRESS NOTE ADULT - REASON FOR ADMISSION
discharge from eyes, difficulty breathing

## 2020-06-22 NOTE — DIETITIAN INITIAL EVALUATION ADULT. - PROBLEM SELECTOR PLAN 2
-presented with discharge from both eyes with no noted conjunctivel erythema however has edema of eyelids s/o blepharitis with swelling at lateral aspect of R eye with concern for dacrocystitis  -on iv antibiotics as above for CAP, will start cipro eye drops+aritifical tears  -f/u food allergy and RAST tests

## 2020-06-22 NOTE — DIETITIAN INITIAL EVALUATION ADULT. - OTHER INFO
Pt on Airborne isolation. Pt w covid +. Pt is from [facility]. D/W Pt via phone. Per Pt Appetite is fair. Per PCA  pt drinks Juice and liquids but po intake is Minimum. For B fats today pt did not eat much. Foof choices updated. Offered Nutrient dense Foods and supplement ( Ensure clear). F & N Dept Notified.

## 2020-06-22 NOTE — DIETITIAN INITIAL EVALUATION ADULT. - PROBLEM SELECTOR PLAN 3
-presented with bun/cr of 35/1.73, suspect prerenal corinna in setting of dehydration  -received iv fluids in ER, will hold off lasix for now  -f/u bmp in am

## 2020-06-22 NOTE — PROGRESS NOTE ADULT - ASSESSMENT
79F with HTN, folate deficiency anemia, GERD, LBP, pressure ulcers, OA, ulcerative colitis, hypothyroidism, sent to ER from [facility] of difficulty breathing and lethargy. Patient on my assessment is AAOx3, reports having discharge form her eyes since couple days with no headache or visual changes. She also endorses difficulty breathing since couple days with difficulty laying flat, requiring 2 pillows at night. Denies chest pain, palpitations, cough or sick contacts. Endorses chronic swelling of both lower extremities. Patient also reports constipation since 10 days with decreased appetite but no nausea or vomiting.     Pt. seen and examined, noted in NAD, reports no further discharge from eye and resolved constipation, wants to go back to [facility].  Pt. with COVID19 negative 6/21, will repeat for second negative prior to discharge.
PATIENT IS SEEN AND EXAMINED     #  RIGHT LOWER LOBE INFILTRATE ON CT SCAN- ON ROCEPHIN AND AZITHROMYCIN, BLD CX- NGTD  #  COVID 19 INFECTION   #  DACROCYSTITIS, RIGHT EYE - STARTED ON CIPROFLOXACIN EYE DROPS  #  SAUL SUSPECT S/T PRERENAL AZOTEMIA - IMPROVING ON IVF  #  HYPOKALEMIA - RESOLVING  #  CONSTIPATION - RESOLVING   #  HYPOTHYROIDISM - ON INCREASED LEVOTHYROXINE 100MCG  #  ULCERATIVE COLITIS- STABLE - MONITOR FOR SYMPTOMS  #  PATIENT IS HIGH RISK FOR DEVELOPING PRESSURE ULCERS AND WORSENING OF PRESSURE ULCERS DESPITE PREVENTIVE MEASURES IN PLACE  #  F/U PT EVALUATION AND RECOMMENDATION  #  D/C PLAN TO [facility] ONCE MEDICALLY STABLE AND COVID19 PCR NEGATIVE X 2-  POSITIVE COVID TEST 6/18 2020, ISOLATION PER HOSPITAL PROTOCOL   #  GI AND DVT PPX  -  DR. HAILE MONTOYA

## 2020-06-22 NOTE — PROGRESS NOTE ADULT - ATTENDING COMMENTS
PATIENT IS SEEN AND EXAMINED    #  REPEAT NASAL SWAB FOR COVID TEST IS NEGATIVE ON 06- X 1 TIME. REPEAT NASAL SWAB FOR COVID IN 3 DAYS. . D/W  PATIENT AND STAFF ON THE FLOOR  #  DC PLAN BACK TO [facility] WHEN STABLE    #  RIGHT LOWER LOBE INFILTRATE ON CT SCAN- S/P COMPLETION OF AZITHROMYCIN , BLD CX- NGTD  #  COVID 19 INFECTION   #  DACROCYSTITIS, RIGHT EYE - STARTED ON CIPROFLOXACIN EYE DROPS  #  SAUL SUSPECT S/T PRERENAL AZOTEMIA - IMPROVED ON IVF  #  HYPOKALEMIA - RESOLVED  #  CONSTIPATION - RESOLVED   #  HYPOTHYROIDISM - ON INCREASED LEVOTHYROXINE 100MCG  #  ULCERATIVE COLITIS- STABLE - MONITOR FOR SYMPTOMS  #  PATIENT IS HIGH RISK FOR DEVELOPING PRESSURE ULCERS AND WORSENING OF PRESSURE ULCERS DESPITE PREVENTIVE MEASURES IN PLACE  #  F/U PT EVALUATION AND RECOMMENDATION  #  D/C PLAN TO [facility] ONCE MEDICALLY STABLE AND COVID19 PCR NEGATIVE X 2-  POSITIVE COVID TEST 6/18 2020, ISOLATION PER HOSPITAL PROTOCOL   #  GI AND DVT PPX  -  DR. HAILE MONTOYA

## 2020-06-23 ENCOUNTER — TRANSCRIPTION ENCOUNTER (OUTPATIENT)
Age: 80
End: 2020-06-23

## 2020-06-23 VITALS
SYSTOLIC BLOOD PRESSURE: 153 MMHG | RESPIRATION RATE: 16 BRPM | DIASTOLIC BLOOD PRESSURE: 84 MMHG | HEART RATE: 100 BPM | TEMPERATURE: 99 F | OXYGEN SATURATION: 99 %

## 2020-06-23 LAB
ANION GAP SERPL CALC-SCNC: 3 MMOL/L — LOW (ref 5–17)
BUN SERPL-MCNC: 9 MG/DL — SIGNIFICANT CHANGE UP (ref 7–18)
CALCIUM SERPL-MCNC: 7.9 MG/DL — LOW (ref 8.4–10.5)
CHLORIDE SERPL-SCNC: 102 MMOL/L — SIGNIFICANT CHANGE UP (ref 96–108)
CO2 SERPL-SCNC: 39 MMOL/L — HIGH (ref 22–31)
CREAT SERPL-MCNC: 0.94 MG/DL — SIGNIFICANT CHANGE UP (ref 0.5–1.3)
GLUCOSE SERPL-MCNC: 82 MG/DL — SIGNIFICANT CHANGE UP (ref 70–99)
HCT VFR BLD CALC: 39.4 % — SIGNIFICANT CHANGE UP (ref 34.5–45)
HGB BLD-MCNC: 11.5 G/DL — SIGNIFICANT CHANGE UP (ref 11.5–15.5)
MCHC RBC-ENTMCNC: 28.9 PG — SIGNIFICANT CHANGE UP (ref 27–34)
MCHC RBC-ENTMCNC: 29.2 GM/DL — LOW (ref 32–36)
MCV RBC AUTO: 99 FL — SIGNIFICANT CHANGE UP (ref 80–100)
NRBC # BLD: 0 /100 WBCS — SIGNIFICANT CHANGE UP (ref 0–0)
PLATELET # BLD AUTO: 234 K/UL — SIGNIFICANT CHANGE UP (ref 150–400)
POTASSIUM SERPL-MCNC: 3.8 MMOL/L — SIGNIFICANT CHANGE UP (ref 3.5–5.3)
POTASSIUM SERPL-SCNC: 3.8 MMOL/L — SIGNIFICANT CHANGE UP (ref 3.5–5.3)
RBC # BLD: 3.98 M/UL — SIGNIFICANT CHANGE UP (ref 3.8–5.2)
RBC # FLD: 14.2 % — SIGNIFICANT CHANGE UP (ref 10.3–14.5)
SARS-COV-2 RNA SPEC QL NAA+PROBE: SIGNIFICANT CHANGE UP
SODIUM SERPL-SCNC: 144 MMOL/L — SIGNIFICANT CHANGE UP (ref 135–145)
WBC # BLD: 5.63 K/UL — SIGNIFICANT CHANGE UP (ref 3.8–10.5)
WBC # FLD AUTO: 5.63 K/UL — SIGNIFICANT CHANGE UP (ref 3.8–10.5)

## 2020-06-23 PROCEDURE — 80048 BASIC METABOLIC PNL TOTAL CA: CPT

## 2020-06-23 PROCEDURE — 80061 LIPID PANEL: CPT

## 2020-06-23 PROCEDURE — 87040 BLOOD CULTURE FOR BACTERIA: CPT

## 2020-06-23 PROCEDURE — 85730 THROMBOPLASTIN TIME PARTIAL: CPT

## 2020-06-23 PROCEDURE — 83880 ASSAY OF NATRIURETIC PEPTIDE: CPT

## 2020-06-23 PROCEDURE — 97161 PT EVAL LOW COMPLEX 20 MIN: CPT

## 2020-06-23 PROCEDURE — 85610 PROTHROMBIN TIME: CPT

## 2020-06-23 PROCEDURE — 85027 COMPLETE CBC AUTOMATED: CPT

## 2020-06-23 PROCEDURE — 87640 STAPH A DNA AMP PROBE: CPT

## 2020-06-23 PROCEDURE — 81003 URINALYSIS AUTO W/O SCOPE: CPT

## 2020-06-23 PROCEDURE — 71250 CT THORAX DX C-: CPT

## 2020-06-23 PROCEDURE — 80053 COMPREHEN METABOLIC PANEL: CPT

## 2020-06-23 PROCEDURE — U0003: CPT

## 2020-06-23 PROCEDURE — 82306 VITAMIN D 25 HYDROXY: CPT

## 2020-06-23 PROCEDURE — 36415 COLL VENOUS BLD VENIPUNCTURE: CPT

## 2020-06-23 PROCEDURE — 82962 GLUCOSE BLOOD TEST: CPT

## 2020-06-23 PROCEDURE — 86003 ALLG SPEC IGE CRUDE XTRC EA: CPT

## 2020-06-23 PROCEDURE — 84443 ASSAY THYROID STIM HORMONE: CPT

## 2020-06-23 PROCEDURE — 74176 CT ABD & PELVIS W/O CONTRAST: CPT

## 2020-06-23 PROCEDURE — 93005 ELECTROCARDIOGRAM TRACING: CPT

## 2020-06-23 PROCEDURE — 99285 EMERGENCY DEPT VISIT HI MDM: CPT

## 2020-06-23 PROCEDURE — 71045 X-RAY EXAM CHEST 1 VIEW: CPT

## 2020-06-23 PROCEDURE — 86769 SARS-COV-2 COVID-19 ANTIBODY: CPT

## 2020-06-23 PROCEDURE — 97530 THERAPEUTIC ACTIVITIES: CPT

## 2020-06-23 PROCEDURE — 87086 URINE CULTURE/COLONY COUNT: CPT

## 2020-06-23 PROCEDURE — 87899 AGENT NOS ASSAY W/OPTIC: CPT

## 2020-06-23 PROCEDURE — 82607 VITAMIN B-12: CPT

## 2020-06-23 PROCEDURE — 87641 MR-STAPH DNA AMP PROBE: CPT

## 2020-06-23 PROCEDURE — 87449 NOS EACH ORGANISM AG IA: CPT

## 2020-06-23 PROCEDURE — 84484 ASSAY OF TROPONIN QUANT: CPT

## 2020-06-23 PROCEDURE — 82746 ASSAY OF FOLIC ACID SERUM: CPT

## 2020-06-23 RX ORDER — POLYETHYLENE GLYCOL 3350 17 G/17G
17 POWDER, FOR SOLUTION ORAL
Qty: 510 | Refills: 0
Start: 2020-06-23 | End: 2020-07-22

## 2020-06-23 RX ORDER — SENNA PLUS 8.6 MG/1
2 TABLET ORAL
Qty: 60 | Refills: 0
Start: 2020-06-23 | End: 2020-07-22

## 2020-06-23 RX ORDER — AMLODIPINE BESYLATE 2.5 MG/1
1 TABLET ORAL
Qty: 30 | Refills: 0
Start: 2020-06-23 | End: 2020-07-22

## 2020-06-23 RX ORDER — CIPROFLOXACIN HCL 0.3 %
1 DROPS OPHTHALMIC (EYE)
Qty: 1 | Refills: 0
Start: 2020-06-23 | End: 2020-06-29

## 2020-06-23 RX ADMIN — Medication 1000 UNIT(S): at 11:35

## 2020-06-23 RX ADMIN — Medication 1 MILLIGRAM(S): at 11:35

## 2020-06-23 RX ADMIN — Medication 100 MICROGRAM(S): at 06:23

## 2020-06-23 RX ADMIN — Medication 1 TABLET(S): at 11:35

## 2020-06-23 RX ADMIN — Medication 325 MILLIGRAM(S): at 11:35

## 2020-06-23 RX ADMIN — HEPARIN SODIUM 5000 UNIT(S): 5000 INJECTION INTRAVENOUS; SUBCUTANEOUS at 06:20

## 2020-06-23 RX ADMIN — Medication 1 DROP(S): at 06:20

## 2020-06-23 RX ADMIN — Medication 1 DROP(S): at 17:52

## 2020-06-23 RX ADMIN — PANTOPRAZOLE SODIUM 40 MILLIGRAM(S): 20 TABLET, DELAYED RELEASE ORAL at 06:23

## 2020-06-23 RX ADMIN — Medication 1 DROP(S): at 17:53

## 2020-06-23 NOTE — DISCHARGE NOTE NURSING/CASE MANAGEMENT/SOCIAL WORK - PATIENT PORTAL LINK FT
You can access the FollowMyHealth Patient Portal offered by [facility] by registering at the following website: [email or web address]. By joining [employer]’s FollowMyHealth portal, you will also be able to view your health information using other applications (apps) compatible with our system.

## 2020-09-03 ENCOUNTER — INPATIENT (INPATIENT)
Facility: HOSPITAL | Age: 80
LOS: 4 days | Discharge: NURSING, REHAB OR LONG-TERM CARE | DRG: 291 | End: 2020-09-08
Attending: INTERNAL MEDICINE | Admitting: INTERNAL MEDICINE
Payer: MEDICARE

## 2020-09-03 VITALS
SYSTOLIC BLOOD PRESSURE: 127 MMHG | TEMPERATURE: 98 F | WEIGHT: 171.3 LBS | RESPIRATION RATE: 20 BRPM | HEART RATE: 84 BPM | OXYGEN SATURATION: 99 % | DIASTOLIC BLOOD PRESSURE: 85 MMHG

## 2020-09-03 DIAGNOSIS — Z90.710 ACQUIRED ABSENCE OF BOTH CERVIX AND UTERUS: Chronic | ICD-10-CM

## 2020-09-03 DIAGNOSIS — R06.89 OTHER ABNORMALITIES OF BREATHING: ICD-10-CM

## 2020-09-03 DIAGNOSIS — Z98.890 OTHER SPECIFIED POSTPROCEDURAL STATES: Chronic | ICD-10-CM

## 2020-09-03 LAB
ALBUMIN SERPL ELPH-MCNC: 3.1 G/DL — LOW (ref 3.5–5)
ALP SERPL-CCNC: 62 U/L — SIGNIFICANT CHANGE UP (ref 40–120)
ALT FLD-CCNC: 16 U/L DA — SIGNIFICANT CHANGE UP (ref 10–60)
ANION GAP SERPL CALC-SCNC: -12 MMOL/L — LOW (ref 5–17)
AST SERPL-CCNC: 19 U/L — SIGNIFICANT CHANGE UP (ref 10–40)
BASE EXCESS BLDA CALC-SCNC: 17.8 MMOL/L — HIGH (ref -2–2)
BASOPHILS # BLD AUTO: 0.04 K/UL — SIGNIFICANT CHANGE UP (ref 0–0.2)
BASOPHILS NFR BLD AUTO: 0.5 % — SIGNIFICANT CHANGE UP (ref 0–2)
BILIRUB SERPL-MCNC: 0.3 MG/DL — SIGNIFICANT CHANGE UP (ref 0.2–1.2)
BLOOD GAS COMMENTS ARTERIAL: SIGNIFICANT CHANGE UP
BUN SERPL-MCNC: 31 MG/DL — HIGH (ref 7–18)
CALCIUM SERPL-MCNC: 8.3 MG/DL — LOW (ref 8.4–10.5)
CHLORIDE SERPL-SCNC: 98 MMOL/L — SIGNIFICANT CHANGE UP (ref 96–108)
CO2 SERPL-SCNC: 58 MMOL/L — CRITICAL HIGH (ref 22–31)
CREAT SERPL-MCNC: 1.33 MG/DL — HIGH (ref 0.5–1.3)
EOSINOPHIL # BLD AUTO: 0.18 K/UL — SIGNIFICANT CHANGE UP (ref 0–0.5)
EOSINOPHIL NFR BLD AUTO: 2.1 % — SIGNIFICANT CHANGE UP (ref 0–6)
GLUCOSE SERPL-MCNC: 139 MG/DL — HIGH (ref 70–99)
HCO3 BLDA-SCNC: 49 MMOL/L — HIGH (ref 23–27)
HCT VFR BLD CALC: 38.6 % — SIGNIFICANT CHANGE UP (ref 34.5–45)
HGB BLD-MCNC: 10.5 G/DL — LOW (ref 11.5–15.5)
HOROWITZ INDEX BLDA+IHG-RTO: 28 — SIGNIFICANT CHANGE UP
IMM GRANULOCYTES NFR BLD AUTO: 0.9 % — SIGNIFICANT CHANGE UP (ref 0–1.5)
LYMPHOCYTES # BLD AUTO: 2.03 K/UL — SIGNIFICANT CHANGE UP (ref 1–3.3)
LYMPHOCYTES # BLD AUTO: 23.4 % — SIGNIFICANT CHANGE UP (ref 13–44)
MCHC RBC-ENTMCNC: 27.2 GM/DL — LOW (ref 32–36)
MCHC RBC-ENTMCNC: 29.9 PG — SIGNIFICANT CHANGE UP (ref 27–34)
MCV RBC AUTO: 110 FL — HIGH (ref 80–100)
MONOCYTES # BLD AUTO: 0.97 K/UL — HIGH (ref 0–0.9)
MONOCYTES NFR BLD AUTO: 11.2 % — SIGNIFICANT CHANGE UP (ref 2–14)
NEUTROPHILS # BLD AUTO: 5.36 K/UL — SIGNIFICANT CHANGE UP (ref 1.8–7.4)
NEUTROPHILS NFR BLD AUTO: 61.9 % — SIGNIFICANT CHANGE UP (ref 43–77)
NRBC # BLD: 0 /100 WBCS — SIGNIFICANT CHANGE UP (ref 0–0)
PCO2 BLDA: 118 MMHG — CRITICAL HIGH (ref 32–46)
PH BLDA: 7.24 — LOW (ref 7.35–7.45)
PLATELET # BLD AUTO: 193 K/UL — SIGNIFICANT CHANGE UP (ref 150–400)
PO2 BLDA: 174 MMHG — HIGH (ref 74–108)
POTASSIUM SERPL-MCNC: 4.4 MMOL/L — SIGNIFICANT CHANGE UP (ref 3.5–5.3)
POTASSIUM SERPL-SCNC: 4.4 MMOL/L — SIGNIFICANT CHANGE UP (ref 3.5–5.3)
PROT SERPL-MCNC: 7 G/DL — SIGNIFICANT CHANGE UP (ref 6–8.3)
RBC # BLD: 3.51 M/UL — LOW (ref 3.8–5.2)
RBC # FLD: 13.6 % — SIGNIFICANT CHANGE UP (ref 10.3–14.5)
SAO2 % BLDA: 100 % — HIGH (ref 92–96)
SARS-COV-2 RNA SPEC QL NAA+PROBE: SIGNIFICANT CHANGE UP
SODIUM SERPL-SCNC: 144 MMOL/L — SIGNIFICANT CHANGE UP (ref 135–145)
WBC # BLD: 8.66 K/UL — SIGNIFICANT CHANGE UP (ref 3.8–10.5)
WBC # FLD AUTO: 8.66 K/UL — SIGNIFICANT CHANGE UP (ref 3.8–10.5)

## 2020-09-03 PROCEDURE — 99285 EMERGENCY DEPT VISIT HI MDM: CPT | Mod: 25

## 2020-09-03 PROCEDURE — 99232 SBSQ HOSP IP/OBS MODERATE 35: CPT | Mod: GC

## 2020-09-03 PROCEDURE — 93010 ELECTROCARDIOGRAM REPORT: CPT

## 2020-09-03 PROCEDURE — 71045 X-RAY EXAM CHEST 1 VIEW: CPT | Mod: 26,CS

## 2020-09-03 RX ORDER — ALBUTEROL 90 UG/1
1 AEROSOL, METERED ORAL EVERY 4 HOURS
Refills: 0 | Status: DISCONTINUED | OUTPATIENT
Start: 2020-09-03 | End: 2020-09-08

## 2020-09-03 RX ORDER — IPRATROPIUM/ALBUTEROL SULFATE 18-103MCG
6 AEROSOL WITH ADAPTER (GRAM) INHALATION ONCE
Refills: 0 | Status: COMPLETED | OUTPATIENT
Start: 2020-09-03 | End: 2020-09-03

## 2020-09-03 RX ORDER — HEPARIN SODIUM 5000 [USP'U]/ML
5000 INJECTION INTRAVENOUS; SUBCUTANEOUS EVERY 8 HOURS
Refills: 0 | Status: DISCONTINUED | OUTPATIENT
Start: 2020-09-03 | End: 2020-09-04

## 2020-09-03 RX ORDER — FAMOTIDINE 10 MG/ML
20 INJECTION INTRAVENOUS ONCE
Refills: 0 | Status: COMPLETED | OUTPATIENT
Start: 2020-09-03 | End: 2020-09-03

## 2020-09-03 RX ORDER — DIPHENHYDRAMINE HCL 50 MG
25 CAPSULE ORAL ONCE
Refills: 0 | Status: COMPLETED | OUTPATIENT
Start: 2020-09-03 | End: 2020-09-03

## 2020-09-03 RX ADMIN — Medication 125 MILLIGRAM(S): at 15:53

## 2020-09-03 RX ADMIN — FAMOTIDINE 20 MILLIGRAM(S): 10 INJECTION INTRAVENOUS at 15:53

## 2020-09-03 RX ADMIN — Medication 25 MILLIGRAM(S): at 15:53

## 2020-09-03 NOTE — CONSULT NOTE ADULT - ATTENDING COMMENTS
Patient is an 81 y/o female with PMH of HTN, ulcerative colitis, dacrocyclitis, hypothyroidism and failure to thrive admitted from a skilled nursing facility with a chief complaint of worsening eye swelling despite using eye drops prescribed on last admission. She denies ever complaining of SOB. The patient however had an ABG done which showed her chronic hypercapnea and was placed on non invasive positive pressure ventilation. I saw the patient and immediately suspected hyperoxia induced ( worsening ) hypercapnea. I recommended removal of bipap and a reduction in the FIO2 from 2L ( original ABG done on ) to 1L with a goal/target saturation of 88-92%. This was done and the patient requested to be sent back to the nursing home ( says the reason she's here is for eye swelling - which bipap could make worse. I reviewed all laboratory and roentgenographic data and any previous medical record from [facility] that existed. I also discussed the case with the ED attending or referring physician.

## 2020-09-03 NOTE — ED ADULT NURSE NOTE - NSIMPLEMENTINTERV_GEN_ALL_ED
Implemented All Fall with Harm Risk Interventions:  [place] to call system. Call bell, personal items and telephone within reach. Instruct patient to call for assistance. Room bathroom lighting operational. Non-slip footwear when patient is off stretcher. Physically safe environment: no spills, clutter or unnecessary equipment. Stretcher in lowest position, wheels locked, appropriate side rails in place. Provide visual cue, wrist band, yellow gown, etc. Monitor gait and stability. Monitor for mental status changes and reorient to person, place, and time. Review medications for side effects contributing to fall risk. Reinforce activity limits and safety measures with patient and family. Provide visual clues: red socks.

## 2020-09-03 NOTE — ED PROVIDER NOTE - CLINICAL SUMMARY MEDICAL DECISION MAKING FREE TEXT BOX
Patient presenting for eye swelling. history of similar 2/2 allergic reaction. will treat allergic reaction, ed obs and reassess

## 2020-09-03 NOTE — ED ADULT NURSE NOTE - OBJECTIVE STATEMENT
pt is here for facial swelling.  BIBA, sending by [facility], facial swelling and eyes on and off, denied sob or chest pain, bedbound, H/O asthma, no distress noted at this time. pt is here for facial swelling.  BIBA, sending by [facility], facial swelling and eyes on and off, denied sob or chest pain, bedbound, H/O asthma, b/l lower extremities edema, no distress noted at this time.

## 2020-09-03 NOTE — ED PROVIDER NOTE - CARE PLAN
Principal Discharge DX:	Hypercarbia  Secondary Diagnosis:	Mild asthma, unspecified whether complicated, unspecified whether persistent

## 2020-09-03 NOTE — ED PROVIDER NOTE - PROGRESS NOTE DETAILS
Patient was started on bipap, evaluated by dr gonzalez, icu, per dr gonzalez, patient appear stable, saturation stable after being taken of bipap, does not need bipap. patient awake, alert, saturation approx 92% on 1 L NC. admitted for asthma excerebration to dr alvarez

## 2020-09-03 NOTE — ED PROVIDER NOTE - PHYSICAL EXAMINATION
b/l eyelid swelling   , right eye lid great than left  no erythema or warmth  no restriction to occular movement  no tongue swelling

## 2020-09-03 NOTE — ED PROVIDER NOTE - CADM POA PRESS ULCER
CHIEF COMPLAINT:    Amador is a 8 y.o. male presenting for dysuria.  PRESENTING ILLNESS:    Nando English is a 8 y.o. male who presents for dysuria. His last clinic visit was 11/18/19. He is accompanied by his grandmother (legal guardian) and uncle.    Today patient presents to clinic for f/u dysuria and bedwetting. Patient continues to have dysuria, with every void. He reports he feels burning prior to urinating and when his stream starts. It usually subsides a few seconds later. Denies split stream. FOS good. He continues to hold for long periods of time during the day. His teachers to allow him to void during the school day when needed. Denies frequency, urgency or daytime accidents. Denies hesitancy. Denies hematuria or flank pain. He has improved with nocturnal enuresis and only wets ~ 1 time per week. His grandmother has been limiting his intake at night and making sure he voids before bedtime. He continues to have issues with constipation. He is on miralax 1 capful daily and is only having a bowel movement 3 times per week, or less. He last saw GI in 2017. His pediatrician ordered referral to GI but no appointment was made yet. He drinks plenty of water during the day and almond milk. He has stopped drinking coffee and soda with caffeine or drinks with red dye. Denies fever or chills.    Initial clinic visit:  Today patient presents to clinic for dysuria for the past month. He was seen by pediatrician 10/22/19 and ED 10/29/19 for similar complaint. He was treated for constipation with miralax. Urine culture 10/22/19 was negative. He continues to c/o burning at the beginning of his stream. Burning subsides after stream starts. He was also having hesitancy but that has improved. He is taking miralax 1 capful per day but still having issues with constipation. He has a bowel movement every 3 days of hard consistency. He has seen GI in the past for issues with constipation. He drinks 3 16 oz bottles of water per  day and almond milk. He will have coffee or soda occasionally. He continues to hold his urine throughout the day for long periods of time. He will go through the school day without voiding. No daytime accidents, frequency or urgency. Denies hx of UTI's or penile infections. Denies split stream, hematuria or flank pain. Denies fever or chills.   His grandmother reports nocturnal enuresis. He wets 4 nights per week on average. Has not been dry at night for 6 months or more. There is a family hx of bedwetting. He has ADHD and takes medication for it. Has constipation. He drinks water until bedtime and does not have fluid restriction at night. No snoring. Has not tried any medication for bedwetting in the past.    REVIEW OF SYSTEMS:    Review of Systems    Constitutional: Negative for fever and chills.   HENT: Negative for hearing loss.   Eyes: Negative for visual disturbance.   Respiratory: Negative for wheezing or cough.   Cardiovascular: Negative for chest pain.   Gastrointestinal: Positive constipation. Negative for nausea, vomiting.   Genitourinary:  See HPI  Neurological: Negative for headache or seizure.   Hematological: Does not bruise/bleed easily.   Psychiatric/Behavioral: Positive for hyperactivity. Negative for agitation.       PATIENT HISTORY:    Past Medical History:   Diagnosis Date    Meconium aspiration     Otitis media        Past Surgical History:   Procedure Laterality Date    ADENOIDECTOMY W/ MYRINGOTOMY AND TUBES      COLONOSCOPY N/A 5/8/2017    Procedure: COLONOSCOPY;  Surgeon: Alex Hall MD;  Location: [facility] ([address] [place] [address]);  Service: Endoscopy;  Laterality: N/A;    EXCISION OF LESION Right 5/29/2018    Procedure: EXCISION-LESION scalp;  Surgeon: Mc Trejo MD;  Location: [address];  Service: Plastics;  Laterality: Right;       Family History   Problem Relation Age of Onset    Congenital heart disease Maternal Grandmother         sinus venosus asd; Nikunj Ochsner repaired  it    Arrhythmia Maternal Grandmother         svt    Bone cancer Father     No Known Problems Maternal Grandfather     Heart murmur Cousin     Congenital heart disease Cousin         unknown defect    Heart murmur Other     Pacemaker/defibrilator Neg Hx     Early death Neg Hx        Social History     Socioeconomic History    Marital status: Single     Spouse name: Not on file    Number of children: Not on file    Years of education: Not on file    Highest education level: Not on file   Occupational History    Not on file   Social Needs    Financial resource strain: Not on file    Food insecurity:     Worry: Not on file     Inability: Not on file    Transportation needs:     Medical: Not on file     Non-medical: Not on file   Tobacco Use    Smoking status: Passive Smoke Exposure - Never Smoker    Smokeless tobacco: Never Used   Substance and Sexual Activity    Alcohol use: Never     Frequency: Never    Drug use: Never    Sexual activity: Never   Lifestyle    Physical activity:     Days per week: Not on file     Minutes per session: Not on file    Stress: Not on file   Relationships    Social connections:     Talks on phone: Not on file     Gets together: Not on file     Attends [employer] service: Not on file     Active member of club or organization: Not on file     Attends meetings of clubs or organizations: Not on file     Relationship status: Not on file   Other Topics Concern    Not on file   Social History Narrative    Lives with grandmother, in first grade.        Allergies:  Milk containing products    Medications:    Current Outpatient Medications:     AFLURIA QUAD 2019-20,6MO UP, 60 mcg (15 mcg x 4)/0.5 mL Susp, INTO MUSCLE, Disp: , Rfl: 0    betamethasone valerate 0.1% (VALISONE) 0.1 % Crea, Apply topically 2 (two) times daily., Disp: 45 g, Rfl: 0    cloNIDine (CATAPRES) 0.1 MG tablet, Take 1 tablet (0.1 mg total) by mouth every evening., Disp: 30 tablet, Rfl: 1    cloNIDine  (CATAPRES) 0.2 MG tablet, GIVE ONE TABLET BY MOUTH EVERY NIGHT AT BEDTIME THANK YOU, Disp: , Rfl: 0    dextroamphetamine-amphetamine (ADDERALL XR) 10 MG 24 hr capsule, Take by mouth every morning., Disp: , Rfl: 0    fluticasone (FLONASE) 50 mcg/actuation nasal spray, 1 spray (50 mcg total) by Each Nare route once daily., Disp: 1 Bottle, Rfl: 3    polyethylene glycol (GLYCOLAX) 17 gram/dose powder, Take 17g (one capful) in 6 oz water about every 2 hours until stool, then daily/as needed for constipation, Disp: 527 g, Rfl: 0    albuterol (VENTOLIN HFA) 90 mcg/actuation inhaler, Inhale 2 puffs into the lungs every 4 (four) hours as needed for Wheezing or Shortness of Breath (cough). Rescue (Patient not taking: Reported on 12/9/2019), Disp: 18 g, Rfl: 0    albuterol (VENTOLIN HFA) 90 mcg/actuation inhaler, Inhale 2 puffs into the lungs every 6 (six) hours as needed for Wheezing. Rescue (Patient not taking: Reported on 12/9/2019), Disp: 1 Inhaler, Rfl: 0    cetirizine (ZYRTEC) 1 mg/mL syrup, Take 5 mLs (5 mg total) by mouth once daily. (Patient not taking: Reported on 12/9/2019), Disp: 473 mL, Rfl: 0    dextroamphetamine-amphetamine 5 mg Tab, Take 5 mg by mouth once daily. Take 1/2 PO AM  Take 1/2 PO at 4PM, Disp: , Rfl:     inhalation spacing device (BREATHERITE SPACER-MASK,CHILD), Use as directed for inhalation. Please dispense any spacer covered by patient's insurance. (Patient not taking: Reported on 12/9/2019), Disp: 1 Device, Rfl: 1    inhalation spacing device, Use with MDI as directed for inhalation for school use (Patient not taking: Reported on 12/9/2019), Disp: 1 Device, Rfl: 0    mupirocin (BACTROBAN) 2 % ointment, Apply to affected area 3 times daily for 7 days (Patient not taking: Reported on 12/9/2019), Disp: 30 g, Rfl: 0    PHYSICAL EXAMINATION:    Constitutional: He appears well-developed and well-nourished.  He is in no apparent distress.    Neck: Normal ROM.     Cardiovascular: Regular  rhythm.      Pulmonary/Chest: Effort normal. No respiratory distress.     Abdominal:  He exhibits no distension.  There is no CVA tenderness.     Lymphadenopathy:        Right: No supraclavicular adenopathy present.        Left: No supraclavicular adenopathy present.     Neurological: He is alert, active and playful.     Skin: Skin is warm and dry.     Extremities: Normal ROM    Psych: Cooperative with normal behavior.    Genitourinary: The penis is circumcised. Urethral meatus normal. The testes, epididymides, and cord structures are normal in size and contour bilaterally. The scrotum is normal in size and contour. Bilateral testes are descended.        Physical Exam      LABS:    U/a: sp grav 1.010, pH 6, negative      IMPRESSION:    Encounter Diagnoses   Name Primary?    Dysuria Yes    Nocturnal enuresis     Constipation, unspecified constipation type          PLAN:  -Dysuria:  Need to start timed voiding every 2 hours regardless of urge. Try to avoid holding.  Avoid bladder irritants: caffeine, carbonation, citrus, and red dye  Fluid intake of at least 1-1.5 L per day with 50% in water intake  Consume majority of fluids during morning and afternoon hours, reducing intake in the evening. Nothing to eat/drink 2 hours before bed.  Avoid constipation. KUB reviewed today in clinic with grandmother and patient.  Continue miralax 1 capful in 10 oz liquid daily  Increase fiber/water intake  F/U with GI for constipation    Will consult with Dr. Moses regarding dysuria and call mom once I speak with him.    -Continue dietary and behavior modifications for bedwetting    -RTC 4-6 weeks      I spent 25 minutes with the patient of which more than half was spent in coordinating the patient's care as well as in direct consultation with the patient in regards to our treatment and plan.   No

## 2020-09-03 NOTE — CONSULT NOTE ADULT - ASSESSMENT
80F with HTN, folate deficiency anemia, GERD, LBP, pressure ulcers, OA, ulcerative colitis, hypothyroidism is sent to ED from [facility]  with c/o right eye swelling. ICU was consulted given ABG findings 80F with HTN, folate deficiency anemia, GERD, LBP, pressure ulcers, OA, ulcerative colitis, hypothyroidism is sent to ED from [facility]  with c/o right eye swelling. ICU was consulted given ABG findings of Hypercarbia.     Assessment/Plan:    1.Acute on Chronic Hypercapnia  secondary to hyperoxia:   -Patient's ABG showed: 7.24/118/174/49- on 2L NC Acute on Chronic Respiratory acidosis with metabolic compensation probably worsened with hyperoxia   -Initially on Bi-PAP, switched to 1L NC   -Recommend to keep her SPO2 around 92%, avoid over oxygenating as it may suppress respiratory drive.   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 No fever, pain or discharge or pain with EOM movement   -Start  ciprofloxacin drops   -Needs Opthalmology referral       Please re-consult if needed.

## 2020-09-03 NOTE — CONSULT NOTE ADULT - SUBJECTIVE AND OBJECTIVE BOX
Patient is a 80y old  Female who presents with a chief complaint of     HPI: 80F with HTN, folate deficiency anemia, GERD, LBP, pressure ulcers, OA, ulcerative colitis, hypothyroidism       Allergies    iodine containing compounds (Unknown)  Milk (Unknown)  morphine (Unknown)  Orange Juice (Unknown)  Rice (Unknown)    Intolerances        MEDICATIONS  (STANDING):  ALBUTerol    90 MICROgram(s) HFA Inhaler 1 Puff(s) Inhalation every 4 hours  albuterol/ipratropium for Nebulization. 6 milliLiter(s) Nebulizer once    MEDICATIONS  (PRN):      Daily     Daily     Drug Dosing Weight  Height (cm): 162.56 (16 Jun 2020 13:54)  Weight (kg): 77.7 (03 Sep 2020 15:15)  BMI (kg/m2): 29.4 (03 Sep 2020 15:15)  BSA (m2): 1.83 (03 Sep 2020 15:15)    PAST MEDICAL & SURGICAL HISTORY:  Pressure ulcer  Pain, low back  Ulcerative colitis  Osteoarthritis  Hypothyroidism  GERD (gastroesophageal reflux disease)  Hypertension  History of ankle surgery  H/O: hysterectomy      FAMILY HISTORY:  No pertinent family history in first degree relatives      SOCIAL HISTORY:    ADVANCE DIRECTIVES:    REVIEW OF SYSTEMS:    CONSTITUTIONAL: No fever, weight loss, or fatigue  EYES: No eye pain, visual disturbances, or discharge  ENMT:  No difficulty hearing, tinnitus, vertigo; No sinus or throat pain  NECK: No pain or stiffness  BREASTS: No pain, masses, or nipple discharge  RESPIRATORY: No cough, wheezing, chills or hemoptysis; No shortness of breath  CARDIOVASCULAR: No chest pain, palpitations, dizziness, or leg swelling  GASTROINTESTINAL: No abdominal or epigastric pain. No nausea, vomiting, or hematemesis; No diarrhea or constipation. No melena or hematochezia.  GENITOURINARY: No dysuria, frequency, hematuria, or incontinence  NEUROLOGICAL: No headaches, memory loss, loss of strength, numbness, or tremors  SKIN: No itching, burning, rashes, or lesions   LYMPH NODES: No enlarged glands  ENDOCRINE: No heat or cold intolerance; No hair loss  MUSCULOSKELETAL: No joint pain or swelling; No muscle, back, or extremity pain  PSYCHIATRIC: No depression, anxiety, mood swings, or difficulty sleeping  HEME/LYMPH: No easy bruising, or bleeding gums  ALLERGY AND IMMUNOLOGIC: No hives or eczema          ICU Vital Signs Last 24 Hrs  T(C): 36.7 (03 Sep 2020 17:17), Max: 36.7 (03 Sep 2020 17:17)  T(F): 98 (03 Sep 2020 17:17), Max: 98 (03 Sep 2020 17:17)  HR: 85 (03 Sep 2020 18:11) (81 - 85)  BP: 122/81 (03 Sep 2020 17:17) (122/81 - 127/85)  BP(mean): --  ABP: --  ABP(mean): --  RR: 20 (03 Sep 2020 17:17) (20 - 20)  SpO2: 100% (03 Sep 2020 18:11) (98% - 100%)      ABG - ( 03 Sep 2020 17:32 )  pH, Arterial: 7.24  pH, Blood: x     /  pCO2: 118   /  pO2: 174   / HCO3: 49    / Base Excess: 17.8  /  SaO2: 100                 I&O's Detail      PHYSICAL EXAM:    GENERAL: NAD, well-groomed, well-developed  HEAD:  Atraumatic, Normocephalic  EYES: EOMI, PERRLA, conjunctiva and sclera clear  ENMT: No tonsillar erythema, exudates, or enlargement; Moist mucous membranes, Good dentition, No lesions  NECK: Supple, No JVD, Normal thyroid  NERVOUS SYSTEM:  Alert & Oriented X3, Good concentration; Motor Strength 5/5 B/L upper and lower extremities; DTRs 2+ intact and symmetric  CHEST/LUNG: Clear to percussion bilaterally; No rales, rhonchi, wheezing, or rubs  HEART: Regular rate and rhythm; No murmurs, rubs, or gallops  ABDOMEN: Soft, Nontender, Nondistended; Bowel sounds present  EXTREMITIES:  2+ Peripheral Pulses, No clubbing, cyanosis, or edema  LYMPH: No lymphadenopathy noted  SKIN: No rashes or lesions    LABS:  CBC Full  -  ( 03 Sep 2020 16:03 )  WBC Count : 8.66 K/uL  RBC Count : 3.51 M/uL  Hemoglobin : 10.5 g/dL  Hematocrit : 38.6 %  Platelet Count - Automated : 193 K/uL  Mean Cell Volume : 110.0 fl  Mean Cell Hemoglobin : 29.9 pg  Mean Cell Hemoglobin Concentration : 27.2 gm/dL  Auto Neutrophil # : 5.36 K/uL  Auto Lymphocyte # : 2.03 K/uL  Auto Monocyte # : 0.97 K/uL  Auto Eosinophil # : 0.18 K/uL  Auto Basophil # : 0.04 K/uL  Auto Neutrophil % : 61.9 %  Auto Lymphocyte % : 23.4 %  Auto Monocyte % : 11.2 %  Auto Eosinophil % : 2.1 %  Auto Basophil % : 0.5 %    09-03    144  |  98  |  31<H>  ----------------------------<  139<H>  4.4   |  58<HH>  |  1.33<H>    Ca    8.3<L>      03 Sep 2020 16:03    TPro  7.0  /  Alb  3.1<L>  /  TBili  0.3  /  DBili  x   /  AST  19  /  ALT  16  /  AlkPhos  62  09-03    CAPILLARY BLOOD GLUCOSE                    EKG:    ECHO, US:    RADIOLOGY:    CRITICAL CARE TIME SPENT: Patient is a 80y old  Female who presents with a chief complaint of     HPI: 80F with HTN, folate deficiency anemia, GERD, LBP, pressure ulcers, OA, ulcerative colitis, hypothyroidism is sent to ED from [facility]  with c/o right eye swelling. Patient reports that her right eye selling has worsened from before. She denies any eye pain, purulent  discharge, vision changes or pain with eye movement She reports that she has had this swelling before and was told to wear an eye patch which helped but she did not wear it this time. She denies fever, chest pain, nausea, vomiting, shortness of breath, headache or other symptoms.     ED Course: In ED, patient's VS showed: temp 98F, HR 81, /81,  RR 20, SPO2  98% on 2L NC   Her lab work showed: H/H 10.5/38.6, CO2 58, AG:-12, BUN/Cr 31/1.33   ABG showed: 7.24/118/174/49 on 2L NC     She was initially placed on Bi-PAP which was later tapered off to 1L NC. She is awake and oriented.     Allergies    iodine containing compounds (Unknown)  Milk (Unknown)  morphine (Unknown)  Orange Juice (Unknown)  Rice (Unknown)    Intolerances        MEDICATIONS  (STANDING):  ALBUTerol    90 MICROgram(s) HFA Inhaler 1 Puff(s) Inhalation every 4 hours  albuterol/ipratropium for Nebulization. 6 milliLiter(s) Nebulizer once    MEDICATIONS  (PRN):      Daily     Daily     Drug Dosing Weight  Height (cm): 162.56 (16 Jun 2020 13:54)  Weight (kg): 77.7 (03 Sep 2020 15:15)  BMI (kg/m2): 29.4 (03 Sep 2020 15:15)  BSA (m2): 1.83 (03 Sep 2020 15:15)    PAST MEDICAL & SURGICAL HISTORY:  Pressure ulcer  Pain, low back  Ulcerative colitis  Osteoarthritis  Hypothyroidism  GERD (gastroesophageal reflux disease)  Hypertension  History of ankle surgery  H/O: hysterectomy      FAMILY HISTORY:  No pertinent family history in first degree relatives      SOCIAL HISTORY: Patient reports being an ex-smoker and quit almost a year ago.     ADVANCE DIRECTIVES: DNR/DNI/ No feeding tubes     REVIEW OF SYSTEMS:    CONSTITUTIONAL: No fever, weight loss, or fatigue  EYES: No eye pain, visual disturbances, or discharge, eye swelling   ENMT:  No difficulty hearing, tinnitus, vertigo; No sinus or throat pain  NECK: No pain or stiffness  BREASTS: No pain, masses, or nipple discharge  RESPIRATORY: No cough, wheezing, chills or hemoptysis; No shortness of breath  CARDIOVASCULAR: No chest pain, palpitations, dizziness, or leg swelling  GASTROINTESTINAL: No abdominal or epigastric pain. No nausea, vomiting, or hematemesis; No  constipation. No melena or hematochezia.  NEUROLOGICAL: No headaches, memory loss, loss of strength, numbness, or tremors  MUSCULOSKELETAL: reports back pain and knee pain.   ALLERGY AND IMMUNOLOGIC: No hives or eczema          ICU Vital Signs Last 24 Hrs  T(C): 36.7 (03 Sep 2020 17:17), Max: 36.7 (03 Sep 2020 17:17)  T(F): 98 (03 Sep 2020 17:17), Max: 98 (03 Sep 2020 17:17)  HR: 85 (03 Sep 2020 18:11) (81 - 85)  BP: 122/81 (03 Sep 2020 17:17) (122/81 - 127/85)  BP(mean): --  ABP: --  ABP(mean): --  RR: 20 (03 Sep 2020 17:17) (20 - 20)  SpO2: 100% (03 Sep 2020 18:11) (98% - 100%)      ABG - ( 03 Sep 2020 17:32 )  pH, Arterial: 7.24  pH, Blood: x     /  pCO2: 118   /  pO2: 174   / HCO3: 49    / Base Excess: 17.8  /  SaO2: 100                 I&O's Detail      PHYSICAL EXAM:    GENERAL: obese elderly female sitting in bed   HEAD:  Atraumatic, Normocephalic  EYES: EOMI, eye swelling R>L, with creamy discharge ointment applied)   ENMT: No tonsillar erythema, exudates, or enlargement; Moist mucous membranes, Good dentition, No lesions  NECK: Supple, No JVD, Normal thyroid  NERVOUS SYSTEM:  Alert & Oriented X3, Good concentration;   CHEST/LUNG: Decreased breath sounds,  No rales, rhonchi, wheezing, or rubs  HEART: Regular rate and rhythm; No murmurs, rubs, or gallops  ABDOMEN: Soft, Nontender, Nondistended; Bowel sounds present  EXTREMITIES:  2+ pitting edema up to calves   SKIN: No rashes or lesions    LABS:  CBC Full  -  ( 03 Sep 2020 16:03 )  WBC Count : 8.66 K/uL  RBC Count : 3.51 M/uL  Hemoglobin : 10.5 g/dL  Hematocrit : 38.6 %  Platelet Count - Automated : 193 K/uL  Mean Cell Volume : 110.0 fl  Mean Cell Hemoglobin : 29.9 pg  Mean Cell Hemoglobin Concentration : 27.2 gm/dL  Auto Neutrophil # : 5.36 K/uL  Auto Lymphocyte # : 2.03 K/uL  Auto Monocyte # : 0.97 K/uL  Auto Eosinophil # : 0.18 K/uL  Auto Basophil # : 0.04 K/uL  Auto Neutrophil % : 61.9 %  Auto Lymphocyte % : 23.4 %  Auto Monocyte % : 11.2 %  Auto Eosinophil % : 2.1 %  Auto Basophil % : 0.5 %    09-03    144  |  98  |  31<H>  ----------------------------<  139<H>  4.4   |  58<HH>  |  1.33<H>    Ca    8.3<L>      03 Sep 2020 16:03    TPro  7.0  /  Alb  3.1<L>  /  TBili  0.3  /  DBili  x   /  AST  19  /  ALT  16  /  AlkPhos  62  09-03    CAPILLARY BLOOD GLUCOSE          EKG:      CRITICAL CARE TIME SPENT: 30 minutes

## 2020-09-03 NOTE — ED PROVIDER NOTE - OBJECTIVE STATEMENT
80 y.o presenting for facial swelling, predominately in her eyelids. patient endorses that she had it intermittently x weeks, has had similar reaction in the past 2/2 allergic reaction. patient unsure what exposure she had. denies sob, n, v, tongue swelling, cp, rash.

## 2020-09-03 NOTE — ED ADULT NURSE NOTE - CHIEF COMPLAINT QUOTE
Cardiac Rehabilitation Plan of Care   90 day       Today's date: 2019   Visits: 26  Patient name: Ellis No      : 1951  Age: 76 y o  MRN: [id number]  Referring Physician: Jennifer Lara PA-C  Provider: Robyn Fontaine  Clinician: Codie Rodriguez Rn    Dx:   Encounter Diagnoses   Name Primary?  S/P AVR     S/P MVR (mitral valve repair)      Date of onset: 3/7/2019      SUMMARY OF PROGRESS:  Mr Won Hickey completed 26 sessions of the cardiac rehabilitation program  His telemetry monitor has been NSR with 1AVB PVC at rest and with exercise since his cardioversion on 2019  His exercise MET level increased from 3 8 to 4 6 MET's  He completes 41 minutes of cardiovascular exercise  He stated he noticed an increase in energy since he started the cardiac rehabilitation program  Will continue to monitor  Medication compliance: Yes   Comments: stated compliant with medications  Fall Risk: Moderate   Comments: due to fatigue and elevated heart rate    EKG changes: sinus tachycardia      EXERCISE ASSESSMENT and PLAN    Current Exercise Program in Rehab:       Frequency:  3 days/week        Minutes: 41         METS: 4 6            HR: 86   RPE: 4-5         Modalities: Treadmill, NuStep and Recumbent bike      Exercise Progression 30 Day Goals :    Frequency: 3days/week   Minutes: 50-55   METS: 3 8-5   HR:  20-30 bpm above resting   RPE: 4-6   Modalities: Treadmill, Eliptical , NuStep and Recumbent bike    Strength training: Will be added following at least 8 weeks post surgery and 8-10 monitored sessions       Progressing:   In Progress    Home Exercise: none at this time    Goals: increase endurance to return to walking 2 miles a day in 10 to 12 weeks, increase endurance to return to work post cardiac rehabilitation   Education: signs and sxs and RPE scale   Plan:will provide home exercise program  Readiness to change: Preparation      NUTRITION ASSESSMENT AND PLAN    Weight control:    Starting Swelling to face/eyes on and off x few weeks. No SOB noted Sent by [facility] weight: 155 8   Current weight:     Waist circumference:    Starting:    Current:    Diabetes: Patient reported fasting   Lipid management: Discussed diet and lipid management and Last lipid profile 81  Chol 81  TRG 63  HDL 40  LDL 28  Goals:continue to consume a heart healthy diet score 65 on rate your plate  Education: heart healthy eating  Progressing: In Progress  Plan: continue to consume a heart healthy diet   Readiness to change: Action      PSYCHOSOCIAL ASSESSMENT AND PLAN    Emotional:              1-4 = Minimal Depression  Self-reported stress level: 2   Social support: Very Good  Goals:  PHQ-9 - reduced severity by one level  Education: benefits of positive support system and coping mechanisms, stress and relaxation techniques  Progressing: In Progress  Plan: Practice relaxation techniques  Readiness to change: Preparation      OTHER CORE COMPONENTS     Tobacco:   Social History     Tobacco Use   Smoking Status Former Smoker    Last attempt to quit: Vik Wong Years since quittin 5   Smokeless Tobacco Never Used       Tobacco Use Intervention: Referral to tobacco expert:   N/A    Blood pressure:    Restin/70   Exercise: 144/66  Goals: set a target quit date and continue to take medication as prescibed  Education:  Understanding heart disease, HTN and CAD and low sodium diet and HTN, common heart medications  Progressing: In Progress  Plan: continue to take medication as prescibed and consume a heart healthy diet  Readiness to change: Action

## 2020-09-04 DIAGNOSIS — J96.00 ACUTE RESPIRATORY FAILURE, UNSPECIFIED WHETHER WITH HYPOXIA OR HYPERCAPNIA: ICD-10-CM

## 2020-09-04 DIAGNOSIS — D64.9 ANEMIA, UNSPECIFIED: ICD-10-CM

## 2020-09-04 DIAGNOSIS — Z29.9 ENCOUNTER FOR PROPHYLACTIC MEASURES, UNSPECIFIED: ICD-10-CM

## 2020-09-04 DIAGNOSIS — E03.9 HYPOTHYROIDISM, UNSPECIFIED: ICD-10-CM

## 2020-09-04 DIAGNOSIS — M19.90 UNSPECIFIED OSTEOARTHRITIS, UNSPECIFIED SITE: ICD-10-CM

## 2020-09-04 DIAGNOSIS — N17.9 ACUTE KIDNEY FAILURE, UNSPECIFIED: ICD-10-CM

## 2020-09-04 DIAGNOSIS — R53.81 OTHER MALAISE: ICD-10-CM

## 2020-09-04 DIAGNOSIS — Z71.89 OTHER SPECIFIED COUNSELING: ICD-10-CM

## 2020-09-04 DIAGNOSIS — H57.89 OTHER SPECIFIED DISORDERS OF EYE AND ADNEXA: ICD-10-CM

## 2020-09-04 DIAGNOSIS — I10 ESSENTIAL (PRIMARY) HYPERTENSION: ICD-10-CM

## 2020-09-04 DIAGNOSIS — J44.1 CHRONIC OBSTRUCTIVE PULMONARY DISEASE WITH (ACUTE) EXACERBATION: ICD-10-CM

## 2020-09-04 LAB
A1C WITH ESTIMATED AVERAGE GLUCOSE RESULT: 4.7 % — SIGNIFICANT CHANGE UP (ref 4–5.6)
ALBUMIN SERPL ELPH-MCNC: 3.4 G/DL — LOW (ref 3.5–5)
ALP SERPL-CCNC: 74 U/L — SIGNIFICANT CHANGE UP (ref 40–120)
ALT FLD-CCNC: 16 U/L DA — SIGNIFICANT CHANGE UP (ref 10–60)
ANION GAP SERPL CALC-SCNC: -13 MMOL/L — LOW (ref 5–17)
AST SERPL-CCNC: 16 U/L — SIGNIFICANT CHANGE UP (ref 10–40)
BASE EXCESS BLDA CALC-SCNC: 16.5 MMOL/L — HIGH (ref -2–2)
BASE EXCESS BLDA CALC-SCNC: 17.1 MMOL/L — HIGH (ref -2–2)
BASOPHILS # BLD AUTO: 0.01 K/UL — SIGNIFICANT CHANGE UP (ref 0–0.2)
BASOPHILS NFR BLD AUTO: 0.1 % — SIGNIFICANT CHANGE UP (ref 0–2)
BILIRUB SERPL-MCNC: 0.5 MG/DL — SIGNIFICANT CHANGE UP (ref 0.2–1.2)
BLD GP AB SCN SERPL QL: SIGNIFICANT CHANGE UP
BLOOD GAS COMMENTS ARTERIAL: SIGNIFICANT CHANGE UP
BUN SERPL-MCNC: 29 MG/DL — HIGH (ref 7–18)
BUN SERPL-MCNC: 31 MG/DL — HIGH (ref 7–18)
CALCIUM SERPL-MCNC: 8.7 MG/DL — SIGNIFICANT CHANGE UP (ref 8.4–10.5)
CHLORIDE SERPL-SCNC: 94 MMOL/L — LOW (ref 96–108)
CHOLEST SERPL-MCNC: 290 MG/DL — HIGH (ref 10–199)
CO2 SERPL-SCNC: 44 MMOL/L — SIGNIFICANT CHANGE UP (ref 22–31)
CO2 SERPL-SCNC: 61 MMOL/L — CRITICAL HIGH (ref 22–31)
CREAT SERPL-MCNC: 1.09 MG/DL — SIGNIFICANT CHANGE UP (ref 0.5–1.3)
CREAT SERPL-MCNC: 1.14 MG/DL — SIGNIFICANT CHANGE UP (ref 0.5–1.3)
EOSINOPHIL # BLD AUTO: 0 K/UL — SIGNIFICANT CHANGE UP (ref 0–0.5)
EOSINOPHIL NFR BLD AUTO: 0 % — SIGNIFICANT CHANGE UP (ref 0–6)
ESTIMATED AVERAGE GLUCOSE: 88 MG/DL — SIGNIFICANT CHANGE UP (ref 68–114)
FOLATE SERPL-MCNC: >20 NG/ML — SIGNIFICANT CHANGE UP
GLUCOSE SERPL-MCNC: 120 MG/DL — HIGH (ref 70–99)
GLUCOSE SERPL-MCNC: 137 MG/DL — HIGH (ref 70–99)
HCO3 BLDA-SCNC: 48 MMOL/L — HIGH (ref 23–27)
HCT VFR BLD CALC: 39.4 % — SIGNIFICANT CHANGE UP (ref 34.5–45)
HCT VFR BLD CALC: 41.7 % — SIGNIFICANT CHANGE UP (ref 34.5–45)
HDLC SERPL-MCNC: 91 MG/DL — SIGNIFICANT CHANGE UP
HGB BLD-MCNC: 10.9 G/DL — LOW (ref 11.5–15.5)
HGB BLD-MCNC: 11.6 G/DL — SIGNIFICANT CHANGE UP (ref 11.5–15.5)
HOROWITZ INDEX BLDA+IHG-RTO: 28 — SIGNIFICANT CHANGE UP
HOROWITZ INDEX BLDA+IHG-RTO: 32 — SIGNIFICANT CHANGE UP
IMM GRANULOCYTES NFR BLD AUTO: 1.8 % — HIGH (ref 0–1.5)
INR BLD: 1.07 RATIO — SIGNIFICANT CHANGE UP (ref 0.88–1.16)
INR BLD: 1.13 RATIO — SIGNIFICANT CHANGE UP (ref 0.88–1.16)
LIPID PNL WITH DIRECT LDL SERPL: 176 MG/DL — SIGNIFICANT CHANGE UP
LYMPHOCYTES # BLD AUTO: 0.65 K/UL — LOW (ref 1–3.3)
LYMPHOCYTES # BLD AUTO: 8.6 % — LOW (ref 13–44)
MAGNESIUM SERPL-MCNC: 2.4 MG/DL — SIGNIFICANT CHANGE UP (ref 1.6–2.6)
MCHC RBC-ENTMCNC: 27.7 GM/DL — LOW (ref 32–36)
MCHC RBC-ENTMCNC: 27.8 GM/DL — LOW (ref 32–36)
MCHC RBC-ENTMCNC: 29.9 PG — SIGNIFICANT CHANGE UP (ref 27–34)
MCV RBC AUTO: 107.5 FL — HIGH (ref 80–100)
MCV RBC AUTO: 108.2 FL — HIGH (ref 80–100)
MONOCYTES # BLD AUTO: 0.2 K/UL — SIGNIFICANT CHANGE UP (ref 0–0.9)
MONOCYTES NFR BLD AUTO: 2.6 % — SIGNIFICANT CHANGE UP (ref 2–14)
NEUTROPHILS # BLD AUTO: 6.58 K/UL — SIGNIFICANT CHANGE UP (ref 1.8–7.4)
NEUTROPHILS NFR BLD AUTO: 86.9 % — HIGH (ref 43–77)
NRBC # BLD: 0 /100 WBCS — SIGNIFICANT CHANGE UP (ref 0–0)
NT-PROBNP SERPL-SCNC: 2025 PG/ML — HIGH (ref 0–450)
PCO2 BLDA: 116 MMHG — CRITICAL HIGH (ref 32–46)
PCO2 BLDA: 133 MMHG — CRITICAL HIGH (ref 32–46)
PH BLDA: 7.18 — CRITICAL LOW (ref 7.35–7.45)
PH BLDA: 7.24 — LOW (ref 7.35–7.45)
PHOSPHATE SERPL-MCNC: 3.6 MG/DL — SIGNIFICANT CHANGE UP (ref 2.5–4.5)
PLATELET # BLD AUTO: 198 K/UL — SIGNIFICANT CHANGE UP (ref 150–400)
PLATELET # BLD AUTO: 218 K/UL — SIGNIFICANT CHANGE UP (ref 150–400)
PO2 BLDA: 113 MMHG — HIGH (ref 74–108)
PO2 BLDA: 131 MMHG — HIGH (ref 74–108)
POTASSIUM SERPL-MCNC: 4.4 MMOL/L — SIGNIFICANT CHANGE UP (ref 3.5–5.3)
POTASSIUM SERPL-MCNC: 4.6 MMOL/L — SIGNIFICANT CHANGE UP (ref 3.5–5.3)
POTASSIUM SERPL-SCNC: 4.4 MMOL/L — SIGNIFICANT CHANGE UP (ref 3.5–5.3)
POTASSIUM SERPL-SCNC: 4.6 MMOL/L — SIGNIFICANT CHANGE UP (ref 3.5–5.3)
PROT SERPL-MCNC: 7.6 G/DL — SIGNIFICANT CHANGE UP (ref 6–8.3)
PROTHROM AB SERPL-ACNC: 12.5 SEC — SIGNIFICANT CHANGE UP (ref 10.6–13.6)
PROTHROM AB SERPL-ACNC: 13.1 SEC — SIGNIFICANT CHANGE UP (ref 10.6–13.6)
RBC # BLD: 3.64 M/UL — LOW (ref 3.8–5.2)
RBC # BLD: 3.88 M/UL — SIGNIFICANT CHANGE UP (ref 3.8–5.2)
RBC # FLD: 13.3 % — SIGNIFICANT CHANGE UP (ref 10.3–14.5)
RBC # FLD: 13.6 % — SIGNIFICANT CHANGE UP (ref 10.3–14.5)
SAO2 % BLDA: 98 % — HIGH (ref 92–96)
SODIUM SERPL-SCNC: 142 MMOL/L — SIGNIFICANT CHANGE UP (ref 135–145)
TOTAL CHOLESTEROL/HDL RATIO MEASUREMENT: 3.2 RATIO — LOW (ref 3.3–7.1)
TRIGL SERPL-MCNC: 114 MG/DL — SIGNIFICANT CHANGE UP (ref 10–149)
TSH SERPL-MCNC: 0.7 UU/ML — SIGNIFICANT CHANGE UP (ref 0.34–4.82)
VIT B12 SERPL-MCNC: 616 PG/ML — SIGNIFICANT CHANGE UP (ref 232–1245)
WBC # BLD: 6.06 K/UL — SIGNIFICANT CHANGE UP (ref 3.8–10.5)
WBC # BLD: 7.58 K/UL — SIGNIFICANT CHANGE UP (ref 3.8–10.5)
WBC # FLD AUTO: 6.06 K/UL — SIGNIFICANT CHANGE UP (ref 3.8–10.5)
WBC # FLD AUTO: 7.58 K/UL — SIGNIFICANT CHANGE UP (ref 3.8–10.5)

## 2020-09-04 PROCEDURE — 93970 EXTREMITY STUDY: CPT | Mod: 26

## 2020-09-04 RX ORDER — FUROSEMIDE 40 MG
40 TABLET ORAL ONCE
Refills: 0 | Status: COMPLETED | OUTPATIENT
Start: 2020-09-04 | End: 2020-09-04

## 2020-09-04 RX ORDER — PANTOPRAZOLE SODIUM 20 MG/1
40 TABLET, DELAYED RELEASE ORAL DAILY
Refills: 0 | Status: DISCONTINUED | OUTPATIENT
Start: 2020-09-04 | End: 2020-09-08

## 2020-09-04 RX ORDER — ASPIRIN/CALCIUM CARB/MAGNESIUM 324 MG
81 TABLET ORAL DAILY
Refills: 0 | Status: DISCONTINUED | OUTPATIENT
Start: 2020-09-04 | End: 2020-09-08

## 2020-09-04 RX ORDER — CHLORHEXIDINE GLUCONATE 213 G/1000ML
1 SOLUTION TOPICAL
Refills: 0 | Status: DISCONTINUED | OUTPATIENT
Start: 2020-09-04 | End: 2020-09-08

## 2020-09-04 RX ORDER — HEPARIN SODIUM 5000 [USP'U]/ML
5000 INJECTION INTRAVENOUS; SUBCUTANEOUS EVERY 12 HOURS
Refills: 0 | Status: DISCONTINUED | OUTPATIENT
Start: 2020-09-04 | End: 2020-09-04

## 2020-09-04 RX ORDER — LEVOTHYROXINE SODIUM 125 MCG
100 TABLET ORAL DAILY
Refills: 0 | Status: DISCONTINUED | OUTPATIENT
Start: 2020-09-04 | End: 2020-09-08

## 2020-09-04 RX ORDER — DIPHENHYDRAMINE HCL 50 MG
50 CAPSULE ORAL AT BEDTIME
Refills: 0 | Status: DISCONTINUED | OUTPATIENT
Start: 2020-09-04 | End: 2020-09-04

## 2020-09-04 RX ORDER — FUROSEMIDE 40 MG
40 TABLET ORAL DAILY
Refills: 0 | Status: DISCONTINUED | OUTPATIENT
Start: 2020-09-04 | End: 2020-09-04

## 2020-09-04 RX ORDER — CIPROFLOXACIN HCL 0.3 %
2 DROPS OPHTHALMIC (EYE)
Refills: 0 | Status: DISCONTINUED | OUTPATIENT
Start: 2020-09-04 | End: 2020-09-08

## 2020-09-04 RX ORDER — ATORVASTATIN CALCIUM 80 MG/1
40 TABLET, FILM COATED ORAL AT BEDTIME
Refills: 0 | Status: DISCONTINUED | OUTPATIENT
Start: 2020-09-04 | End: 2020-09-08

## 2020-09-04 RX ORDER — MULTIVIT-MIN/FERROUS GLUCONATE 9 MG/15 ML
1 LIQUID (ML) ORAL DAILY
Refills: 0 | Status: DISCONTINUED | OUTPATIENT
Start: 2020-09-04 | End: 2020-09-04

## 2020-09-04 RX ORDER — INFLUENZA VIRUS VACCINE 15; 15; 15; 15 UG/.5ML; UG/.5ML; UG/.5ML; UG/.5ML
0.5 SUSPENSION INTRAMUSCULAR ONCE
Refills: 0 | Status: COMPLETED | OUTPATIENT
Start: 2020-09-04 | End: 2020-09-04

## 2020-09-04 RX ORDER — PANTOPRAZOLE SODIUM 20 MG/1
40 TABLET, DELAYED RELEASE ORAL
Refills: 0 | Status: DISCONTINUED | OUTPATIENT
Start: 2020-09-04 | End: 2020-09-04

## 2020-09-04 RX ORDER — ERYTHROMYCIN BASE 5 MG/GRAM
1 OINTMENT (GRAM) OPHTHALMIC (EYE)
Refills: 0 | Status: DISCONTINUED | OUTPATIENT
Start: 2020-09-04 | End: 2020-09-04

## 2020-09-04 RX ORDER — HYDROCORTISONE 1 %
1 OINTMENT (GRAM) TOPICAL AT BEDTIME
Refills: 0 | Status: DISCONTINUED | OUTPATIENT
Start: 2020-09-04 | End: 2020-09-08

## 2020-09-04 RX ORDER — FUROSEMIDE 40 MG
40 TABLET ORAL DAILY
Refills: 0 | Status: DISCONTINUED | OUTPATIENT
Start: 2020-09-05 | End: 2020-09-08

## 2020-09-04 RX ORDER — DIPHENHYDRAMINE HCL 50 MG
25 CAPSULE ORAL AT BEDTIME
Refills: 0 | Status: DISCONTINUED | OUTPATIENT
Start: 2020-09-04 | End: 2020-09-05

## 2020-09-04 RX ORDER — FOLIC ACID 0.8 MG
1 TABLET ORAL DAILY
Refills: 0 | Status: DISCONTINUED | OUTPATIENT
Start: 2020-09-04 | End: 2020-09-08

## 2020-09-04 RX ORDER — SENNA PLUS 8.6 MG/1
2 TABLET ORAL AT BEDTIME
Refills: 0 | Status: DISCONTINUED | OUTPATIENT
Start: 2020-09-04 | End: 2020-09-08

## 2020-09-04 RX ORDER — HEPARIN SODIUM 5000 [USP'U]/ML
5000 INJECTION INTRAVENOUS; SUBCUTANEOUS EVERY 8 HOURS
Refills: 0 | Status: DISCONTINUED | OUTPATIENT
Start: 2020-09-04 | End: 2020-09-08

## 2020-09-04 RX ORDER — AMLODIPINE BESYLATE 2.5 MG/1
2.5 TABLET ORAL DAILY
Refills: 0 | Status: DISCONTINUED | OUTPATIENT
Start: 2020-09-04 | End: 2020-09-04

## 2020-09-04 RX ORDER — DIPHENHYDRAMINE HCL 50 MG
25 CAPSULE ORAL DAILY
Refills: 0 | Status: DISCONTINUED | OUTPATIENT
Start: 2020-09-04 | End: 2020-09-04

## 2020-09-04 RX ADMIN — HEPARIN SODIUM 5000 UNIT(S): 5000 INJECTION INTRAVENOUS; SUBCUTANEOUS at 05:55

## 2020-09-04 RX ADMIN — Medication 2 DROP(S): at 17:22

## 2020-09-04 RX ADMIN — AMLODIPINE BESYLATE 2.5 MILLIGRAM(S): 2.5 TABLET ORAL at 05:56

## 2020-09-04 RX ADMIN — PANTOPRAZOLE SODIUM 40 MILLIGRAM(S): 20 TABLET, DELAYED RELEASE ORAL at 21:01

## 2020-09-04 RX ADMIN — Medication 1 TABLET(S): at 11:38

## 2020-09-04 RX ADMIN — HEPARIN SODIUM 5000 UNIT(S): 5000 INJECTION INTRAVENOUS; SUBCUTANEOUS at 00:01

## 2020-09-04 RX ADMIN — Medication 100 MICROGRAM(S): at 05:54

## 2020-09-04 RX ADMIN — Medication 1 MILLIGRAM(S): at 11:38

## 2020-09-04 RX ADMIN — PANTOPRAZOLE SODIUM 40 MILLIGRAM(S): 20 TABLET, DELAYED RELEASE ORAL at 05:56

## 2020-09-04 RX ADMIN — Medication 40 MILLIGRAM(S): at 05:55

## 2020-09-04 RX ADMIN — HEPARIN SODIUM 5000 UNIT(S): 5000 INJECTION INTRAVENOUS; SUBCUTANEOUS at 21:01

## 2020-09-04 RX ADMIN — Medication 40 MILLIGRAM(S): at 17:32

## 2020-09-04 RX ADMIN — Medication 1 DROP(S): at 17:24

## 2020-09-04 RX ADMIN — Medication 81 MILLIGRAM(S): at 11:38

## 2020-09-04 RX ADMIN — Medication 1 APPLICATION(S): at 05:56

## 2020-09-04 RX ADMIN — Medication 40 MILLIGRAM(S): at 05:54

## 2020-09-04 NOTE — PROGRESS NOTE ADULT - PROBLEM SELECTOR PLAN 6
Has h/o OA   severe deformities of extremities and also spine -Has h/o OA  -Severe deformities of extremities and also spine

## 2020-09-04 NOTE — PROGRESS NOTE ADULT - PROBLEM SELECTOR PLAN 1
Acute on Chronic Hypercapnia secondary to hyperoxia  -Was mildly dyspneic on admission, has decreased lung sounds b/l, wheezing +  -ABG showed: 7.24/118/174/49 on 2L NC   -CXR- worsened compared to prior, has small volumes,   -Patient has h/o COPD, was a smoker, quit 2 yrs ago.  -Pt could also have ARIN, given body habitus  -Pt is a chronic CO2 retainer, was started on BiPAP in ED due to increased bicarb, which could have worsened hypercapnia  -Later was changed to NC, she improved clinically  -Wells score is 3, PE unlikely. Could not do CT angio chest due to elevated creatinine.  -LE doppler showed no evidence of DVT   -Recommend to keep pt's SpO2 around 92%, avoid over oxygenating as it may suppress respiratory drive   -Pt has now been started on Albuterol Inhaler   Monitor respiratory status  ICU was consulted, deferred admission at this point as she improved  pulmonology Dr ma Acute on Chronic Hypercapnia secondary to hyperoxia  -Was mildly dyspneic on admission, has decreased lung sounds b/l, wheezing +  -ABG showed: 7.24/118/174/49 on 2L NC   -CXR- shows mild to moderate congestion in lungs compared to xray in June with increasing CHF  -Patient has h/o COPD, was a smoker, quit 2 yrs ago.  -Pt could also have ARIN, given body habitus  -Pt is a chronic CO2 retainer, was started on BiPAP in ED due to increased bicarb, which could have worsened hypercapnia  -Later was changed to NC, she improved clinically  -Wells score is 3, PE unlikely. Could not do CT angio chest due to elevated creatinine  -LE doppler showed no evidence of DVT   -Recommend to keep pt's SpO2 around 92%, avoid over oxygenating as it may suppress respiratory drive   -Pt has now been started on Albuterol Inhaler   -Monitor respiratory status  -ICU was consulted, deferred admission at this point as she improved  -F/u Pulmonology Dr. Vincent  -Pt was given IV Lasix 40 mg, stat order, around 2 pm to relieve congestion  -TTE ordered, please follow up results tomorrow (9/5) in AM Acute on Chronic Hypercapnia secondary to hyperoxia  -Was mildly dyspneic on admission, has decreased lung sounds B/L, wheezing +  -ABG showed: 7.24/118/174/49 on 2L NC   -CXR- shows mild to moderate congestion in lungs compared to xray in June with increasing CHF  -Patient has h/o COPD, was a smoker, quit 2 yrs ago.  -Pt could also have ARIN, given body habitus  -Pt is a chronic CO2 retainer, was started on BiPAP in ED due to increased bicarb, which could have worsened hypercapnia  -Later was changed to NC, she improved clinically  -Wells score is 3, PE unlikely. Could not do CT angio chest due to elevated creatinine  -LE doppler showed no evidence of DVT   -Recommend to keep pt's SpO2 around 92%, avoid over oxygenating as it may suppress respiratory drive   -Pt has now been started on Albuterol Inhaler   -Monitor respiratory status  -ICU was consulted, deferred admission at this point as she improved  -F/u Pulmonology Dr. Vincent  -Pt was given IV Lasix 40 mg, stat order, around 2 pm to relieve congestion  -TTE ordered, please follow up results tomorrow (9/5) in AM

## 2020-09-04 NOTE — PROGRESS NOTE ADULT - PROBLEM SELECTOR PLAN 9
On heparin for dvt prophylaxis  on ppi for gi prophylaxis -HEPARIN D/C'ed as pt had mild bleeding (most likely from known hemorrhoid history) 9/4   -Pt is on SCD compression for DVT prophylaxis   -On PPI for GI prophylaxis

## 2020-09-04 NOTE — CONSULT NOTE ADULT - ATTENDING COMMENTS
IMP: This is an 80 yr old woman  with significant medical history of HTN, folate deficiency anemia, GERD, LBP, COPD pressure ulcers, OA, ulcerative colitis, hypothyroidism is sent to ED from [facility] with c/o right eye swelling and dyspnea. Admitted to medicine for respiratory failure secondary to CHF exacerbation.  ICU was consulted for worsening shortness of breath  requiring BiPAP. AMS/ Encephalopathy due to hypercapnia        Assessment;  - Acute on chronic Hypoxic respiratory failure with Hypercapnia  - Acute Exa of COPD  - Encephalopathy   - CHF exacerbation  - COPD  - Eye swelling  - SAUL  - Respiratory acidosis  - Former smoker       Plan:  -transfer to icu.. new BiPaP / hosp policy regarding new BiPaP  -Hypercapnia due to a combination of COPD and possible OHS/ ARIN  -continue BiPaP  -iv steroids  -bronchodilators  -diuresis  -monitor cardiac enzymes  -hemodynamic monitoring   -dvt/gi prophy

## 2020-09-04 NOTE — H&P ADULT - PROBLEM SELECTOR PLAN 1
Acute on Chronic Hypercapnia  secondary to hyperoxia   was mildly dyspneic on admission, has decreased lung sounds b/l, wheezing +  ABG showed: 7.24/118/174/49 on 2L NC   CXR- worsened compared to prior, has small volumes, looks like ggo  Patient has h/o COPD, was a smoker, quit 2 yrs ago.  She could also have ARIN, given body habitus  She is a chronic CO2 retainer, was started on BIpap in ED due to increased bicarb, which could have worsened hypercapnia  Later was changed to NC, she improved clinically  Recommend to keep her SPO2 around 92%, avoid over oxygenating as it may suppress respiratory drive.   Was started on albuterol, IV steroids  Monitor respiratory status  ICU was consulted, deferred admission at this point as she improved Acute on Chronic Hypercapnia  secondary to hyperoxia   was mildly dyspneic on admission, has decreased lung sounds b/l, wheezing +  ABG showed: 7.24/118/174/49 on 2L NC   CXR- worsened compared to prior, has small volumes, looks like ggo  Patient has h/o COPD, was a smoker, quit 2 yrs ago.  She could also have ARIN, given body habitus  She is a chronic CO2 retainer, was started on BIpap in ED due to increased bicarb, which could have worsened hypercapnia  Later was changed to NC, she improved clinically  Wells score is 3, PE unlikely,. Could not do Ct angio chest due to elevated creatinine.  Will do LE doppler to r/o DVT.   Recommend to keep her SPO2 around 92%, avoid over oxygenating as it may suppress respiratory drive.   Was started on albuterol, IV steroids  Monitor respiratory status  ICU was consulted, deferred admission at this point as she improved Acute on Chronic Hypercapnia  secondary to hyperoxia   was mildly dyspneic on admission, has decreased lung sounds b/l, wheezing +  ABG showed: 7.24/118/174/49 on 2L NC   CXR- worsened compared to prior, has small volumes, looks like ggo  Patient has h/o COPD, was a smoker, quit 2 yrs ago.  She could also have ARIN, given body habitus  She is a chronic CO2 retainer, was started on BIpap in ED due to increased bicarb, which could have worsened hypercapnia  Later was changed to NC, she improved clinically  Wells score is 3, PE unlikely,. Could not do Ct angio chest due to elevated creatinine.  Will do LE doppler to r/o DVT.   Recommend to keep her SPO2 around 92%, avoid over oxygenating as it may suppress respiratory drive.   Was started on albuterol, IV steroids  Monitor respiratory status  ICU was consulted, deferred admission at this point as she improved  pulmonology Dr ma

## 2020-09-04 NOTE — H&P ADULT - NSHPPHYSICALEXAM_GEN_ALL_CORE
· CONSTITUTIONAL: Patient is sitting in forward bended position, mild distress  · ENMT: Airway patent, Nasal mucosa clear. Mouth with normal mucosa. Throat has no vesicles, no oropharyngeal exudates and uvula is midline.  · EYES: b/l eyelid swelling, thick discharge on b/l eyes, covering most of the eye right eye lid great than left, no erythema or warmth, no restriction to occular movement  · CARDIAC: Normal rate, regular rhythm.  Heart sounds S1, S2.  No murmurs, rubs or gallops.  · RESPIRATORY: Decreased breath sounds on b/l lung fields  · GASTROINTESTINAL: Abdomen soft, non-tender, no guarding.  · MUSCULOSKELETAL: Spine appears normal, range of motion is not limited, no muscle or joint tenderness  · NEUROLOGICAL: Alert and oriented, no focal deficits, no motor or sensory deficits.  · SKIN: Skin normal color for race, warm, dry and intact. No evidence of rash.  · PSYCHIATRIC: Alert and oriented to person, place, time/situation. normal mood and affect. no apparent risk to self or others.  · HEME LYMPH: No adenopathy or splenomegaly. No cervical or inguinal lymphadenopathy. · CONSTITUTIONAL: Patient is sitting in forward bended position, mild distress  · ENMT: Airway patent, Nasal mucosa clear. Mouth with normal mucosa. Throat has no vesicles, no oropharyngeal exudates and uvula is midline.  · EYES: b/l eyelid swelling, thick discharge on b/l eyes, covering most of the eye right eye lid great than left, no erythema or warmth, no restriction to occular movement  · CARDIAC: Normal rate, regular rhythm.  Heart sounds S1, S2.  No murmurs, rubs or gallops.  · RESPIRATORY: Decreased breath sounds on b/l lung fields with diffuse wheezing  · GASTROINTESTINAL: Abdomen soft, non-tender, no guarding.  · MUSCULOSKELETAL: Spine appears normal, range of motion is not limited, no muscle or joint tenderness  · NEUROLOGICAL: Alert and oriented, no focal deficits, no motor or sensory deficits.  · SKIN: Skin normal color for race, warm, dry and intact. No evidence of rash.  · PSYCHIATRIC: Alert and oriented to person, place, time/situation. normal mood and affect. no apparent risk to self or others.  · HEME LYMPH: No adenopathy or splenomegaly. No cervical or inguinal lymphadenopathy.

## 2020-09-04 NOTE — CONSULT NOTE ADULT - SUBJECTIVE AND OBJECTIVE BOX
Patient is a 80y old  Female who presents with a chief complaint of Dyspnea (04 Sep 2020 16:36)      HPI:  80F with significant medical history of HTN, folate deficiency anemia, GERD, LBP, COPD pressure ulcers, OA, ulcerative colitis, hypothyroidism is sent to ED from [facility] with c/o right eye swelling and dyspnea. Patient reports that her right eye selling has worsened from before. She has increased lacrimal discharge from both eyes. She also has difficulty breathing which has been worsening. She has been sitting up all day and night as she is having difficulty breathing. She states she feels comfortable sitting up with pillow at her back to support her. But says she got used to the dyspnea. She denies any eye pain. She reports that she has had this swelling before and was told to wear an eye patch which helped but she did not wear it this time. She denies fever, chest pain, nausea, vomiting, headache or other symptoms. (04 Sep 2020 01:18)    ED Course: In ED, patient's VS showed: temp 98F, HR 81, /81,  RR 20, SPO2  98% on 2L NC   Her lab work showed: H/H 10.5/38.6, CO2 58, AG:-12, BUN/Cr 31/1.33   ABG showed: 7.24/118/174/49 on 2L NC     Brief hospital course: Pt was admitted to medicine floor for acute on chronic hypoxic respiratory failure likely secondary to CHF exacerbation vs  COPD. Pt was transiently kept on BiPAP at ED. Later on nasal canula. Pt was started on iv steroid, Iv Lasix. Pt's abg showed chronic CO2 retention.   Pt is saturating well on nasal canula but seems drowsy.       Allergies    iodine containing compounds (Unknown)  Milk (Unknown)  morphine (Unknown)  Orange Juice (Unknown)  Rice (Unknown)    Intolerances        MEDICATIONS  (STANDING):  ALBUTerol    90 MICROgram(s) HFA Inhaler 1 Puff(s) Inhalation every 4 hours  artificial  tears Solution 1 Drop(s) Both EYES two times a day  aspirin enteric coated 81 milliGRAM(s) Oral daily  atorvastatin 40 milliGRAM(s) Oral at bedtime  ciprofloxacin  0.3% Ophthalmic Solution 2 Drop(s) Right EYE two times a day  diphenhydrAMINE 25 milliGRAM(s) Oral daily  folic acid 1 milliGRAM(s) Oral daily  furosemide    Tablet 40 milliGRAM(s) Oral daily  furosemide   Injectable 40 milliGRAM(s) IV Push once  hydrocortisone 2.5% Rectal Cream 1 Application(s) Rectal at bedtime  influenza   Vaccine 0.5 milliLiter(s) IntraMuscular once  levothyroxine 100 MICROGram(s) Oral daily  methylPREDNISolone sodium succinate Injectable 40 milliGRAM(s) IV Push daily  metolazone 5 milliGRAM(s) Oral daily  multivitamin/minerals 1 Tablet(s) Oral daily  pantoprazole    Tablet 40 milliGRAM(s) Oral before breakfast  senna 2 Tablet(s) Oral at bedtime    MEDICATIONS  (PRN):      Daily     Daily Weight in k (04 Sep 2020 03:37)    Drug Dosing Weight  Height (cm): 162.56 (2020 13:54)  Weight (kg): 77.7 (03 Sep 2020 15:15)  BMI (kg/m2): 29.4 (03 Sep 2020 15:15)  BSA (m2): 1.83 (03 Sep 2020 15:15)    PAST MEDICAL & SURGICAL HISTORY:  Pressure ulcer  Pain, low back  Ulcerative colitis  Osteoarthritis  Hypothyroidism  GERD (gastroesophageal reflux disease)  Hypertension  History of ankle surgery  H/O: hysterectomy      FAMILY HISTORY:  No pertinent family history in first degree relatives      SOCIAL HISTORY:    ADVANCE DIRECTIVES:    REVIEW OF SYSTEMS:    Pt seems drowsy. Unable to provide detailed history.           ICU Vital Signs Last 24 Hrs  T(C): 36.6 (04 Sep 2020 12:46), Max: 37.1 (03 Sep 2020 20:22)  T(F): 97.8 (04 Sep 2020 12:46), Max: 98.7 (03 Sep 2020 20:22)  HR: 97 (04 Sep 2020 12:46) (81 - 103)  BP: 149/82 (04 Sep 2020 12:46) (122/81 - 164/82)  BP(mean): --  ABP: --  ABP(mean): --  RR: 20 (04 Sep 2020 12:46) (18 - 28)  SpO2: 93% (04 Sep 2020 12:46) (87% - 100%)      ABG - ( 04 Sep 2020 10:53 )  pH, Arterial: 7.24  pH, Blood: x     /  pCO2: 116   /  pO2: 113   / HCO3: 48    / Base Excess: 17.1  /  SaO2: 98                  I&O's Detail      PHYSICAL EXAM:    GENERAL: NAD, well-groomed, well-developed  HEAD:  Atraumatic, Normocephalic  EYES: EOMI, PERRLA, conjunctiva and sclera clear  ENMT: No tonsillar erythema, exudates, or enlargement; Moist mucous membranes, Good dentition, No lesions  NECK: Supple, No JVD, Normal thyroid  NERVOUS SYSTEM:  Alert & Oriented X3, Good concentration; Motor Strength 5/5 B/L upper and lower extremities; DTRs 2+ intact and symmetric  CHEST/LUNG: Clear to percussion bilaterally; No rales, rhonchi, wheezing, or rubs  HEART: Regular rate and rhythm; No murmurs, rubs, or gallops  ABDOMEN: Soft, Nontender, Nondistended; Bowel sounds present  EXTREMITIES:  2+ Peripheral Pulses, No clubbing, cyanosis, or edema  LYMPH: No lymphadenopathy noted  SKIN: No rashes or lesions    LABS:  CBC Full  -  ( 04 Sep 2020 15:13 )  WBC Count : 6.06 K/uL  RBC Count : 3.64 M/uL  Hemoglobin : 10.9 g/dL  Hematocrit : 39.4 %  Platelet Count - Automated : 198 K/uL  Mean Cell Volume : 108.2 fl  Mean Cell Hemoglobin : 29.9 pg  Mean Cell Hemoglobin Concentration : 27.7 gm/dL  Auto Neutrophil # : x  Auto Lymphocyte # : x  Auto Monocyte # : x  Auto Eosinophil # : x  Auto Basophil # : x  Auto Neutrophil % : x  Auto Lymphocyte % : x  Auto Monocyte % : x  Auto Eosinophil % : x  Auto Basophil % : x    09-04    142  |  94<L>  |  29<H>  ----------------------------<  120<H>  4.6   |  44  |  1.09    Ca    8.7      04 Sep 2020 06:50  Phos  3.6     09-04  Mg     2.4     -04    TPro  7.6  /  Alb  3.4<L>  /  TBili  0.5  /  DBili  x   /  AST  16  /  ALT  16  /  AlkPhos  74  09-04    CAPILLARY BLOOD GLUCOSE        PT/INR - ( 04 Sep 2020 16:06 )   PT: 13.1 sec;   INR: 1.13 ratio             RADIOLOGY:    < from: Xray Chest 1 View-PORTABLE IMMEDIATE (20 @ 18:02) >    EXAM:  XR CHEST PORTABLE IMMED 1V                            PROCEDURE DATE:  2020          INTERPRETATION:  AP semisupine chest on September 3, 2020 at 5:55 PM. Patient has increasing CO2 level. Patient was positive for the Covid virus on  with negative tests up to . Patient has facial swelling possibly from allergic reaction. Patient has renal disease and respiratory failure. Patient has COPD. Patient has hypertension. Patient's chin obscures the apices.    Shallow inspiration crowds the chest.    Heart may be enlarged.    There is a mild to moderate central congestion in the lungs increased from .    IMPRESSION: Increasing CHF.                    CRITICAL CARE TIME SPENT: 45 minutes. Patient is a 80y old  Female who presents with a chief complaint of Dyspnea (04 Sep 2020 16:36)      HPI:  80F with significant medical history of HTN, folate deficiency anemia, GERD, LBP, COPD pressure ulcers, OA, ulcerative colitis, hypothyroidism is sent to ED from [facility] with c/o right eye swelling and dyspnea. Patient reports that her right eye selling has worsened from before. She has increased lacrimal discharge from both eyes. She also has difficulty breathing which has been worsening. She has been sitting up all day and night as she is having difficulty breathing. She states she feels comfortable sitting up with pillow at her back to support her. But says she got used to the dyspnea. She denies any eye pain. She reports that she has had this swelling before and was told to wear an eye patch which helped but she did not wear it this time. She denies fever, chest pain, nausea, vomiting, headache or other symptoms. (04 Sep 2020 01:18)    ED Course: In ED, patient's VS showed: temp 98F, HR 81, /81,  RR 20, SPO2  98% on 2L NC   Her lab work showed: H/H 10.5/38.6, CO2 58, AG:-12, BUN/Cr 31/1.33   ABG showed: 7.24/118/174/49 on 2L NC     Brief hospital course: Pt was admitted to medicine floor for acute on chronic hypoxic respiratory failure likely secondary to CHF exacerbation vs  COPD. Pt was transiently kept on BiPAP at ED. Later on nasal canula. Pt was started on iv steroid, Iv Lasix. Pt's abg showed chronic CO2 retention.   Pt is saturating well on nasal canula but seems drowsy.       Allergies    iodine containing compounds (Unknown)  Milk (Unknown)  morphine (Unknown)  Orange Juice (Unknown)  Rice (Unknown)    Intolerances        MEDICATIONS  (STANDING):  ALBUTerol    90 MICROgram(s) HFA Inhaler 1 Puff(s) Inhalation every 4 hours  artificial  tears Solution 1 Drop(s) Both EYES two times a day  aspirin enteric coated 81 milliGRAM(s) Oral daily  atorvastatin 40 milliGRAM(s) Oral at bedtime  ciprofloxacin  0.3% Ophthalmic Solution 2 Drop(s) Right EYE two times a day  diphenhydrAMINE 25 milliGRAM(s) Oral daily  folic acid 1 milliGRAM(s) Oral daily  furosemide    Tablet 40 milliGRAM(s) Oral daily  furosemide   Injectable 40 milliGRAM(s) IV Push once  hydrocortisone 2.5% Rectal Cream 1 Application(s) Rectal at bedtime  influenza   Vaccine 0.5 milliLiter(s) IntraMuscular once  levothyroxine 100 MICROGram(s) Oral daily  methylPREDNISolone sodium succinate Injectable 40 milliGRAM(s) IV Push daily  metolazone 5 milliGRAM(s) Oral daily  multivitamin/minerals 1 Tablet(s) Oral daily  pantoprazole    Tablet 40 milliGRAM(s) Oral before breakfast  senna 2 Tablet(s) Oral at bedtime    MEDICATIONS  (PRN):      Daily     Daily Weight in k (04 Sep 2020 03:37)    Drug Dosing Weight  Height (cm): 162.56 (2020 13:54)  Weight (kg): 77.7 (03 Sep 2020 15:15)  BMI (kg/m2): 29.4 (03 Sep 2020 15:15)  BSA (m2): 1.83 (03 Sep 2020 15:15)    PAST MEDICAL & SURGICAL HISTORY:  Pressure ulcer  Pain, low back  Ulcerative colitis  Osteoarthritis  Hypothyroidism  GERD (gastroesophageal reflux disease)  Hypertension  History of ankle surgery  H/O: hysterectomy      FAMILY HISTORY:  No pertinent family history in first degree relatives      SOCIAL HISTORY:    ADVANCE DIRECTIVES:    REVIEW OF SYSTEMS:    Pt seems drowsy. Unable to provide detailed history.           ICU Vital Signs Last 24 Hrs  T(C): 36.6 (04 Sep 2020 12:46), Max: 37.1 (03 Sep 2020 20:22)  T(F): 97.8 (04 Sep 2020 12:46), Max: 98.7 (03 Sep 2020 20:22)  HR: 97 (04 Sep 2020 12:46) (81 - 103)  BP: 149/82 (04 Sep 2020 12:46) (122/81 - 164/82)  BP(mean): --  ABP: --  ABP(mean): --  RR: 20 (04 Sep 2020 12:46) (18 - 28)  SpO2: 93% (04 Sep 2020 12:46) (87% - 100%)      ABG - ( 04 Sep 2020 10:53 )  pH, Arterial: 7.24  pH, Blood: x     /  pCO2: 116   /  pO2: 113   / HCO3: 48    / Base Excess: 17.1  /  SaO2: 98                  I&O's Detail      PHYSICAL EXAM:    GENERAL: NAD, well-groomed, well-developed  HEAD:  Atraumatic, Normocephalic  EYES: EOMI, PERRLA, conjunctiva and sclera clear  ENMT: No tonsillar erythema, exudates, or enlargement; Moist mucous membranes, Good dentition, No lesions  NECK: Supple, No JVD, Normal thyroid  NERVOUS SYSTEM:  Pt seems drowsy. No focal deficit  CHEST/LUNG: Clear to percussion bilaterally; No rales, rhonchi, wheezing, or rubs  HEART: Regular rate and rhythm; No murmurs, rubs, or gallops  ABDOMEN: Soft, Nontender, Nondistended; Bowel sounds present  EXTREMITIES:  2+ Peripheral Pulses, No clubbing, cyanosis, or edema  LYMPH: No lymphadenopathy noted  SKIN: No rashes or lesions    LABS:  CBC Full  -  ( 04 Sep 2020 15:13 )  WBC Count : 6.06 K/uL  RBC Count : 3.64 M/uL  Hemoglobin : 10.9 g/dL  Hematocrit : 39.4 %  Platelet Count - Automated : 198 K/uL  Mean Cell Volume : 108.2 fl  Mean Cell Hemoglobin : 29.9 pg  Mean Cell Hemoglobin Concentration : 27.7 gm/dL  Auto Neutrophil # : x  Auto Lymphocyte # : x  Auto Monocyte # : x  Auto Eosinophil # : x  Auto Basophil # : x  Auto Neutrophil % : x  Auto Lymphocyte % : x  Auto Monocyte % : x  Auto Eosinophil % : x  Auto Basophil % : x    -    142  |  94<L>  |  29<H>  ----------------------------<  120<H>  4.6   |  44  |  1.09    Ca    8.7      04 Sep 2020 06:50  Phos  3.6     09-  Mg     2.4     09-04    TPro  7.6  /  Alb  3.4<L>  /  TBili  0.5  /  DBili  x   /  AST  16  /  ALT  16  /  AlkPhos  74  09-04    CAPILLARY BLOOD GLUCOSE        PT/INR - ( 04 Sep 2020 16:06 )   PT: 13.1 sec;   INR: 1.13 ratio             RADIOLOGY:    < from: Xray Chest 1 View-PORTABLE IMMEDIATE (20 @ 18:02) >    EXAM:  XR CHEST PORTABLE IMMED 1V                            PROCEDURE DATE:  2020          INTERPRETATION:  AP semisupine chest on September 3, 2020 at 5:55 PM. Patient has increasing CO2 level. Patient was positive for the Covid virus on  with negative tests up to . Patient has facial swelling possibly from allergic reaction. Patient has renal disease and respiratory failure. Patient has COPD. Patient has hypertension. Patient's chin obscures the apices.    Shallow inspiration crowds the chest.    Heart may be enlarged.    There is a mild to moderate central congestion in the lungs increased from .    IMPRESSION: Increasing CHF.                    CRITICAL CARE TIME SPENT: 45 minutes. Patient is a 80y old  Female who presents with a chief complaint of Dyspnea (04 Sep 2020 16:36)      HPI:  80F with significant medical history of HTN, folate deficiency anemia, GERD, LBP, COPD pressure ulcers, OA, ulcerative colitis, hypothyroidism is sent to ED from [facility] with c/o right eye swelling and dyspnea. Patient reports that her right eye selling has worsened from before. She has increased lacrimal discharge from both eyes. She also has difficulty breathing which has been worsening. She has been sitting up all day and night as she is having difficulty breathing. She states she feels comfortable sitting up with pillow at her back to support her. But says she got used to the dyspnea. She denies any eye pain. She reports that she has had this swelling before and was told to wear an eye patch which helped but she did not wear it this time. She denies fever, chest pain, nausea, vomiting, headache or other symptoms. (04 Sep 2020 01:18)    ED Course: In ED, patient's VS showed: temp 98F, HR 81, /81,  RR 20, SPO2  98% on 2L NC   Her lab work showed: H/H 10.5/38.6, CO2 58, AG:-12, BUN/Cr 31/1.33   ABG showed: 7.24/118/174/49 on 2L NC     Brief hospital course: Pt was admitted to medicine floor for acute on chronic hypoxic respiratory failure likely secondary to CHF exacerbation vs  COPD. Pt was transiently kept on BiPAP at ED. Later on nasal canula. Pt was started on iv steroid, Iv Lasix. Pt's abg showed chronic CO2 retention.   Pt is saturating well on nasal canula but seems drowsy.       Allergies    iodine containing compounds (Unknown)  Milk (Unknown)  morphine (Unknown)  Orange Juice (Unknown)  Rice (Unknown)    Intolerances        MEDICATIONS  (STANDING):  ALBUTerol    90 MICROgram(s) HFA Inhaler 1 Puff(s) Inhalation every 4 hours  artificial  tears Solution 1 Drop(s) Both EYES two times a day  aspirin enteric coated 81 milliGRAM(s) Oral daily  atorvastatin 40 milliGRAM(s) Oral at bedtime  ciprofloxacin  0.3% Ophthalmic Solution 2 Drop(s) Right EYE two times a day  diphenhydrAMINE 25 milliGRAM(s) Oral daily  folic acid 1 milliGRAM(s) Oral daily  furosemide    Tablet 40 milliGRAM(s) Oral daily  furosemide   Injectable 40 milliGRAM(s) IV Push once  hydrocortisone 2.5% Rectal Cream 1 Application(s) Rectal at bedtime  influenza   Vaccine 0.5 milliLiter(s) IntraMuscular once  levothyroxine 100 MICROGram(s) Oral daily  methylPREDNISolone sodium succinate Injectable 40 milliGRAM(s) IV Push daily  metolazone 5 milliGRAM(s) Oral daily  multivitamin/minerals 1 Tablet(s) Oral daily  pantoprazole    Tablet 40 milliGRAM(s) Oral before breakfast  senna 2 Tablet(s) Oral at bedtime    MEDICATIONS  (PRN):      Daily     Daily Weight in k (04 Sep 2020 03:37)    Drug Dosing Weight  Height (cm): 162.56 (2020 13:54)  Weight (kg): 77.7 (03 Sep 2020 15:15)  BMI (kg/m2): 29.4 (03 Sep 2020 15:15)  BSA (m2): 1.83 (03 Sep 2020 15:15)    PAST MEDICAL & SURGICAL HISTORY:  Pressure ulcer  Pain, low back  Ulcerative colitis  Osteoarthritis  Hypothyroidism  GERD (gastroesophageal reflux disease)  Hypertension  History of ankle surgery  H/O: hysterectomy      FAMILY HISTORY:  No pertinent family history in first degree relatives      SOCIAL HISTORY: smoked 1 PPD until 2019, + second hand smoke exposure from      ADVANCE DIRECTIVES:    REVIEW OF SYSTEMS:    Pt seems drowsy. Unable to provide detailed history.           ICU Vital Signs Last 24 Hrs  T(C): 36.6 (04 Sep 2020 12:46), Max: 37.1 (03 Sep 2020 20:22)  T(F): 97.8 (04 Sep 2020 12:46), Max: 98.7 (03 Sep 2020 20:22)  HR: 97 (04 Sep 2020 12:46) (81 - 103)  BP: 149/82 (04 Sep 2020 12:46) (122/81 - 164/82)  BP(mean): --  ABP: --  ABP(mean): --  RR: 20 (04 Sep 2020 12:46) (18 - 28)  SpO2: 93% (04 Sep 2020 12:46) (87% - 100%)      ABG - ( 04 Sep 2020 10:53 )  pH, Arterial: 7.24  pH, Blood: x     /  pCO2: 116   /  pO2: 113   / HCO3: 48    / Base Excess: 17.1  /  SaO2: 98                  I&O's Detail      PHYSICAL EXAM:    GENERAL: NAD, well-groomed, well-developed  HEAD:  Atraumatic, Normocephalic  EYES: EOMI, PERRLA, conjunctiva and sclera clear  ENMT: No tonsillar erythema, exudates, or enlargement; Moist mucous membranes, Good dentition, No lesions  NECK: Supple, No JVD, Normal thyroid  NERVOUS SYSTEM:  Pt seems drowsy. No focal deficit  CHEST/LUNG: Clear to percussion bilaterally; No rales, rhonchi, wheezing, or rubs  HEART: Regular rate and rhythm; No murmurs, rubs, or gallops  ABDOMEN: Soft, Nontender, Nondistended; Bowel sounds present  EXTREMITIES:  2+ Peripheral Pulses, No clubbing, cyanosis, or edema  LYMPH: No lymphadenopathy noted  SKIN: No rashes or lesions    LABS:  CBC Full  -  ( 04 Sep 2020 15:13 )  WBC Count : 6.06 K/uL  RBC Count : 3.64 M/uL  Hemoglobin : 10.9 g/dL  Hematocrit : 39.4 %  Platelet Count - Automated : 198 K/uL  Mean Cell Volume : 108.2 fl  Mean Cell Hemoglobin : 29.9 pg  Mean Cell Hemoglobin Concentration : 27.7 gm/dL  Auto Neutrophil # : x  Auto Lymphocyte # : x  Auto Monocyte # : x  Auto Eosinophil # : x  Auto Basophil # : x  Auto Neutrophil % : x  Auto Lymphocyte % : x  Auto Monocyte % : x  Auto Eosinophil % : x  Auto Basophil % : x    -    142  |  94<L>  |  29<H>  ----------------------------<  120<H>  4.6   |  44  |  1.09    Ca    8.7      04 Sep 2020 06:50  Phos  3.6     09-  Mg     2.4     -04    TPro  7.6  /  Alb  3.4<L>  /  TBili  0.5  /  DBili  x   /  AST  16  /  ALT  16  /  AlkPhos  74  -04    CAPILLARY BLOOD GLUCOSE        PT/INR - ( 04 Sep 2020 16:06 )   PT: 13.1 sec;   INR: 1.13 ratio             RADIOLOGY:    < from: Xray Chest 1 View-PORTABLE IMMEDIATE (20 @ 18:02) >    EXAM:  XR CHEST PORTABLE IMMED 1V                            PROCEDURE DATE:  2020          INTERPRETATION:  AP semisupine chest on September 3, 2020 at 5:55 PM. Patient has increasing CO2 level. Patient was positive for the Covid virus on  with negative tests up to . Patient has facial swelling possibly from allergic reaction. Patient has renal disease and respiratory failure. Patient has COPD. Patient has hypertension. Patient's chin obscures the apices.    Shallow inspiration crowds the chest.    Heart may be enlarged.    There is a mild to moderate central congestion in the lungs increased from .    IMPRESSION: Increasing CHF.                    CRITICAL CARE TIME SPENT: 45 minutes.

## 2020-09-04 NOTE — CONSULT NOTE ADULT - SUBJECTIVE AND OBJECTIVE BOX
Time of visit:    CHIEF COMPLAINT: Patient is a 80y old  Female who presents with a chief complaint of Dyspnea (04 Sep 2020 14:59)      HPI:  80F with significant medical history of HTN, folate deficiency anemia, GERD, LBP, COPD pressure ulcers, OA, ulcerative colitis, hypothyroidism is sent to ED from [facility] with c/o right eye swelling and dyspnea. Patient reports that her right eye selling has worsened from before. She has increased lacrimal discharge from both eyes. She also has difficulty breathing which has been worsening. She has been sitting up all day and night as she is having difficulty breathing. She states she feels comfortable sitting up with pillow at her back to support her. But says she got used to the dyspnea. She denies any eye pain. She reports that she has had this swelling before and was told to wear an eye patch which helped but she did not wear it this time. She denies fever, chest pain, nausea, vomiting, headache or other symptoms. (04 Sep 2020 01:18)   Patient seen and examined.     PAST MEDICAL & SURGICAL HISTORY:  Pressure ulcer  Pain, low back  Ulcerative colitis  Osteoarthritis  Hypothyroidism  GERD (gastroesophageal reflux disease)  Hypertension  History of ankle surgery  H/O: hysterectomy      Allergies    iodine containing compounds (Unknown)  Milk (Unknown)  morphine (Unknown)  Orange Juice (Unknown)  Rice (Unknown)    Intolerances        MEDICATIONS  (STANDING):  ALBUTerol    90 MICROgram(s) HFA Inhaler 1 Puff(s) Inhalation every 4 hours  amLODIPine   Tablet 2.5 milliGRAM(s) Oral daily  artificial  tears Solution 1 Drop(s) Both EYES two times a day  aspirin enteric coated 81 milliGRAM(s) Oral daily  atorvastatin 40 milliGRAM(s) Oral at bedtime  ciprofloxacin  0.3% Ophthalmic Solution 2 Drop(s) Right EYE two times a day  diphenhydrAMINE 25 milliGRAM(s) Oral daily  folic acid 1 milliGRAM(s) Oral daily  furosemide    Tablet 40 milliGRAM(s) Oral daily  furosemide   Injectable 40 milliGRAM(s) IV Push once  hydrocortisone 2.5% Rectal Cream 1 Application(s) Rectal at bedtime  influenza   Vaccine 0.5 milliLiter(s) IntraMuscular once  levothyroxine 100 MICROGram(s) Oral daily  methylPREDNISolone sodium succinate Injectable 40 milliGRAM(s) IV Push daily  metolazone 5 milliGRAM(s) Oral daily  multivitamin/minerals 1 Tablet(s) Oral daily  pantoprazole    Tablet 40 milliGRAM(s) Oral before breakfast  senna 2 Tablet(s) Oral at bedtime      MEDICATIONS  (PRN):   Medications up to date at time of exam.    Medications up to date at time of exam.    FAMILY HISTORY:  No pertinent family history in first degree relatives      SOCIAL HISTORY  Smoking History: [   ] smoking/smoke exposure, [   ] former smoker  Living Condition: [   ] apartment, [   ] private house  Work History:   Travel History: denies recent travel  Illicit Substance Use: denies  Alcohol Use: denies    REVIEW OF SYSTEMS:    CONSTITUTIONAL:  denies fevers, chills, sweats, weight loss    HEENT:  denies diplopia or blurred vision, sore throat or runny nose.    CARDIOVASCULAR:  denies pressure, squeezing, tightness, or heaviness about the chest; no palpitations.    RESPIRATORY:  denies SOB, cough, ESPINOZA, wheezing.    GASTROINTESTINAL:  denies abdominal pain, nausea, vomiting or diarrhea.    GENITOURINARY: denies dysuria, frequency or urgency.    NEUROLOGIC:  denies numbness, tingling, seizures or weakness.    PSYCHIATRIC:  denies disorder of thought or mood.    MSK: denies swelling, redness      PHYSICAL EXAMINATION:    GENERAL: The patient is a well-developed, well-nourished, in no apparent distress.     Vital Signs Last 24 Hrs  T(C): 36.6 (04 Sep 2020 12:46), Max: 37.1 (03 Sep 2020 20:22)  T(F): 97.8 (04 Sep 2020 12:46), Max: 98.7 (03 Sep 2020 20:22)  HR: 97 (04 Sep 2020 12:46) (81 - 103)  BP: 149/82 (04 Sep 2020 12:46) (122/81 - 164/82)  BP(mean): --  RR: 20 (04 Sep 2020 12:46) (18 - 28)  SpO2: 93% (04 Sep 2020 12:46) (87% - 100%)   (if applicable)    Chest Tube (if applicable)    HEENT: Head is normocephalic and atraumatic. Extraocular muscles are intact. Mucous membranes are moist.     NECK: Supple, no palpable adenopathy.    LUNGS: Clear to auscultation, no wheezing, rales, or rhonchi.    HEART: Regular rate and rhythm without murmur.    ABDOMEN: Soft, nontender, and nondistended.  No hepatosplenomegaly is noted.    RENAL: No difficulty voiding, no pelvic pain    EXTREMITIES: Without any cyanosis, clubbing, rash, lesions or edema.    NEUROLOGIC: Awake, alert, oriented, grossly intact    SKIN: Warm, dry, good turgor.      LABS:                        10.9   6.06  )-----------( 198      ( 04 Sep 2020 15:13 )             39.4     09-04    142  |  94<L>  |  29<H>  ----------------------------<  120<H>  4.6   |  44  |  1.09    Ca    8.7      04 Sep 2020 06:50  Phos  3.6     09-04  Mg     2.4     09-04    TPro  7.6  /  Alb  3.4<L>  /  TBili  0.5  /  DBili  x   /  AST  16  /  ALT  16  /  AlkPhos  74  09-04    PT/INR - ( 04 Sep 2020 16:06 )   PT: 13.1 sec;   INR: 1.13 ratio             ABG - ( 04 Sep 2020 10:53 )  pH, Arterial: 7.24  pH, Blood: x     /  pCO2: 116   /  pO2: 113   / HCO3: 48    / Base Excess: 17.1  /  SaO2: 98                      Serum Pro-Brain Natriuretic Peptide: 2025 pg/mL (09-04-20 @ 15:13)          MICROBIOLOGY: (if applicable)    RADIOLOGY & ADDITIONAL STUDIES:  EKG:   CXR:  ECHO:    IMPRESSION: 80y Female PAST MEDICAL & SURGICAL HISTORY:  Pressure ulcer  Pain, low back  Ulcerative colitis  Osteoarthritis  Hypothyroidism  GERD (gastroesophageal reflux disease)  Hypertension  History of ankle surgery  H/O: hysterectomy   p/w                   RECOMMENDATIONS:

## 2020-09-04 NOTE — PROGRESS NOTE ADULT - SUBJECTIVE AND OBJECTIVE BOX
PGY-1 Progress Note discussed with attending    PAGER #: [[phone]] TILL 5:00 PM  PLEASE CONTACT ON CALL TEAM:  - On Call Team (Please refer to Kenyon) FROM 5:00 PM - 8:30PM  - Nightfloat Team FROM 8:30 -7:30 AM    CHIEF COMPLAINT & BRIEF HOSPITAL COURSE: 79 y/o F from [facility] with significant medical history of HTN, folate deficiency anemia, GERD, LBP, COPD, Pressure ulcers, OA, Ulcerative Colitis, Hypothyroidism with c/o right eye swelling and dyspnea. Patient reports that her right eye selling has worsened from before. She has increased lacrimal discharge from both eyes. She also has difficulty breathing which has been worsening. She has been sitting up all day and night as she is having difficulty breathing. She states she feels comfortable sitting up with pillow at her back to support her. But says she got used to the dyspnea. She denies any eye pain. She reports that she has had this swelling before and was told to wear an eye patch which helped but she did not wear it this time. She denies fever, chest pain, nausea, vomiting, headache or other symptoms.    INTERVAL HPI/OVERNIGHT EVENTS:     MEDICATIONS  (STANDING):  ALBUTerol    90 MICROgram(s) HFA Inhaler 1 Puff(s) Inhalation every 4 hours  amLODIPine   Tablet 2.5 milliGRAM(s) Oral daily  artificial  tears Solution 1 Drop(s) Both EYES two times a day  aspirin enteric coated 81 milliGRAM(s) Oral daily  atorvastatin 40 milliGRAM(s) Oral at bedtime  ciprofloxacin  0.3% Ophthalmic Solution 2 Drop(s) Right EYE two times a day  diphenhydrAMINE 25 milliGRAM(s) Oral daily  folic acid 1 milliGRAM(s) Oral daily  furosemide    Tablet 40 milliGRAM(s) Oral daily  furosemide   Injectable 40 milliGRAM(s) IV Push once  hydrocortisone 2.5% Rectal Cream 1 Application(s) Rectal at bedtime  influenza   Vaccine 0.5 milliLiter(s) IntraMuscular once  levothyroxine 100 MICROGram(s) Oral daily  methylPREDNISolone sodium succinate Injectable 40 milliGRAM(s) IV Push daily  metolazone 5 milliGRAM(s) Oral daily  multivitamin/minerals 1 Tablet(s) Oral daily  pantoprazole    Tablet 40 milliGRAM(s) Oral before breakfast  senna 2 Tablet(s) Oral at bedtime    MEDICATIONS  (PRN):      REVIEW OF SYSTEMS:  CONSTITUTIONAL: No fever, weight loss, or fatigue  RESPIRATORY: No cough, wheezing, chills or hemoptysis; No shortness of breath  CARDIOVASCULAR: No chest pain, palpitations, dizziness, or leg swelling  GASTROINTESTINAL: No abdominal pain. No nausea, vomiting, or hematemesis; No diarrhea or constipation. No melena or hematochezia.  GENITOURINARY: No dysuria or hematuria, urinary frequency  NEUROLOGICAL: No headaches, memory loss, loss of strength, numbness, or tremors  SKIN: No itching, burning, rashes, or lesions     Vital Signs Last 24 Hrs  T(C): 36.6 (04 Sep 2020 12:46), Max: 37.1 (03 Sep 2020 20:22)  T(F): 97.8 (04 Sep 2020 12:46), Max: 98.7 (03 Sep 2020 20:22)  HR: 97 (04 Sep 2020 12:46) (81 - 103)  BP: 149/82 (04 Sep 2020 12:46) (122/81 - 164/82)  BP(mean): --  RR: 20 (04 Sep 2020 12:46) (18 - 28)  SpO2: 93% (04 Sep 2020 12:46) (87% - 100%)    PHYSICAL EXAMINATION:  GENERAL: NAD, well built  HEAD:  Atraumatic, Normocephalic  EYES:  conjunctiva and sclera clear  NECK: Supple, No JVD, Normal thyroid  CHEST/LUNG: Clear to auscultation. Clear to percussion bilaterally; No rales, rhonchi, wheezing, or rubs  HEART: Regular rate and rhythm; No murmurs, rubs, or gallops  ABDOMEN: Soft, Nontender, Nondistended; Bowel sounds present  NERVOUS SYSTEM:  Alert & Oriented X3,    EXTREMITIES:  2+ Peripheral Pulses, No clubbing, cyanosis, or edema  SKIN: warm dry                          11.6   7.58  )-----------( 218      ( 04 Sep 2020 06:50 )             41.7     09-04    142  |  94<L>  |  29<H>  ----------------------------<  120<H>  4.6   |  44  |  1.09    Ca    8.7      04 Sep 2020 06:50  Phos  3.6     09-04  Mg     2.4     09-04    TPro  7.6  /  Alb  3.4<L>  /  TBili  0.5  /  DBili  x   /  AST  16  /  ALT  16  /  AlkPhos  74  09-04    LIVER FUNCTIONS - ( 04 Sep 2020 06:50 )  Alb: 3.4 g/dL / Pro: 7.6 g/dL / ALK PHOS: 74 U/L / ALT: 16 U/L DA / AST: 16 U/L / GGT: x               PT/INR - ( 04 Sep 2020 06:50 )   PT: 12.5 sec;   INR: 1.07 ratio             CAPILLARY BLOOD GLUCOSE      RADIOLOGY & ADDITIONAL TESTS: PGY-1 Progress Note discussed with attending    PAGER #: [[phone]] TILL 5:00 PM  PLEASE CONTACT ON CALL TEAM:  - On Call Team (Please refer to Kenyon) FROM 5:00 PM - 8:30PM  - Nightfloat Team FROM 8:30 -7:30 AM    CHIEF COMPLAINT & BRIEF HOSPITAL COURSE: 81 y/o F from [facility] with significant medical history of HTN, folate deficiency anemia, GERD, LBP, COPD, Pressure ulcers, OA, Ulcerative Colitis, Hypothyroidism with c/o right eye swelling and dyspnea. Patient reports that her right eye selling has worsened from before. She has increased lacrimal discharge from both eyes. She also has difficulty breathing which has been worsening. She has been sitting up all day and night as she is having difficulty breathing. She states she feels comfortable sitting up with pillow at her back to support her. But says she got used to the dyspnea. She denies any eye pain. She reports that she has had this swelling before and was told to wear an eye patch which helped but she did not wear it this time. She denies fever, chest pain, nausea, vomiting, headache or other symptoms.    INTERVAL HPI/OVERNIGHT EVENTS: Pt assessed this morning, saturating 98% on 3 L NC. Will monitor respiratory status closely.    MEDICATIONS  (STANDING):  ALBUTerol    90 MICROgram(s) HFA Inhaler 1 Puff(s) Inhalation every 4 hours  amLODIPine   Tablet 2.5 milliGRAM(s) Oral daily  artificial  tears Solution 1 Drop(s) Both EYES two times a day  aspirin enteric coated 81 milliGRAM(s) Oral daily  atorvastatin 40 milliGRAM(s) Oral at bedtime  ciprofloxacin  0.3% Ophthalmic Solution 2 Drop(s) Right EYE two times a day  diphenhydrAMINE 25 milliGRAM(s) Oral daily  folic acid 1 milliGRAM(s) Oral daily  furosemide    Tablet 40 milliGRAM(s) Oral daily  furosemide   Injectable 40 milliGRAM(s) IV Push once  hydrocortisone 2.5% Rectal Cream 1 Application(s) Rectal at bedtime  influenza   Vaccine 0.5 milliLiter(s) IntraMuscular once  levothyroxine 100 MICROGram(s) Oral daily  methylPREDNISolone sodium succinate Injectable 40 milliGRAM(s) IV Push daily  metolazone 5 milliGRAM(s) Oral daily  multivitamin/minerals 1 Tablet(s) Oral daily  pantoprazole    Tablet 40 milliGRAM(s) Oral before breakfast  senna 2 Tablet(s) Oral at bedtime    MEDICATIONS  (PRN):      REVIEW OF SYSTEMS:  CONSTITUTIONAL: No fever, weight loss, or fatigue  RESPIRATORY: No cough, wheezing, chills or hemoptysis; No shortness of breath  CARDIOVASCULAR: No chest pain, palpitations, dizziness, or leg swelling  GASTROINTESTINAL: No abdominal pain. No nausea, vomiting, or hematemesis; No diarrhea or constipation. No melena or hematochezia.  GENITOURINARY: No dysuria or hematuria, urinary frequency  NEUROLOGICAL: No headaches, memory loss, loss of strength, numbness, or tremors  SKIN: No itching, burning, rashes, or lesions     Vital Signs Last 24 Hrs  T(C): 36.6 (04 Sep 2020 12:46), Max: 37.1 (03 Sep 2020 20:22)  T(F): 97.8 (04 Sep 2020 12:46), Max: 98.7 (03 Sep 2020 20:22)  HR: 97 (04 Sep 2020 12:46) (81 - 103)  BP: 149/82 (04 Sep 2020 12:46) (122/81 - 164/82)  BP(mean): --  RR: 20 (04 Sep 2020 12:46) (18 - 28)  SpO2: 93% (04 Sep 2020 12:46) (87% - 100%)    PHYSICAL EXAMINATION:  GENERAL: NAD, well built  HEAD:  Atraumatic, Normocephalic  EYES:  conjunctiva and sclera clear  NECK: Supple, No JVD, Normal thyroid  CHEST/LUNG: Clear to auscultation. Clear to percussion bilaterally; No rales, rhonchi, wheezing, or rubs  HEART: Regular rate and rhythm; No murmurs, rubs, or gallops  ABDOMEN: Soft, Nontender, Nondistended; Bowel sounds present  NERVOUS SYSTEM:  Alert & Oriented X3,    EXTREMITIES:  2+ Peripheral Pulses, No clubbing, cyanosis, or edema  SKIN: warm dry                          11.6   7.58  )-----------( 218      ( 04 Sep 2020 06:50 )             41.7     09-04    142  |  94<L>  |  29<H>  ----------------------------<  120<H>  4.6   |  44  |  1.09    Ca    8.7      04 Sep 2020 06:50  Phos  3.6     09-04  Mg     2.4     09-04    TPro  7.6  /  Alb  3.4<L>  /  TBili  0.5  /  DBili  x   /  AST  16  /  ALT  16  /  AlkPhos  74  09-04    LIVER FUNCTIONS - ( 04 Sep 2020 06:50 )  Alb: 3.4 g/dL / Pro: 7.6 g/dL / ALK PHOS: 74 U/L / ALT: 16 U/L DA / AST: 16 U/L / GGT: x               PT/INR - ( 04 Sep 2020 06:50 )   PT: 12.5 sec;   INR: 1.07 ratio             CAPILLARY BLOOD GLUCOSE      RADIOLOGY & ADDITIONAL TESTS:

## 2020-09-04 NOTE — PROGRESS NOTE ADULT - ASSESSMENT
80F with significant medical history of HTN, folate deficiency anemia, GERD, LBP, pressure ulcers, OA, ulcerative colitis, hypothyroidism is sent to ED from [facility] with c/o right eye swelling and dyspnea.     ED:  ABG- Respiratory acidosis with compensation  CXR- small lung fields, with diffuse infiltrates  patient's VS showed: temp 98F, HR 81, /81,  RR 20, SPO2  98% on 2L NC   LAbs : H/H 10.5/38.6, CO2 58, AG:-12, BUN/Cr 31/1.33   ABG showed: 7.24/118/174/49 on 2L NC   She was initially placed on Bi-PAP which was later tapered off to 1L NC.       Patient is being admitted to medicine for Acute Respiratory Failure 2/2 to COPD exacerbation.

## 2020-09-04 NOTE — H&P ADULT - PROBLEM SELECTOR PLAN 3
Presented with eye swelling  Also has thick secretions on her eyelids  Could be conjunctivitis vs dacrocystitis vs cellulitis  HAs h/o dacrocystitis in past  Will start on erythromycin drops  Artificial drops  Needs ophthalmologist referral

## 2020-09-04 NOTE — PROGRESS NOTE ADULT - PROBLEM SELECTOR PLAN 4
Presented with elevated creatinine  LAst known  in NH was wnl  Likely due to dehydration  F/u urine lytes  f/u BMP  Avoid nephrotoxins -Pt has hx of Folate deficient anemia  -Hb 11.6, .5 in 9/4 AM, Folate level normal  -Mild bleeding noted on pt's gown today (likely from known hx of Hemorrhoids)  -CBC stat was ordered at 3 pm (Hb 10.9 at 3 pm)  -Monitor CBC H/H, vitals  -F/u FOBT

## 2020-09-04 NOTE — PROGRESS NOTE ADULT - PROBLEM SELECTOR PLAN 5
Has h/o Htn  C/w home meds with parameters  Monitor BP -Pt has hx HTN  C/w home meds with parameters  Monitor BP -Pt has hx HTN  -C/w home meds Amlodipine and Metolazone  -Monitor BP, hold meds with parameters

## 2020-09-04 NOTE — H&P ADULT - HISTORY OF PRESENT ILLNESS
80F with significant medical history of HTN, folate deficiency anemia, GERD, LBP, pressure ulcers, OA, ulcerative colitis, hypothyroidism is sent to ED from [facility] with c/o right eye swelling. Patient reports that her right eye selling has worsened from before. She has increased lacrimal discharge from both eyes. She also has difficulty breathing which has been worsening. She has been sitting up all day and night as she is having difficulty breathing. She denies any eye pain. She reports that she has had this swelling before and was told to wear an eye patch which helped but she did not wear it this time. She denies fever, chest pain, nausea, vomiting, shortness of breath, headache or other symptoms. 80F with significant medical history of HTN, folate deficiency anemia, GERD, LBP, COPD pressure ulcers, OA, ulcerative colitis, hypothyroidism is sent to ED from [facility] with c/o right eye swelling and dyspnea. Patient reports that her right eye selling has worsened from before. She has increased lacrimal discharge from both eyes. She also has difficulty breathing which has been worsening. She has been sitting up all day and night as she is having difficulty breathing. She states she feels comfortable sitting up with pillow at her back to support her. She denies any eye pain. She reports that she has had this swelling before and was told to wear an eye patch which helped but she did not wear it this time. She denies fever, chest pain, nausea, vomiting, headache or other symptoms. 80F with significant medical history of HTN, folate deficiency anemia, GERD, LBP, COPD pressure ulcers, OA, ulcerative colitis, hypothyroidism is sent to ED from [facility] with c/o right eye swelling and dyspnea. Patient reports that her right eye selling has worsened from before. She has increased lacrimal discharge from both eyes. She also has difficulty breathing which has been worsening. She has been sitting up all day and night as she is having difficulty breathing. She states she feels comfortable sitting up with pillow at her back to support her. But says she got used to the dyspnea. She denies any eye pain. She reports that she has had this swelling before and was told to wear an eye patch which helped but she did not wear it this time. She denies fever, chest pain, nausea, vomiting, headache or other symptoms.

## 2020-09-04 NOTE — H&P ADULT - PROBLEM SELECTOR PLAN 4
Presented with elevated creatinine  LAst known  in NH was wnl  Likely due to dehydration  F/u urine lytes  f/u BMP  Avoid nephrotoxins

## 2020-09-04 NOTE — PROGRESS NOTE ADULT - PROBLEM SELECTOR PLAN 3
Presented with eye swelling  Also has thick secretions on her eyelids  Could be conjunctivitis vs dacrocystitis vs cellulitis  HAs h/o dacrocystitis in past  Will start on erythromycin drops  Artificial drops  Needs ophthalmologist referral -Pt p/w R eye swelling, no visual disturbances  -Has hx of Dacrocystitis  -Pt is now started on Ciprofloxacin Eye drops R eye today   -Monitor for improvement with Eye drops

## 2020-09-04 NOTE — H&P ADULT - ASSESSMENT
80F with significant medical history of HTN, folate deficiency anemia, GERD, LBP, pressure ulcers, OA, ulcerative colitis, hypothyroidism is sent to ED from [facility] with c/o right eye swelling and dyspnea. 80F with significant medical history of HTN, folate deficiency anemia, GERD, LBP, pressure ulcers, OA, ulcerative colitis, hypothyroidism is sent to ED from [facility] with c/o right eye swelling and dyspnea.       ED:  ABG- Respiratory acidosis with compensation  CXR- small lung fields, with diffuse infiltrates  patient's VS showed: temp 98F, HR 81, /81,  RR 20, SPO2  98% on 2L NC   LAbs : H/H 10.5/38.6, CO2 58, AG:-12, BUN/Cr 31/1.33   ABG showed: 7.24/118/174/49 on 2L NC   She was initially placed on Bi-PAP which was later tapered off to 1L NC.       Patient is being admitted to medicine for Acute respiratory failure secondary to COPD exacerbation.

## 2020-09-04 NOTE — CONSULT NOTE ADULT - ASSESSMENT
80F with significant medical history of HTN, folate deficiency anemia, GERD, LBP, COPD pressure ulcers, OA, ulcerative colitis, hypothyroidism is sent to ED from [facility] with c/o right eye swelling and dyspnea. Admitted to medicine for respiratory failure secondary to CHF exacerbation.  ICU was consulted for worsening shortness of breath  requiring BiPAP.      Assessment;  1. Acute on chronic Hypoxic respiratory failure with Hypercapnia  2. Encephalopathy   3 CHF exacerbation  4. COPD  5. Eye swelling  6.SAUL  7. Hypertension        Neuro:  # Encephalopathy  -Pt seems drowsy  l  CARDIOVASCULAR  #  PULMONARY  #  GASTROINTESTINAL  #  RENAL  #      INFECTIOUS DISEASE  #  ENDOCRINE  #  HEME  #    -Diet  PROPHYLAXIS  -DVT  -GI    DISPO  CODE STATUS 80F with significant medical history of HTN, folate deficiency anemia, GERD, LBP, COPD pressure ulcers, OA, ulcerative colitis, hypothyroidism is sent to ED from [facility] with c/o right eye swelling and dyspnea. Admitted to medicine for respiratory failure secondary to CHF exacerbation.  ICU was consulted for worsening shortness of breath  requiring BiPAP.      Assessment;  1. Acute on chronic Hypoxic respiratory failure with Hypercapnia  2. Encephalopathy   3 CHF exacerbation  4. COPD  5. Eye swelling  6.HOLA  7. Hypertension        Neuro:  # Encephalopathy  -Pt seems drowsy. Likely secondary to CO2 retention  l  CARDIOVASCULAR  # CHF  -Chest xray showed signs of congfestion  -Continue Lasix 40 daily  -Co2 retention noted on abg  -Will keep on BiPAP  -Avoid hyperoxia  -Strict Is and Os  -Daily weight    PULMONARY  #COPD  -Continue iv steroid  -Continue albuterol inhaler  -BiPAP for Co2 retention  -Avoid hyperoxia    GASTROINTESTINAL  No acute issues  Protonix for GI bprophylaxis    RENAL  #Hola  -Likely cardiorenal  -Resolved  -Monitor       INFECTIOUS DISEASE  #  ENDOCRINE  #Hypothyroidism  -NPO for now  -Resume Levothyroxine once diet is resumed        -Diet: NPO for now as pt is on BiPAP    PROPHYLAXIS  -DVT  -GI    DISPO : ICU    CODE STATUS: DNR/DNI 80F with significant medical history of HTN, folate deficiency anemia, GERD, LBP, COPD pressure ulcers, OA, ulcerative colitis, hypothyroidism is sent to ED from [facility] with c/o right eye swelling and dyspnea. Admitted to medicine for respiratory failure secondary to CHF exacerbation.  ICU was consulted for worsening shortness of breath  requiring BiPAP.      Assessment;  1. Acute on chronic Hypoxic respiratory failure with Hypercapnia  2. Encephalopathy   3 CHF exacerbation  4. COPD  5. Eye swelling  6.HOLA  7. Respiratory acidosis        Neuro:  # Encephalopathy  -Pt seems drowsy. Likely secondary to CO2 retention  l  CARDIOVASCULAR  # CHF  -Chest xray showed signs of congestion  -Elevated pro BNP 2025 noted  -Continue Lasix 40 daily  -Continue Metolazone  -Co2 retention noted on abg  -Will keep on BiPAP  -Avoid hyperoxia  -Strict Is and Os  -Daily weight          PULMONARY  #COPD  -Continue iv steroid  -Continue albuterol inhaler  -BiPAP for Co2 retention  -Avoid hyperoxia    GASTROINTESTINAL  No acute issues  Protonix for GI bprophylaxis    RENAL  #Hola  -Likely cardiorenal  -Resolved  -Monitor       INFECTIOUS DISEASE  -Pt afebrile  -WBC within normal limit    ENDOCRINE  #Hypothyroidism  -NPO for now  -Resume Levothyroxine once diet is resumed        -Diet: NPO for now as pt is on BiPAP    PROPHYLAXIS  -DVT: Heparin s/c for DVT prophylaxis  -GI: Protonix for GI prophylaxis    DISPO : ICU    CODE STATUS: DNR/DNI

## 2020-09-05 LAB
BASE EXCESS BLDA CALC-SCNC: 19.3 MMOL/L — HIGH (ref -2–2)
BASE EXCESS BLDA CALC-SCNC: 21 MMOL/L — HIGH (ref -2–2)
BLOOD GAS COMMENTS ARTERIAL: SIGNIFICANT CHANGE UP
BUN SERPL-MCNC: 35 MG/DL — HIGH (ref 7–18)
CALCIUM SERPL-MCNC: 8.9 MG/DL — SIGNIFICANT CHANGE UP (ref 8.4–10.5)
CHLORIDE SERPL-SCNC: 93 MMOL/L — LOW (ref 96–108)
CO2 SERPL-SCNC: 58 MMOL/L — CRITICAL HIGH (ref 22–31)
CREAT SERPL-MCNC: 1.14 MG/DL — SIGNIFICANT CHANGE UP (ref 0.5–1.3)
GLUCOSE BLDC GLUCOMTR-MCNC: 97 MG/DL — SIGNIFICANT CHANGE UP (ref 70–99)
GLUCOSE SERPL-MCNC: 69 MG/DL — LOW (ref 70–99)
HCO3 BLDA-SCNC: 48 MMOL/L — HIGH (ref 23–27)
HCO3 BLDA-SCNC: 49 MMOL/L — HIGH (ref 23–27)
HCT VFR BLD CALC: 37.1 % — SIGNIFICANT CHANGE UP (ref 34.5–45)
HGB BLD-MCNC: 11.1 G/DL — LOW (ref 11.5–15.5)
HOROWITZ INDEX BLDA+IHG-RTO: 40 — SIGNIFICANT CHANGE UP
MAGNESIUM SERPL-MCNC: 2.4 MG/DL — SIGNIFICANT CHANGE UP (ref 1.6–2.6)
MCHC RBC-ENTMCNC: 29.9 GM/DL — LOW (ref 32–36)
MCHC RBC-ENTMCNC: 31.2 PG — SIGNIFICANT CHANGE UP (ref 27–34)
MCV RBC AUTO: 104.2 FL — HIGH (ref 80–100)
NRBC # BLD: 0 /100 WBCS — SIGNIFICANT CHANGE UP (ref 0–0)
PCO2 BLDA: 65 MMHG — HIGH (ref 32–46)
PCO2 BLDA: 94 MMHG — CRITICAL HIGH (ref 32–46)
PH BLDA: 7.34 — LOW (ref 7.35–7.45)
PH BLDA: 7.48 — HIGH (ref 7.35–7.45)
PHOSPHATE SERPL-MCNC: 3.1 MG/DL — SIGNIFICANT CHANGE UP (ref 2.5–4.5)
PLATELET # BLD AUTO: 202 K/UL — SIGNIFICANT CHANGE UP (ref 150–400)
PO2 BLDA: 62 MMHG — LOW (ref 74–108)
PO2 BLDA: 70 MMHG — LOW (ref 74–108)
POTASSIUM SERPL-MCNC: 4.1 MMOL/L — SIGNIFICANT CHANGE UP (ref 3.5–5.3)
POTASSIUM SERPL-SCNC: 4.1 MMOL/L — SIGNIFICANT CHANGE UP (ref 3.5–5.3)
RBC # BLD: 3.56 M/UL — LOW (ref 3.8–5.2)
RBC # FLD: 13.7 % — SIGNIFICANT CHANGE UP (ref 10.3–14.5)
SAO2 % BLDA: 92 % — SIGNIFICANT CHANGE UP (ref 92–96)
SAO2 % BLDA: 96 % — SIGNIFICANT CHANGE UP (ref 92–96)
SARS-COV-2 IGG SERPL QL IA: POSITIVE
SARS-COV-2 IGM SERPL IA-ACNC: 22.1 INDEX — HIGH
SODIUM SERPL-SCNC: 145 MMOL/L — SIGNIFICANT CHANGE UP (ref 135–145)
WBC # BLD: 6.37 K/UL — SIGNIFICANT CHANGE UP (ref 3.8–10.5)
WBC # FLD AUTO: 6.37 K/UL — SIGNIFICANT CHANGE UP (ref 3.8–10.5)

## 2020-09-05 RX ORDER — AMLODIPINE BESYLATE 2.5 MG/1
2.5 TABLET ORAL DAILY
Refills: 0 | Status: DISCONTINUED | OUTPATIENT
Start: 2020-09-05 | End: 2020-09-06

## 2020-09-05 RX ORDER — HYDRALAZINE HCL 50 MG
5 TABLET ORAL ONCE
Refills: 0 | Status: COMPLETED | OUTPATIENT
Start: 2020-09-05 | End: 2020-09-05

## 2020-09-05 RX ADMIN — Medication 1 MILLIGRAM(S): at 12:27

## 2020-09-05 RX ADMIN — Medication 81 MILLIGRAM(S): at 12:10

## 2020-09-05 RX ADMIN — HEPARIN SODIUM 5000 UNIT(S): 5000 INJECTION INTRAVENOUS; SUBCUTANEOUS at 05:09

## 2020-09-05 RX ADMIN — Medication 1 DROP(S): at 05:10

## 2020-09-05 RX ADMIN — Medication 5 MILLIGRAM(S): at 06:09

## 2020-09-05 RX ADMIN — HEPARIN SODIUM 5000 UNIT(S): 5000 INJECTION INTRAVENOUS; SUBCUTANEOUS at 21:44

## 2020-09-05 RX ADMIN — Medication 40 MILLIGRAM(S): at 05:09

## 2020-09-05 RX ADMIN — CHLORHEXIDINE GLUCONATE 1 APPLICATION(S): 213 SOLUTION TOPICAL at 05:09

## 2020-09-05 RX ADMIN — Medication 40 MILLIGRAM(S): at 05:15

## 2020-09-05 RX ADMIN — Medication 2 DROP(S): at 18:00

## 2020-09-05 RX ADMIN — PANTOPRAZOLE SODIUM 40 MILLIGRAM(S): 20 TABLET, DELAYED RELEASE ORAL at 12:11

## 2020-09-05 RX ADMIN — Medication 2 DROP(S): at 05:09

## 2020-09-05 RX ADMIN — Medication 1 DROP(S): at 18:00

## 2020-09-05 RX ADMIN — ATORVASTATIN CALCIUM 40 MILLIGRAM(S): 80 TABLET, FILM COATED ORAL at 21:44

## 2020-09-05 RX ADMIN — HEPARIN SODIUM 5000 UNIT(S): 5000 INJECTION INTRAVENOUS; SUBCUTANEOUS at 14:14

## 2020-09-05 NOTE — DIETITIAN INITIAL EVALUATION ADULT. - PROBLEM SELECTOR PLAN 1
Acute on Chronic Hypercapnia  secondary to hyperoxia   was mildly dyspneic on admission, has decreased lung sounds b/l, wheezing +  ABG showed: 7.24/118/174/49 on 2L NC   CXR- worsened compared to prior, has small volumes, looks like ggo  Patient has h/o COPD, was a smoker, quit 2 yrs ago.  She could also have ARIN, given body habitus  She is a chronic CO2 retainer, was started on BIpap in ED due to increased bicarb, which could have worsened hypercapnia  Later was changed to NC, she improved clinically  Wells score is 3, PE unlikely,. Could not do Ct angio chest due to elevated creatinine.  Will do LE doppler to r/o DVT.   Recommend to keep her SPO2 around 92%, avoid over oxygenating as it may suppress respiratory drive.   Was started on albuterol, IV steroids  Monitor respiratory status  ICU was consulted, deferred admission at this point as she improved  pulmonology Dr ma

## 2020-09-05 NOTE — DIETITIAN INITIAL EVALUATION ADULT. - PROBLEM/PLAN-10
DISPLAY PLAN FREE TEXT bilaterally, likely pulmonary edema. Infection less likely. 2. Cardiomegaly. The central vessels are indistinct. There may be a small pleural effusion on the right. Signed by Dr Ar Velez on 5/31/2020 12:27 PM       Assessment     CHF (congestive heart failure), NYHA class IV, acute on chronic, systolic.  EF 69%. Continue supportive care. cards on board      Acute hypoxemic respiratory failure. remains on vent   Hold off on weaning at this point.     Acute kidney injury. Probable acute tubular necrosis. Plateaued. Nonoliguric. Continue to monitor, nephrology on board      Hospital-acquired pneumonia. Continue antibiotics.     Hypernatremia. Increase free water. He remains critically ill.   Family considering hospice       Epi Wood MD

## 2020-09-05 NOTE — DIETITIAN INITIAL EVALUATION ADULT. - PERTINENT LABORATORY DATA
09-05 Na145 mmol/L Glu 69 mg/dL<L> K+ 4.1 mmol/L Cr  1.14 mg/dL BUN 35 mg/dL<H> 09-05 Phos 3.1 mg/dL 09-04 Alb 3.4 g/dL<L> 09-04 Chol 290 mg/dL<H>  mg/dL HDL 91 mg/dL Trig 114 mg/dL

## 2020-09-05 NOTE — DIETITIAN INITIAL EVALUATION ADULT. - OTHER INFO
Patient reports adequate oral intake PTA, no information on wt loss available. Height from chart: 58#, BMI=36. allergy to milk and dairy products, orange juice, rice. No red meat, No chicken or fish as food preferences. communicated with kitchen. goals of care: No tube feeding

## 2020-09-05 NOTE — PHARMACOTHERAPY INTERVENTION NOTE - COMMENTS
Discussed on ICU rounds, patient has not required any therapy for itching, given patient's age, recommended discontinuing diphenhydramine order.

## 2020-09-05 NOTE — PROGRESS NOTE ADULT - ASSESSMENT
80F with significant medical history of HTN, folate deficiency anemia, GERD, LBP, COPD pressure ulcers, OA, ulcerative colitis, hypothyroidism is sent to ED from [facility] with c/o right eye swelling and dyspnea. Admitted to medicine for respiratory failure secondary to CHF exacerbation.  ICU was consulted for worsening shortness of breath  requiring BiPAP.      Assessment;  1. Acute on chronic Hypoxic respiratory failure with Hypercapnia  2. Encephalopathy   3 CHF exacerbation  4. COPD  5. Eye swelling  6.HOLA  7. Respiratory acidosis        Neuro:  # Encephalopathy  -Pt seems drowsy. Likely secondary to CO2 retention  l  CARDIOVASCULAR  # CHF  -Chest xray showed signs of congestion  -Elevated pro BNP 2025 noted  -Continue Lasix 40 daily  -Continue Metolazone  -Co2 retention noted on abg  -Will keep on BiPAP  -Avoid hyperoxia  -Strict Is and Os  -Daily weight          PULMONARY  #COPD  -Continue iv steroid  -Continue albuterol inhaler  -BiPAP for Co2 retention  -Avoid hyperoxia    GASTROINTESTINAL  No acute issues  Protonix for GI bprophylaxis    RENAL  #Hola  -Likely cardiorenal  -Resolved  -Monitor       INFECTIOUS DISEASE  -Pt afebrile  -WBC within normal limit    ENDOCRINE  #Hypothyroidism  -NPO for now  -Resume Levothyroxine once diet is resumed        -Diet: NPO for now as pt is on BiPAP    PROPHYLAXIS  -DVT: Heparin s/c for DVT prophylaxis  -GI: Protonix for GI prophylaxis    DISPO : ICU    CODE STATUS: DNR/DNI 80F with significant medical history of HTN, folate deficiency anemia, GERD, LBP, COPD pressure ulcers, OA, ulcerative colitis, hypothyroidism is sent to ED from [facility] with c/o right eye swelling and dyspnea. Admitted to medicine for respiratory failure secondary to CHF exacerbation.  ICU was consulted for worsening shortness of breath  requiring BiPAP.      Assessment;  1. Acute on chronic Hypoxic respiratory failure with Hypercapnia  2. Encephalopathy   3 CHF exacerbation  4. COPD  5. Eye swelling  6.HOLA  7. Respiratory acidosis        Neuro:  # Encephalopathy  -Pt seems drowsy. Likely secondary to CO2 retention  l  CARDIOVASCULAR  # CHF  -Chest xray showed signs of congestion  -Elevated pro BNP 2025 noted  -Continue Lasix 40 daily  -Continue Metolazone  -Co2 retention noted on abg  -Will keep on BiPAP  -Avoid hyperoxia  -Strict Is and Os  -Daily weight    PULMONARY  #COPD  -Continue iv steroid  -Continue albuterol inhaler  -BiPAP for Co2 retention  -Avoid hyperoxia    GASTROINTESTINAL  No acute issues  Protonix for GI bprophylaxis    RENAL  #Hola  -Likely cardiorenal  -Resolved  -Monitor       INFECTIOUS DISEASE  -Pt afebrile  -WBC within normal limit    ENDOCRINE  #Hypothyroidism  -NPO for now  -Resume Levothyroxine once diet is resumed        -Diet: NPO for now as pt is on BiPAP    PROPHYLAXIS  -DVT: Heparin s/c for DVT prophylaxis  -GI: Protonix for GI prophylaxis    DISPO : ICU    CODE STATUS: DNR/DNI

## 2020-09-05 NOTE — PROGRESS NOTE ADULT - SUBJECTIVE AND OBJECTIVE BOX
Patient is a 80y old  Female who presents with a chief complaint of Dyspnea (05 Sep 2020 01:11)    PATIENT IS SEEN AND EXAMINED IN MEDICAL FLOOR.    ALLERGIES:  iodine containing compounds (Unknown)  Milk (Unknown)  morphine (Unknown)  Orange Juice (Unknown)  Rice (Unknown)      Daily     Daily Weight in k.7 (05 Sep 2020 17:21)    VITALS:    Vital Signs Last 24 Hrs  T(C): 36.8 (05 Sep 2020 16:00), Max: 37.1 (05 Sep 2020 12:00)  T(F): 98.3 (05 Sep 2020 16:00), Max: 98.7 (05 Sep 2020 12:00)  HR: 101 (05 Sep 2020 18:00) (65 - 114)  BP: 137/79 (05 Sep 2020 18:00) (107/58 - 194/101)  BP(mean): 95 (05 Sep 2020 18:00) (73 - 120)  RR: 24 (05 Sep 2020 18:00) (12 - 34)  SpO2: 96% (05 Sep 2020 18:00) (92% - 100%)    LABS:    CBC Full  -  ( 05 Sep 2020 06:58 )  WBC Count : 6.37 K/uL  RBC Count : 3.56 M/uL  Hemoglobin : 11.1 g/dL  Hematocrit : 37.1 %  Platelet Count - Automated : 202 K/uL  Mean Cell Volume : 104.2 fl  Mean Cell Hemoglobin : 31.2 pg  Mean Cell Hemoglobin Concentration : 29.9 gm/dL  Auto Neutrophil # : x  Auto Lymphocyte # : x  Auto Monocyte # : x  Auto Eosinophil # : x  Auto Basophil # : x  Auto Neutrophil % : x  Auto Lymphocyte % : x  Auto Monocyte % : x  Auto Eosinophil % : x  Auto Basophil % : x    PT/INR - ( 04 Sep 2020 16:06 )   PT: 13.1 sec;   INR: 1.13 ratio      09    145  |  93<L>  |  35<H>  ----------------------------<  69<L>  4.1   |  58<HH>  |  1.14    Ca    8.9      05 Sep 2020 06:58  Phos  3.1     09-05  Mg     2.4     09-05    TPro  7.6  /  Alb  3.4<L>  /  TBili  0.5  /  DBili  x   /  AST  16  /  ALT  16  /  AlkPhos  74  09-04    CAPILLARY BLOOD GLUCOSE    LIVER FUNCTIONS - ( 04 Sep 2020 06:50 )  Alb: 3.4 g/dL / Pro: 7.6 g/dL / ALK PHOS: 74 U/L / ALT: 16 U/L DA / AST: 16 U/L / GGT: x           Creatinine Trend: 1.14<--, 1.09<--, 1.14<--, 1.33<--  I&O's Summary    04 Sep 2020 07:01  -  05 Sep 2020 07:00  --------------------------------------------------------  IN: 0 mL / OUT: 400 mL / NET: -400 mL    05 Sep 2020 07:01  -  05 Sep 2020 18:08  --------------------------------------------------------  IN: 500 mL / OUT: 600 mL / NET: -100 mL    ABG - ( 05 Sep 2020 08:29 )  pH, Arterial: 7.48  pH, Blood: x     /  pCO2: 65    /  pO2: 70    / HCO3: 48    / Base Excess: 21.0  /  SaO2: 96        .Blood Blood-Peripheral   @ 09:52   No Growth Final  --  --      .Urine Clean Catch (Midstream)   @ 01:00   No growth  --  --    MEDICATIONS:    MEDICATIONS  (STANDING):  ALBUTerol    90 MICROgram(s) HFA Inhaler 1 Puff(s) Inhalation every 4 hours  amLODIPine   Tablet 2.5 milliGRAM(s) Oral daily  artificial  tears Solution 1 Drop(s) Both EYES two times a day  aspirin enteric coated 81 milliGRAM(s) Oral daily  atorvastatin 40 milliGRAM(s) Oral at bedtime  chlorhexidine 2% Cloths 1 Application(s) Topical <User Schedule>  ciprofloxacin  0.3% Ophthalmic Solution 2 Drop(s) Right EYE two times a day  folic acid 1 milliGRAM(s) Oral daily  furosemide   Injectable 40 milliGRAM(s) IV Push daily  heparin   Injectable 5000 Unit(s) SubCutaneous every 8 hours  hydrocortisone 2.5% Rectal Cream 1 Application(s) Rectal at bedtime  influenza   Vaccine 0.5 milliLiter(s) IntraMuscular once  levothyroxine 100 MICROGram(s) Oral daily  methylPREDNISolone sodium succinate Injectable 40 milliGRAM(s) IV Push daily  metolazone 5 milliGRAM(s) Oral daily  pantoprazole  Injectable 40 milliGRAM(s) IV Push daily  senna 2 Tablet(s) Oral at bedtime      MEDICATIONS  (PRN):      REVIEW OF SYSTEMS:                           ALL ROS DONE [ X   ]    CONSTITUTIONAL:  LETHARGIC [   ], FEVER [   ], UNRESPONSIVE [   ]  CVS:  CP  [   ], SOB, [   ], PALPITATIONS [   ], DIZZYNESS [   ]  RS: COUGH [   ], SPUTUM [   ]  GI: ABDOMINAL PAIN [   ], NAUSEA [   ], VOMITINGS [   ], DIARRHEA [   ], CONSTIPATION [   ]  :  DYSURIA [   ], NOCTURIA [   ], INCREASED FREQUENCY [   ], DRIBLING [   ],  SKELETAL: PAINFUL JOINTS [   ], SWOLLEN JOINTS [   ], NECK ACHE [   ], LOW BACK ACHE [   ],  SKIN : ULCERS [   ], RASH [   ], ITCHING [   ]  CNS: HEAD ACHE [   ], DOUBLE VISION [   ], BLURRED VISION [   ], AMS / CONFUSION [   ], SEIZURES [   ], WEAKNESS [   ],TINGLING / NUMBNESS [   ]    PHYSICAL EXAMINATION:  GENERAL APPEARANCE: NO DISTRESS  HEENT:  NO PALLOR, NO  JVD,  NO NODES, NECK SUPPLE   BILATERAL EYES WITH CRUSTING  CVS: S1 +, S2 +,   RS: AEEB,  OCCASIONAL  RALES +,   NO RONCHI  ABD: SOFT, NT, NO, BS +  EXT: PE  SKIN: WARM, DRY  SKELETAL:  ROM ACCEPTABLE  CNS:  AAO X 2 ,   DEFICITS    RADIOLOGY :    < from: Xray Chest 1 View-PORTABLE IMMEDIATE (20 @ 18:02) >  INTERPRETATION:  AP semisupine chest on September 3, 2020 at 5:55 PM. Patient has increasing CO2 level. Patient was positive for the Covid virus on  with negative tests up to . Patient has facial swelling possibly from allergic reaction. Patient has renal disease and respiratory failure. Patient has COPD. Patient has hypertension. Patient's chin obscures the apices.    Shallow inspiration crowds the chest.    Heart may be enlarged.    There is a mild to moderate central congestion in the lungs increased from .    IMPRESSION: Increasing CHF.    < end of copied text >      < from: US Duplex Venous Lower Ext Complete, Bilateral (20 @ 13:13) >    IMPRESSION:  No evidence of deep venous thrombosis in either lower extremity.    < end of copied text >      ASSESSMENT :     Other abnormality of breathing  Yes  Pressure ulcer  Pain, low back  Ulcerative colitis  Osteoarthritis  Hypothyroidism  GERD (gastroesophageal reflux disease)  Hypertension  History of ankle surgery  H/O: hysterectomy      PLAN:  HPI:  80F with significant medical history of HTN, folate deficiency anemia, GERD, LBP, COPD pressure ulcers, OA, ulcerative colitis, hypothyroidism is sent to ED from [facility] with c/o right eye swelling and dyspnea. Patient reports that her right eye selling has worsened from before. She has increased lacrimal discharge from both eyes. She also has difficulty breathing which has been worsening. She has been sitting up all day and night as she is having difficulty breathing. She states she feels comfortable sitting up with pillow at her back to support her. But says she got used to the dyspnea. She denies any eye pain. She reports that she has had this swelling before and was told to wear an eye patch which helped but she did not wear it this time. She denies fever, chest pain, nausea, vomiting, headache or other symptoms. (04 Sep 2020 01:18)      # ACUTE ON CHRONIC HYPOXIC HYPERCAPNIC RESPIRATORY FAILURE S/T SUSPECTED ARIN/OHS W/ COPD AND SUSPECTED CHF [ELEVATED PRO-BNP] - S/P DOSE OF IV LASIX, ON BIPAP OVERNIGHT IN ICU WITH IMPROVEMENT, LASIX 40MG PO QDAY, METOLAZONE, F/U ECHOCARDIOGRAM, STRICT IS AND OS, PULMONOLOGY CONSULT IN PROGRESS  # SUSPECT COPD EXACERBATION - PLACED ON IV STEROIDS, ALBUTEROL INHALER  # ACUTE METABOLIC ENCEPHALOPATHY - IMPROVED - SUSPECTED S/T HYPERCAPNIA - MONITOR  # SUSPECTED CONJUNCTIVITIS - ON ERYTHROMYCIN DROPS  # ULCERATIVE COLITIS  # HYPOTHYROIDISM - RESUME LEVOTHYROXINE  # HTN  # CHRONIC MACROCYTIC ANEMIA   # OA  # GI AND DVT PPX    NADINE MONTOYA M.D. COVERING LETHA MONTOYA M.D.

## 2020-09-05 NOTE — PROGRESS NOTE ADULT - SUBJECTIVE AND OBJECTIVE BOX
Patient is a 80y old  Female who presents with a chief complaint of Dyspnea (04 Sep 2020 16:36)      INTERVAL HPI/OVERNIGHT EVENTS:  Patient maintained on BiPAP overnight with improvement in acidosis.    ICU Vital Signs Last 24 Hrs  T(C): 36.1 (04 Sep 2020 21:10), Max: 37 (04 Sep 2020 02:35)  T(F): 97 (04 Sep 2020 21:10), Max: 98.6 (04 Sep 2020 02:35)  HR: 66 (05 Sep 2020 01:00) (66 - 103)  BP: 155/79 (05 Sep 2020 01:00) (112/72 - 164/91)  BP(mean): 102 (05 Sep 2020 01:00) (85 - 114)  ABP: --  ABP(mean): --  RR: 14 (05 Sep 2020 01:00) (12 - 25)  SpO2: 97% (05 Sep 2020 01:00) (93% - 100%)    I&O's Summary        LABS:                        10.9   6.06  )-----------( 198      ( 04 Sep 2020 15:13 )             39.4     09-04    142  |  94<L>  |  29<H>  ----------------------------<  120<H>  4.6   |  44  |  1.09    Ca    8.7      04 Sep 2020 06:50  Phos  3.6     09-04  Mg     2.4     09-04    TPro  7.6  /  Alb  3.4<L>  /  TBili  0.5  /  DBili  x   /  AST  16  /  ALT  16  /  AlkPhos  74  09-04    PT/INR - ( 04 Sep 2020 16:06 )   PT: 13.1 sec;   INR: 1.13 ratio             CAPILLARY BLOOD GLUCOSE        ABG - ( 05 Sep 2020 00:31 )  pH, Arterial: 7.34  pH, Blood: x     /  pCO2: 94    /  pO2: 62    / HCO3: 49    / Base Excess: 19.3  /  SaO2: 92                  RADIOLOGY & ADDITIONAL TESTS:    Consultant(s) Notes Reviewed:  [x ] YES  [ ] NO    MEDICATIONS  (STANDING):  ALBUTerol    90 MICROgram(s) HFA Inhaler 1 Puff(s) Inhalation every 4 hours  artificial  tears Solution 1 Drop(s) Both EYES two times a day  aspirin enteric coated 81 milliGRAM(s) Oral daily  atorvastatin 40 milliGRAM(s) Oral at bedtime  chlorhexidine 2% Cloths 1 Application(s) Topical <User Schedule>  ciprofloxacin  0.3% Ophthalmic Solution 2 Drop(s) Right EYE two times a day  folic acid 1 milliGRAM(s) Oral daily  furosemide   Injectable 40 milliGRAM(s) IV Push daily  heparin   Injectable 5000 Unit(s) SubCutaneous every 8 hours  hydrocortisone 2.5% Rectal Cream 1 Application(s) Rectal at bedtime  influenza   Vaccine 0.5 milliLiter(s) IntraMuscular once  levothyroxine 100 MICROGram(s) Oral daily  methylPREDNISolone sodium succinate Injectable 40 milliGRAM(s) IV Push daily  metolazone 5 milliGRAM(s) Oral daily  pantoprazole  Injectable 40 milliGRAM(s) IV Push daily  senna 2 Tablet(s) Oral at bedtime    MEDICATIONS  (PRN):  diphenhydrAMINE   Injectable 25 milliGRAM(s) IV Push at bedtime PRN Rash and/or Itching      PHYSICAL EXAM:  EYES: EOMI, PERRLA, conjunctiva and sclera clear  ENMT: No tonsillar erythema, exudates, or enlargement; Moist mucous membranes, Good dentition, No lesions  NECK: Supple, No JVD, Normal thyroid  NERVOUS SYSTEM:  Pt seems drowsy. No focal deficit  CHEST/LUNG: Clear to percussion bilaterally; No rales, rhonchi, wheezing, or rubs  HEART: Regular rate and rhythm; No murmurs, rubs, or gallops  ABDOMEN: Soft, Nontender, Nondistended; Bowel sounds present  EXTREMITIES:  2+ Peripheral Pulses, No clubbing, cyanosis, or edema  LYMPH: No lymphadenopathy noted  SKIN: No rashes or lesions    Care Discussed with Consultants/Other Providers [ x] YES  [ ] NO 80F with significant medical history of HTN, folate deficiency anemia, GERD, LBP, COPD pressure ulcers, OA, ulcerative colitis, hypothyroidism is sent to ED from [facility] with c/o right eye swelling and dyspnea. Patient reports that her right eye selling has worsened from before. She has increased lacrimal discharge from both eyes. She also has difficulty breathing which has been worsening. She has been sitting up all day and night as she is having difficulty breathing. She states she feels comfortable sitting up with pillow at her back to support her. But says she got used to the dyspnea. She denies any eye pain. She reports that she has had this swelling before and was told to wear an eye patch which helped but she did not wear it this time. She denies fever, chest pain, nausea, vomiting, headache or other symptoms.       INTERVAL HPI/OVERNIGHT EVENTS:  Patient maintained on BiPAP overnight with improvement in acidosis. She was transitioned to Bipap HS, and started on a diet. She is saturating well on 3L NC.     ICU Vital Signs Last 24 Hrs  T(C): 36.1 (04 Sep 2020 21:10), Max: 37 (04 Sep 2020 02:35)  T(F): 97 (04 Sep 2020 21:10), Max: 98.6 (04 Sep 2020 02:35)  HR: 66 (05 Sep 2020 01:00) (66 - 103)  BP: 155/79 (05 Sep 2020 01:00) (112/72 - 164/91)  BP(mean): 102 (05 Sep 2020 01:00) (85 - 114)  ABP: --  ABP(mean): --  RR: 14 (05 Sep 2020 01:00) (12 - 25)  SpO2: 97% (05 Sep 2020 01:00) (93% - 100%)    I&O's Summary        LABS:                        10.9   6.06  )-----------( 198      ( 04 Sep 2020 15:13 )             39.4     09-04    142  |  94<L>  |  29<H>  ----------------------------<  120<H>  4.6   |  44  |  1.09    Ca    8.7      04 Sep 2020 06:50  Phos  3.6     09-04  Mg     2.4     09-04    TPro  7.6  /  Alb  3.4<L>  /  TBili  0.5  /  DBili  x   /  AST  16  /  ALT  16  /  AlkPhos  74  09-04    PT/INR - ( 04 Sep 2020 16:06 )   PT: 13.1 sec;   INR: 1.13 ratio             CAPILLARY BLOOD GLUCOSE        ABG - ( 05 Sep 2020 00:31 )  pH, Arterial: 7.34  pH, Blood: x     /  pCO2: 94    /  pO2: 62    / HCO3: 49    / Base Excess: 19.3  /  SaO2: 92                  RADIOLOGY & ADDITIONAL TESTS:    Consultant(s) Notes Reviewed:  [x ] YES  [ ] NO    MEDICATIONS  (STANDING):  ALBUTerol    90 MICROgram(s) HFA Inhaler 1 Puff(s) Inhalation every 4 hours  artificial  tears Solution 1 Drop(s) Both EYES two times a day  aspirin enteric coated 81 milliGRAM(s) Oral daily  atorvastatin 40 milliGRAM(s) Oral at bedtime  chlorhexidine 2% Cloths 1 Application(s) Topical <User Schedule>  ciprofloxacin  0.3% Ophthalmic Solution 2 Drop(s) Right EYE two times a day  folic acid 1 milliGRAM(s) Oral daily  furosemide   Injectable 40 milliGRAM(s) IV Push daily  heparin   Injectable 5000 Unit(s) SubCutaneous every 8 hours  hydrocortisone 2.5% Rectal Cream 1 Application(s) Rectal at bedtime  influenza   Vaccine 0.5 milliLiter(s) IntraMuscular once  levothyroxine 100 MICROGram(s) Oral daily  methylPREDNISolone sodium succinate Injectable 40 milliGRAM(s) IV Push daily  metolazone 5 milliGRAM(s) Oral daily  pantoprazole  Injectable 40 milliGRAM(s) IV Push daily  senna 2 Tablet(s) Oral at bedtime    MEDICATIONS  (PRN):  diphenhydrAMINE   Injectable 25 milliGRAM(s) IV Push at bedtime PRN Rash and/or Itching      PHYSICAL EXAM:  EYES: EOMI, PERRLA, conjunctiva and sclera clear  ENMT: No tonsillar erythema, exudates, or enlargement; Moist mucous membranes, Good dentition, No lesions  NECK: Supple, No JVD, Normal thyroid  NERVOUS SYSTEM:  Pt seems drowsy. No focal deficit  CHEST/LUNG: Clear to percussion bilaterally; No rales, rhonchi, wheezing, or rubs  HEART: Regular rate and rhythm; No murmurs, rubs, or gallops  ABDOMEN: Soft, Nontender, Nondistended; Bowel sounds present  EXTREMITIES:  2+ Peripheral Pulses, No clubbing, cyanosis, or edema  LYMPH: No lymphadenopathy noted  SKIN: No rashes or lesions    Care Discussed with Consultants/Other Providers [ x] YES  [ ] NO

## 2020-09-06 LAB
ANION GAP SERPL CALC-SCNC: -17 MMOL/L — LOW (ref 5–17)
APTT BLD: 60.1 SEC — HIGH (ref 27.5–35.5)
BUN SERPL-MCNC: 40 MG/DL — HIGH (ref 7–18)
CALCIUM SERPL-MCNC: 9 MG/DL — SIGNIFICANT CHANGE UP (ref 8.4–10.5)
CHLORIDE SERPL-SCNC: 92 MMOL/L — LOW (ref 96–108)
CO2 SERPL-SCNC: 67 MMOL/L — CRITICAL HIGH (ref 22–31)
CREAT SERPL-MCNC: 1.22 MG/DL — SIGNIFICANT CHANGE UP (ref 0.5–1.3)
GLUCOSE BLDC GLUCOMTR-MCNC: 72 MG/DL — SIGNIFICANT CHANGE UP (ref 70–99)
GLUCOSE SERPL-MCNC: 89 MG/DL — SIGNIFICANT CHANGE UP (ref 70–99)
HCT VFR BLD CALC: 38.1 % — SIGNIFICANT CHANGE UP (ref 34.5–45)
HGB BLD-MCNC: 11.3 G/DL — LOW (ref 11.5–15.5)
INR BLD: 1.04 RATIO — SIGNIFICANT CHANGE UP (ref 0.88–1.16)
MAGNESIUM SERPL-MCNC: 2.4 MG/DL — SIGNIFICANT CHANGE UP (ref 1.6–2.6)
MCHC RBC-ENTMCNC: 29.7 GM/DL — LOW (ref 32–36)
MCHC RBC-ENTMCNC: 29.8 PG — SIGNIFICANT CHANGE UP (ref 27–34)
MCV RBC AUTO: 100.5 FL — HIGH (ref 80–100)
NRBC # BLD: 0 /100 WBCS — SIGNIFICANT CHANGE UP (ref 0–0)
PHOSPHATE SERPL-MCNC: 2.6 MG/DL — SIGNIFICANT CHANGE UP (ref 2.5–4.5)
PLATELET # BLD AUTO: 227 K/UL — SIGNIFICANT CHANGE UP (ref 150–400)
POTASSIUM SERPL-MCNC: 4.1 MMOL/L — SIGNIFICANT CHANGE UP (ref 3.5–5.3)
POTASSIUM SERPL-SCNC: 4.1 MMOL/L — SIGNIFICANT CHANGE UP (ref 3.5–5.3)
PROTHROM AB SERPL-ACNC: 12.1 SEC — SIGNIFICANT CHANGE UP (ref 10.6–13.6)
RBC # BLD: 3.79 M/UL — LOW (ref 3.8–5.2)
RBC # FLD: 15.1 % — HIGH (ref 10.3–14.5)
SODIUM SERPL-SCNC: 142 MMOL/L — SIGNIFICANT CHANGE UP (ref 135–145)
WBC # BLD: 8.4 K/UL — SIGNIFICANT CHANGE UP (ref 3.8–10.5)
WBC # FLD AUTO: 8.4 K/UL — SIGNIFICANT CHANGE UP (ref 3.8–10.5)

## 2020-09-06 RX ORDER — AMLODIPINE BESYLATE 2.5 MG/1
5 TABLET ORAL DAILY
Refills: 0 | Status: DISCONTINUED | OUTPATIENT
Start: 2020-09-06 | End: 2020-09-08

## 2020-09-06 RX ORDER — HYDRALAZINE HCL 50 MG
5 TABLET ORAL ONCE
Refills: 0 | Status: COMPLETED | OUTPATIENT
Start: 2020-09-06 | End: 2020-09-06

## 2020-09-06 RX ADMIN — Medication 40 MILLIGRAM(S): at 05:05

## 2020-09-06 RX ADMIN — ATORVASTATIN CALCIUM 40 MILLIGRAM(S): 80 TABLET, FILM COATED ORAL at 22:10

## 2020-09-06 RX ADMIN — AMLODIPINE BESYLATE 5 MILLIGRAM(S): 2.5 TABLET ORAL at 06:28

## 2020-09-06 RX ADMIN — CHLORHEXIDINE GLUCONATE 1 APPLICATION(S): 213 SOLUTION TOPICAL at 05:07

## 2020-09-06 RX ADMIN — Medication 100 MICROGRAM(S): at 05:06

## 2020-09-06 RX ADMIN — AMLODIPINE BESYLATE 2.5 MILLIGRAM(S): 2.5 TABLET ORAL at 05:06

## 2020-09-06 RX ADMIN — Medication 81 MILLIGRAM(S): at 12:00

## 2020-09-06 RX ADMIN — HEPARIN SODIUM 5000 UNIT(S): 5000 INJECTION INTRAVENOUS; SUBCUTANEOUS at 05:06

## 2020-09-06 RX ADMIN — Medication 1 MILLIGRAM(S): at 12:00

## 2020-09-06 RX ADMIN — PANTOPRAZOLE SODIUM 40 MILLIGRAM(S): 20 TABLET, DELAYED RELEASE ORAL at 12:00

## 2020-09-06 RX ADMIN — Medication 2 DROP(S): at 18:02

## 2020-09-06 RX ADMIN — Medication 2 DROP(S): at 05:14

## 2020-09-06 RX ADMIN — Medication 1 DROP(S): at 18:02

## 2020-09-06 NOTE — PROGRESS NOTE ADULT - SUBJECTIVE AND OBJECTIVE BOX
80F with significant medical history of HTN, folate deficiency anemia, GERD, LBP, COPD pressure ulcers, OA, ulcerative colitis, hypothyroidism is sent to ED from [facility] with c/o right eye swelling and dyspnea. Patient reports that her right eye selling has worsened from before. She has increased lacrimal discharge from both eyes. She also has difficulty breathing which has been worsening. She has been sitting up all day and night as she is having difficulty breathing. She states she feels comfortable sitting up with pillow at her back to support her. But says she got used to the dyspnea. She denies any eye pain. She reports that she has had this swelling before and was told to wear an eye patch which helped but she did not wear it this time. She denies fever, chest pain, nausea, vomiting, headache or other symptoms.     INTERVAL HPI/OVERNIGHT EVENTS: NAEON. Patient refused BiPAP overnight 2/2 discomfort.     ICU Vital Signs Last 24 Hrs  T(C): 36.8 (05 Sep 2020 16:00), Max: 37.1 (05 Sep 2020 12:00)  T(F): 98.3 (05 Sep 2020 16:00), Max: 98.7 (05 Sep 2020 12:00)  HR: 90 (06 Sep 2020 00:10) (65 - 114)  BP: 145/77 (05 Sep 2020 23:00) (107/58 - 194/101)  BP(mean): 98 (05 Sep 2020 23:00) (73 - 120)  ABP: --  ABP(mean): --  RR: 21 (05 Sep 2020 23:00) (12 - 34)  SpO2: 98% (06 Sep 2020 00:10) (92% - 100%)    I&O's Summary    04 Sep 2020 07:01  -  05 Sep 2020 07:00  --------------------------------------------------------  IN: 0 mL / OUT: 400 mL / NET: -400 mL    05 Sep 2020 07:01  -  06 Sep 2020 00:23  --------------------------------------------------------  IN: 500 mL / OUT: 600 mL / NET: -100 mL      LABS:                        11.1   6.37  )-----------( 202      ( 05 Sep 2020 06:58 )             37.1     09-05    145  |  93<L>  |  35<H>  ----------------------------<  69<L>  4.1   |  58<HH>  |  1.14    Ca    8.9      05 Sep 2020 06:58  Phos  3.1     09-05  Mg     2.4     09-05    TPro  7.6  /  Alb  3.4<L>  /  TBili  0.5  /  DBili  x   /  AST  16  /  ALT  16  /  AlkPhos  74  09-04    PT/INR - ( 04 Sep 2020 16:06 )   PT: 13.1 sec;   INR: 1.13 ratio             CAPILLARY BLOOD GLUCOSE      POCT Blood Glucose.: 97 mg/dL (05 Sep 2020 20:14)    ABG - ( 05 Sep 2020 08:29 )  pH, Arterial: 7.48  pH, Blood: x     /  pCO2: 65    /  pO2: 70    / HCO3: 48    / Base Excess: 21.0  /  SaO2: 96                  RADIOLOGY & ADDITIONAL TESTS:    Consultant(s) Notes Reviewed:  [x ] YES  [ ] NO    MEDICATIONS  (STANDING):  ALBUTerol    90 MICROgram(s) HFA Inhaler 1 Puff(s) Inhalation every 4 hours  amLODIPine   Tablet 2.5 milliGRAM(s) Oral daily  artificial  tears Solution 1 Drop(s) Both EYES two times a day  aspirin enteric coated 81 milliGRAM(s) Oral daily  atorvastatin 40 milliGRAM(s) Oral at bedtime  chlorhexidine 2% Cloths 1 Application(s) Topical <User Schedule>  ciprofloxacin  0.3% Ophthalmic Solution 2 Drop(s) Right EYE two times a day  folic acid 1 milliGRAM(s) Oral daily  furosemide   Injectable 40 milliGRAM(s) IV Push daily  heparin   Injectable 5000 Unit(s) SubCutaneous every 8 hours  hydrocortisone 2.5% Rectal Cream 1 Application(s) Rectal at bedtime  influenza   Vaccine 0.5 milliLiter(s) IntraMuscular once  levothyroxine 100 MICROGram(s) Oral daily  methylPREDNISolone sodium succinate Injectable 40 milliGRAM(s) IV Push daily  metolazone 5 milliGRAM(s) Oral daily  pantoprazole  Injectable 40 milliGRAM(s) IV Push daily  senna 2 Tablet(s) Oral at bedtime    MEDICATIONS  (PRN):    PHYSICAL EXAM:  EYES: EOMI, PERRLA, conjunctiva and sclera clear  ENMT: No tonsillar erythema, exudates, or enlargement; Moist mucous membranes, Good dentition, No lesions  NECK: Supple, No JVD, Normal thyroid  NERVOUS SYSTEM:  Pt seems drowsy. No focal deficit  CHEST/LUNG: Clear to percussion bilaterally; No rales, rhonchi, wheezing, or rubs  HEART: Regular rate and rhythm; No murmurs, rubs, or gallops  ABDOMEN: Soft, Nontender, Nondistended; Bowel sounds present  EXTREMITIES:  2+ Peripheral Pulses, No clubbing, cyanosis, or edema  LYMPH: No lymphadenopathy noted  SKIN: No rashes or lesions      Care Discussed with Consultants/Other Providers [ x] YES  [ ] NO 80F with significant medical history of HTN, folate deficiency anemia, GERD, LBP, COPD pressure ulcers, OA, ulcerative colitis, hypothyroidism is sent to ED from [facility] with c/o right eye swelling and dyspnea. Patient reports that her right eye selling has worsened from before. She has increased lacrimal discharge from both eyes. She also has difficulty breathing which has been worsening. She has been sitting up all day and night as she is having difficulty breathing. She states she feels comfortable sitting up with pillow at her back to support her. But says she got used to the dyspnea. She denies any eye pain. She reports that she has had this swelling before and was told to wear an eye patch which helped but she did not wear it this time. She denies fever, chest pain, nausea, vomiting, headache or other symptoms.     INTERVAL HPI/OVERNIGHT EVENTS: NAEON. Elevated BPs to 190s early am, gave additional 5mg Norvasc and increased daily dose to 5mg. VS otherwise wnl. Patient refused BiPAP overnight 2/2 discomfort. On examination this am patient sitting hunched in bed. Reports feeling well other than ongoing swelling in her right eyelid. Denies SOB, cough, or pain. Continuing cipro eye drops. On albuterol and solumedrol 40mg Qd.     ICU Vital Signs Last 24 Hrs  T(C): 36.8 (05 Sep 2020 16:00), Max: 37.1 (05 Sep 2020 12:00)  T(F): 98.3 (05 Sep 2020 16:00), Max: 98.7 (05 Sep 2020 12:00)  HR: 90 (06 Sep 2020 00:10) (65 - 114)  BP: 145/77 (05 Sep 2020 23:00) (107/58 - 194/101)  BP(mean): 98 (05 Sep 2020 23:00) (73 - 120)  ABP: --  ABP(mean): --  RR: 21 (05 Sep 2020 23:00) (12 - 34)  SpO2: 98% (06 Sep 2020 00:10) (92% - 100%)    I&O's Summary    04 Sep 2020 07:01  -  05 Sep 2020 07:00  --------------------------------------------------------  IN: 0 mL / OUT: 400 mL / NET: -400 mL    05 Sep 2020 07:01  -  06 Sep 2020 00:23  --------------------------------------------------------  IN: 500 mL / OUT: 600 mL / NET: -100 mL      LABS:                        11.1   6.37  )-----------( 202      ( 05 Sep 2020 06:58 )             37.1     09-05    145  |  93<L>  |  35<H>  ----------------------------<  69<L>  4.1   |  58<HH>  |  1.14    Ca    8.9      05 Sep 2020 06:58  Phos  3.1     09-05  Mg     2.4     09-05    TPro  7.6  /  Alb  3.4<L>  /  TBili  0.5  /  DBili  x   /  AST  16  /  ALT  16  /  AlkPhos  74  09-04    PT/INR - ( 04 Sep 2020 16:06 )   PT: 13.1 sec;   INR: 1.13 ratio             CAPILLARY BLOOD GLUCOSE      POCT Blood Glucose.: 97 mg/dL (05 Sep 2020 20:14)    ABG - ( 05 Sep 2020 08:29 )  pH, Arterial: 7.48  pH, Blood: x     /  pCO2: 65    /  pO2: 70    / HCO3: 48    / Base Excess: 21.0  /  SaO2: 96            RADIOLOGY & ADDITIONAL TESTS:    Consultant(s) Notes Reviewed:  [x ] YES  [ ] NO    MEDICATIONS  (STANDING):  ALBUTerol    90 MICROgram(s) HFA Inhaler 1 Puff(s) Inhalation every 4 hours  amLODIPine   Tablet 2.5 milliGRAM(s) Oral daily  artificial  tears Solution 1 Drop(s) Both EYES two times a day  aspirin enteric coated 81 milliGRAM(s) Oral daily  atorvastatin 40 milliGRAM(s) Oral at bedtime  chlorhexidine 2% Cloths 1 Application(s) Topical <User Schedule>  ciprofloxacin  0.3% Ophthalmic Solution 2 Drop(s) Right EYE two times a day  folic acid 1 milliGRAM(s) Oral daily  furosemide   Injectable 40 milliGRAM(s) IV Push daily  heparin   Injectable 5000 Unit(s) SubCutaneous every 8 hours  hydrocortisone 2.5% Rectal Cream 1 Application(s) Rectal at bedtime  influenza   Vaccine 0.5 milliLiter(s) IntraMuscular once  levothyroxine 100 MICROGram(s) Oral daily  methylPREDNISolone sodium succinate Injectable 40 milliGRAM(s) IV Push daily  metolazone 5 milliGRAM(s) Oral daily  pantoprazole  Injectable 40 milliGRAM(s) IV Push daily  senna 2 Tablet(s) Oral at bedtime    MEDICATIONS  (PRN):    PHYSICAL EXAM:  EYES: EOMI, PERRLA, right lid swollen   ENMT: oral mucosa moist  NECK: Supple  NERVOUS SYSTEM:  AOx3  CHEST/LUNG: Clear to percussion bilaterally; No rales, rhonchi, wheezing, or rubs  HEART: Regular rate and rhythm; No murmurs, rubs, or gallops  ABDOMEN: Soft, Nontender, Nondistended; Bowel sounds present  EXTREMITIES:  2+ Peripheral Pulses, mild edema b/l, shins tender to palpation   LYMPH: No lymphadenopathy noted  SKIN: No rashes or lesions      Care Discussed with Consultants/Other Providers [ x] YES  [ ] NO

## 2020-09-06 NOTE — CHART NOTE - NSCHARTNOTEFT_GEN_A_CORE
ICU DOWN GRADE NOTE TO AI:    80F with significant medical history of HTN, folate deficiency anemia, GERD, LBP, COPD pressure ulcers, OA, ulcerative colitis, hypothyroidism is sent to ED from [facility] with c/o right eye swelling and dyspnea. Patient reports that her right eye selling has worsened from before. She has increased lacrimal discharge from both eyes. She also has difficulty breathing which has been worsening. She has been sitting up all day and night as she is having difficulty breathing. She states she feels comfortable sitting up with pillow at her back to support her. But says she got used to the dyspnea. She denies any eye pain. She reports that she has had this swelling before and was told to wear an eye patch which helped but she did not wear it this time. She denies fever, chest pain, nausea, vomiting, headache or other symptoms. (04 Sep 2020 01:18)    ED Course: In ED, patient's VS showed: temp 98F, HR 81, /81,  RR 20, SPO2  98% on 2L NC   Her lab work showed: H/H 10.5/38.6, CO2 58, AG:-12, BUN/Cr 31/1.33   ABG showed: 7.24/118/174/49 on 2L NC     Brief hospital course: Pt was admitted to medicine floor for acute on chronic hypoxic respiratory failure likely secondary to CHF exacerbation vs  COPD. Pt was transiently kept on BiPAP at ED. Later on nasal canula. Pt was started on iv steroid, Iv Lasix. Pt's abg showed chronic CO2 retention.   Pt was saturating well on nasal canula but seemed drowsy, hence transferred to ICU and placed on Bi-PAP.     Assessment;  1. Acute on chronic Hypoxic respiratory failure with Hypercapnia  2. Encephalopathy   3 CHF exacerbation  4. COPD  5. Eye swelling  6.HOLA  7. Respiratory acidosis        Neuro:  # Encephalopathy  -Pt's mental status waxing and waning. Likely secondary to CO2 retention  l  CARDIOVASCULAR:  # CHF  -Chest x-ray showed signs of congestion  -Elevated pro BNP 2025 noted  -Continue Lasix 40 daily  -Continue Metolazone  -Co2 retention noted on abg  -Will keep on BiPAP  -Avoid hyperoxia  -Strict Is and Os  -Daily weight    PULMONARY  #COPD  -Continue iv steroid  -Continue albuterol inhaler  -BiPAP for Co2 retention  -Avoid hyperoxia    GASTROINTESTINAL  No acute issues  Protonix for GI prophylaxis    RENAL  #Hola  -Likely cardiorenal  -Resolved  -Monitor       INFECTIOUS DISEASE  -Pt afebrile  -WBC within normal limit    ENDOCRINE  #Hypothyroidism  -NPO for now  -Resume Levothyroxine once diet is resumed      -Diet: Resumed diet.     PROPHYLAXIS  -DVT: Heparin s/c for DVT prophylaxis  -GI: Protonix for GI prophylaxis    Patient is stable to be down graded to AI.  Follow daily labs   -Patient will need Bi-PAP with intermittent breaks. patient was off BI-PAP during the day on 9/6    CODE STATUS: DNR/DNI

## 2020-09-06 NOTE — PHYSICAL THERAPY INITIAL EVALUATION ADULT - GENERAL OBSERVATIONS, REHAB EVAL
Patient received sitting up in her bed; w/ O2 @ 2 li NC, on cardiac monitor; w/ IV line; patient presents w/ round back posture.

## 2020-09-06 NOTE — CHART NOTE - NSCHARTNOTEFT_GEN_A_CORE
Pt noted to have hyperpigmentation and minimal bleeding (staining her gown) at the site of Heparin sc Injections. She has 2 patches on the abdomen and on the L arm. Holding Heparin for today, SCD boots can't be placed as Pt c/o pain from recent ankle surgery.   To resume DVT ppx when symptoms resolve.

## 2020-09-06 NOTE — PROGRESS NOTE ADULT - SUBJECTIVE AND OBJECTIVE BOX
Patient is a 80y old  Female who presents with a chief complaint of Dyspnea (06 Sep 2020 00:23)    PATIENT IS SEEN AND EXAMINED IN MEDICAL FLOOR.    ALLERGIES:  iodine containing compounds (Unknown)  Milk (Unknown)  morphine (Unknown)  Orange Juice (Unknown)  Rice (Unknown)      Daily     Daily Weight in k.5 (06 Sep 2020 07:00)    VITALS:    Vital Signs Last 24 Hrs  T(C): 37.3 (06 Sep 2020 12:00), Max: 37.3 (06 Sep 2020 12:00)  T(F): 99.1 (06 Sep 2020 12:00), Max: 99.1 (06 Sep 2020 12:00)  HR: 99 (06 Sep 2020 13:00) (71 - 113)  BP: 135/98 (06 Sep 2020 13:00) (126/77 - 198/93)  BP(mean): 110 (06 Sep 2020 13:00) (89 - 124)  RR: 23 (06 Sep 2020 13:00) (16 - 31)  SpO2: 96% (06 Sep 2020 13:00) (92% - 100%)    LABS:    CBC Full  -  ( 06 Sep 2020 04:43 )  WBC Count : 8.40 K/uL  RBC Count : 3.79 M/uL  Hemoglobin : 11.3 g/dL  Hematocrit : 38.1 %  Platelet Count - Automated : 227 K/uL  Mean Cell Volume : 100.5 fl  Mean Cell Hemoglobin : 29.8 pg  Mean Cell Hemoglobin Concentration : 29.7 gm/dL  Auto Neutrophil # : x  Auto Lymphocyte # : x  Auto Monocyte # : x  Auto Eosinophil # : x  Auto Basophil # : x  Auto Neutrophil % : x  Auto Lymphocyte % : x  Auto Monocyte % : x  Auto Eosinophil % : x  Auto Basophil % : x    PT/INR - ( 06 Sep 2020 13:48 )   PT: 12.1 sec;   INR: 1.04 ratio         PTT - ( 06 Sep 2020 13:48 )  PTT:60.1 sec  09-    142  |  92<L>  |  40<H>  ----------------------------<  89  4.1   |  67<HH>  |  1.22    Ca    9.0      06 Sep 2020 04:43  Phos  2.6     09-06  Mg     2.4     09-06      CAPILLARY BLOOD GLUCOSE      POCT Blood Glucose.: 218 mg/dL (06 Sep 2020 10:28)  POCT Blood Glucose.: 72 mg/dL (06 Sep 2020 05:03)  POCT Blood Glucose.: 97 mg/dL (05 Sep 2020 20:14)    Creatinine Trend: 1.22<--, 1.14<--, 1.09<--, 1.14<--, 1.33<--  I&O's Summary    05 Sep 2020 07:01  -  06 Sep 2020 07:00  --------------------------------------------------------  IN: 500 mL / OUT: 600 mL / NET: -100 mL    06 Sep 2020 07:01  -  06 Sep 2020 14:04  --------------------------------------------------------  IN: 350 mL / OUT: 400 mL / NET: -50 mL        ABG - ( 05 Sep 2020 08:29 )  pH, Arterial: 7.48  pH, Blood: x     /  pCO2: 65    /  pO2: 70    / HCO3: 48    / Base Excess: 21.0  /  SaO2: 96          .Blood Blood-Peripheral   @ 09:52   No Growth Final  --  --      .Urine Clean Catch (Midstream)   @ 01:00   No growth  --  --    MEDICATIONS:    MEDICATIONS  (STANDING):  ALBUTerol    90 MICROgram(s) HFA Inhaler 1 Puff(s) Inhalation every 4 hours  amLODIPine   Tablet 5 milliGRAM(s) Oral daily  artificial  tears Solution 1 Drop(s) Both EYES two times a day  aspirin enteric coated 81 milliGRAM(s) Oral daily  atorvastatin 40 milliGRAM(s) Oral at bedtime  chlorhexidine 2% Cloths 1 Application(s) Topical <User Schedule>  ciprofloxacin  0.3% Ophthalmic Solution 2 Drop(s) Right EYE two times a day  folic acid 1 milliGRAM(s) Oral daily  furosemide   Injectable 40 milliGRAM(s) IV Push daily  heparin   Injectable 5000 Unit(s) SubCutaneous every 8 hours  hydrocortisone 2.5% Rectal Cream 1 Application(s) Rectal at bedtime  influenza   Vaccine 0.5 milliLiter(s) IntraMuscular once  levothyroxine 100 MICROGram(s) Oral daily  methylPREDNISolone sodium succinate Injectable 40 milliGRAM(s) IV Push daily  metolazone 5 milliGRAM(s) Oral daily  pantoprazole  Injectable 40 milliGRAM(s) IV Push daily  senna 2 Tablet(s) Oral at bedtime      MEDICATIONS  (PRN):      REVIEW OF SYSTEMS:                           ALL ROS DONE [ X   ]    CONSTITUTIONAL:  LETHARGIC [   ], FEVER [   ], UNRESPONSIVE [   ]  CVS:  CP  [   ], SOB, [   ], PALPITATIONS [   ], DIZZYNESS [   ]  RS: COUGH [   ], SPUTUM [   ]  GI: ABDOMINAL PAIN [   ], NAUSEA [   ], VOMITINGS [   ], DIARRHEA [   ], CONSTIPATION [   ]  :  DYSURIA [   ], NOCTURIA [   ], INCREASED FREQUENCY [   ], DRIBLING [   ],  SKELETAL: PAINFUL JOINTS [   ], SWOLLEN JOINTS [   ], NECK ACHE [   ], LOW BACK ACHE [   ],  SKIN : ULCERS [   ], RASH [   ], ITCHING [   ]  CNS: HEAD ACHE [   ], DOUBLE VISION [   ], BLURRED VISION [   ], AMS / CONFUSION [   ], SEIZURES [   ], WEAKNESS [   ],TINGLING / NUMBNESS [   ]    PHYSICAL EXAMINATION:  GENERAL APPEARANCE: NO DISTRESS  HEENT:  NO PALLOR, NO  JVD,  NO NODES, NECK SUPPLE   BILATERAL EYES WITH CRUSTING  CVS: S1 +, S2 +,   RS: AEEB,  OCCASIONAL  RALES +,   NO RONCHI  ABD: SOFT, NT, NO, BS +  EXT: PE  SKIN: WARM, DRY  SKELETAL:  ROM ACCEPTABLE  CNS:  AAO X 2 ,   DEFICITS    RADIOLOGY :    < from: Xray Chest 1 View-PORTABLE IMMEDIATE (20 @ 18:02) >  INTERPRETATION:  AP semisupine chest on September 3, 2020 at 5:55 PM. Patient has increasing CO2 level. Patient was positive for the Covid virus on  with negative tests up to . Patient has facial swelling possibly from allergic reaction. Patient has renal disease and respiratory failure. Patient has COPD. Patient has hypertension. Patient's chin obscures the apices.    Shallow inspiration crowds the chest.    Heart may be enlarged.    There is a mild to moderate central congestion in the lungs increased from .    IMPRESSION: Increasing CHF.    < end of copied text >      < from: US Duplex Venous Lower Ext Complete, Bilateral (20 @ 13:13) >    IMPRESSION:  No evidence of deep venous thrombosis in either lower extremity.    < end of copied text >    < from: Transthoracic Echocardiogram (20 @ 06:57) >  CONCLUSIONS:  1. Mitral annular calcification, and calcifiedmitral  leaflets with normal diastolic opening.  2. Calcified trileaflet aortic valve with normal opening.  Trace aortic regurgitation.  3. Aortic Root: 3.2 cm.  4. Normal left atrium.  LA volume index = 24 cc/m2.  5. Mild concentric left ventricularhypertrophy.  6. Normal Left Ventricular Systolic Function,  (EF = 55 to  60%)  7. Grade II diastolic dysfunction.  8. Normal right atrium.  9. Normal right ventricular size and systolic function  (TAPSE  1.8cm).  10. RV systolic pressure is normal at  26 mm Hg.  11. There is trace tricuspid regurgitation.  12. Pulmonic valve not well seen.  13. Normal pericardium with no pericardial effusion.    < end of copied text >    ASSESSMENT :     Other abnormality of breathing  Yes  Pressure ulcer  Pain, low back  Ulcerative colitis  Osteoarthritis  Hypothyroidism  GERD (gastroesophageal reflux disease)  Hypertension  History of ankle surgery  H/O: hysterectomy      PLAN:  HPI:  80F with significant medical history of HTN, folate deficiency anemia, GERD, LBP, COPD pressure ulcers, OA, ulcerative colitis, hypothyroidism is sent to ED from [facility] with c/o right eye swelling and dyspnea. Patient reports that her right eye selling has worsened from before. She has increased lacrimal discharge from both eyes. She also has difficulty breathing which has been worsening. She has been sitting up all day and night as she is having difficulty breathing. She states she feels comfortable sitting up with pillow at her back to support her. But says she got used to the dyspnea. She denies any eye pain. She reports that she has had this swelling before and was told to wear an eye patch which helped but she did not wear it this time. She denies fever, chest pain, nausea, vomiting, headache or other symptoms. (04 Sep 2020 01:18)      # ACUTE ON CHRONIC HYPOXIC HYPERCAPNIC RESPIRATORY FAILURE S/T SUSPECTED ARIN/OHS W/ COPD AND SUSPECTED CHF [ELEVATED PRO-BNP] - S/P DOSE OF IV LASIX, ON BIPAP OVERNIGHT IN ICU WITH IMPROVEMENT, LASIX 40MG PO QDAY, METOLAZONE, F/U ECHOCARDIOGRAM, STRICT IS AND OS, PULMONOLOGY CONSULT IN PROGRESS  # SUSPECT COPD EXACERBATION - PLACED ON IV STEROIDS, ALBUTEROL INHALER  # ACUTE METABOLIC ENCEPHALOPATHY - IMPROVED - SUSPECTED S/T HYPERCAPNIA - MONITOR  # SUSPECTED CONJUNCTIVITIS - ON ERYTHROMYCIN DROPS  # ULCERATIVE COLITIS  # HYPOTHYROIDISM - RESUME LEVOTHYROXINE  # HTN  # CHRONIC MACROCYTIC ANEMIA   # OA  # GI AND DVT PPX    NADINE MONTOYA M.D. COVERING LETHA MONTOYA M.D. Patient is a 80y old  Female who presents with a chief complaint of Dyspnea (06 Sep 2020 00:23)    PATIENT IS SEEN AND EXAMINED IN MEDICAL FLOOR.    ALLERGIES:  iodine containing compounds (Unknown)  Milk (Unknown)  morphine (Unknown)  Orange Juice (Unknown)  Rice (Unknown)      Daily     Daily Weight in k.5 (06 Sep 2020 07:00)    VITALS:    Vital Signs Last 24 Hrs  T(C): 37.3 (06 Sep 2020 12:00), Max: 37.3 (06 Sep 2020 12:00)  T(F): 99.1 (06 Sep 2020 12:00), Max: 99.1 (06 Sep 2020 12:00)  HR: 99 (06 Sep 2020 13:00) (71 - 113)  BP: 135/98 (06 Sep 2020 13:00) (126/77 - 198/93)  BP(mean): 110 (06 Sep 2020 13:00) (89 - 124)  RR: 23 (06 Sep 2020 13:00) (16 - 31)  SpO2: 96% (06 Sep 2020 13:00) (92% - 100%)    LABS:    CBC Full  -  ( 06 Sep 2020 04:43 )  WBC Count : 8.40 K/uL  RBC Count : 3.79 M/uL  Hemoglobin : 11.3 g/dL  Hematocrit : 38.1 %  Platelet Count - Automated : 227 K/uL  Mean Cell Volume : 100.5 fl  Mean Cell Hemoglobin : 29.8 pg  Mean Cell Hemoglobin Concentration : 29.7 gm/dL  Auto Neutrophil # : x  Auto Lymphocyte # : x  Auto Monocyte # : x  Auto Eosinophil # : x  Auto Basophil # : x  Auto Neutrophil % : x  Auto Lymphocyte % : x  Auto Monocyte % : x  Auto Eosinophil % : x  Auto Basophil % : x    PT/INR - ( 06 Sep 2020 13:48 )   PT: 12.1 sec;   INR: 1.04 ratio         PTT - ( 06 Sep 2020 13:48 )  PTT:60.1 sec  09-    142  |  92<L>  |  40<H>  ----------------------------<  89  4.1   |  67<HH>  |  1.22    Ca    9.0      06 Sep 2020 04:43  Phos  2.6     09-06  Mg     2.4     09-06      CAPILLARY BLOOD GLUCOSE      POCT Blood Glucose.: 218 mg/dL (06 Sep 2020 10:28)  POCT Blood Glucose.: 72 mg/dL (06 Sep 2020 05:03)  POCT Blood Glucose.: 97 mg/dL (05 Sep 2020 20:14)    Creatinine Trend: 1.22<--, 1.14<--, 1.09<--, 1.14<--, 1.33<--  I&O's Summary    05 Sep 2020 07:01  -  06 Sep 2020 07:00  --------------------------------------------------------  IN: 500 mL / OUT: 600 mL / NET: -100 mL    06 Sep 2020 07:01  -  06 Sep 2020 14:04  --------------------------------------------------------  IN: 350 mL / OUT: 400 mL / NET: -50 mL        ABG - ( 05 Sep 2020 08:29 )  pH, Arterial: 7.48  pH, Blood: x     /  pCO2: 65    /  pO2: 70    / HCO3: 48    / Base Excess: 21.0  /  SaO2: 96          .Blood Blood-Peripheral   @ 09:52   No Growth Final  --  --      .Urine Clean Catch (Midstream)   @ 01:00   No growth  --  --    MEDICATIONS:    MEDICATIONS  (STANDING):  ALBUTerol    90 MICROgram(s) HFA Inhaler 1 Puff(s) Inhalation every 4 hours  amLODIPine   Tablet 5 milliGRAM(s) Oral daily  artificial  tears Solution 1 Drop(s) Both EYES two times a day  aspirin enteric coated 81 milliGRAM(s) Oral daily  atorvastatin 40 milliGRAM(s) Oral at bedtime  chlorhexidine 2% Cloths 1 Application(s) Topical <User Schedule>  ciprofloxacin  0.3% Ophthalmic Solution 2 Drop(s) Right EYE two times a day  folic acid 1 milliGRAM(s) Oral daily  furosemide   Injectable 40 milliGRAM(s) IV Push daily  heparin   Injectable 5000 Unit(s) SubCutaneous every 8 hours  hydrocortisone 2.5% Rectal Cream 1 Application(s) Rectal at bedtime  influenza   Vaccine 0.5 milliLiter(s) IntraMuscular once  levothyroxine 100 MICROGram(s) Oral daily  methylPREDNISolone sodium succinate Injectable 40 milliGRAM(s) IV Push daily  metolazone 5 milliGRAM(s) Oral daily  pantoprazole  Injectable 40 milliGRAM(s) IV Push daily  senna 2 Tablet(s) Oral at bedtime      MEDICATIONS  (PRN):      REVIEW OF SYSTEMS:                           ALL ROS DONE [ X   ]    CONSTITUTIONAL:  LETHARGIC [   ], FEVER [   ], UNRESPONSIVE [   ]  CVS:  CP  [   ], SOB, [   ], PALPITATIONS [   ], DIZZYNESS [   ]  RS: COUGH [   ], SPUTUM [   ]  GI: ABDOMINAL PAIN [   ], NAUSEA [   ], VOMITINGS [   ], DIARRHEA [   ], CONSTIPATION [   ]  :  DYSURIA [   ], NOCTURIA [   ], INCREASED FREQUENCY [   ], DRIBLING [   ],  SKELETAL: PAINFUL JOINTS [   ], SWOLLEN JOINTS [   ], NECK ACHE [   ], LOW BACK ACHE [   ],  SKIN : ULCERS [   ], RASH [   ], ITCHING [   ]  CNS: HEAD ACHE [   ], DOUBLE VISION [   ], BLURRED VISION [   ], AMS / CONFUSION [   ], SEIZURES [   ], WEAKNESS [   ],TINGLING / NUMBNESS [   ]    PHYSICAL EXAMINATION:  GENERAL APPEARANCE: NO DISTRESS  HEENT:  NO PALLOR, NO  JVD,  NO NODES, NECK SUPPLE   BILATERAL EYES WITH CRUSTING  CVS: S1 +, S2 +,   RS: AEEB,  OCCASIONAL  RALES +,   NO RONCHI  ABD: SOFT, NT, NO, BS +  EXT: PE  SKIN: WARM, DRY  SKELETAL:  ROM ACCEPTABLE  CNS:  AAO X 2 ,   DEFICITS    RADIOLOGY :    < from: Xray Chest 1 View-PORTABLE IMMEDIATE (20 @ 18:02) >  INTERPRETATION:  AP semisupine chest on September 3, 2020 at 5:55 PM. Patient has increasing CO2 level. Patient was positive for the Covid virus on  with negative tests up to . Patient has facial swelling possibly from allergic reaction. Patient has renal disease and respiratory failure. Patient has COPD. Patient has hypertension. Patient's chin obscures the apices.    Shallow inspiration crowds the chest.    Heart may be enlarged.    There is a mild to moderate central congestion in the lungs increased from .    IMPRESSION: Increasing CHF.    < end of copied text >      < from: US Duplex Venous Lower Ext Complete, Bilateral (20 @ 13:13) >    IMPRESSION:  No evidence of deep venous thrombosis in either lower extremity.    < end of copied text >    < from: Transthoracic Echocardiogram (20 @ 06:57) >  CONCLUSIONS:  1. Mitral annular calcification, and calcifiedmitral  leaflets with normal diastolic opening.  2. Calcified trileaflet aortic valve with normal opening.  Trace aortic regurgitation.  3. Aortic Root: 3.2 cm.  4. Normal left atrium.  LA volume index = 24 cc/m2.  5. Mild concentric left ventricularhypertrophy.  6. Normal Left Ventricular Systolic Function,  (EF = 55 to  60%)  7. Grade II diastolic dysfunction.  8. Normal right atrium.  9. Normal right ventricular size and systolic function  (TAPSE  1.8cm).  10. RV systolic pressure is normal at  26 mm Hg.  11. There is trace tricuspid regurgitation.  12. Pulmonic valve not well seen.  13. Normal pericardium with no pericardial effusion.    < end of copied text >    ASSESSMENT :     Other abnormality of breathing  Yes  Pressure ulcer  Pain, low back  Ulcerative colitis  Osteoarthritis  Hypothyroidism  GERD (gastroesophageal reflux disease)  Hypertension  History of ankle surgery  H/O: hysterectomy      PLAN:  HPI:  80F with significant medical history of HTN, folate deficiency anemia, GERD, LBP, COPD pressure ulcers, OA, ulcerative colitis, hypothyroidism is sent to ED from [facility] with c/o right eye swelling and dyspnea. Patient reports that her right eye selling has worsened from before. She has increased lacrimal discharge from both eyes. She also has difficulty breathing which has been worsening. She has been sitting up all day and night as she is having difficulty breathing. She states she feels comfortable sitting up with pillow at her back to support her. But says she got used to the dyspnea. She denies any eye pain. She reports that she has had this swelling before and was told to wear an eye patch which helped but she did not wear it this time. She denies fever, chest pain, nausea, vomiting, headache or other symptoms. (04 Sep 2020 01:18)      # ACUTE ON CHRONIC HYPOXIC HYPERCAPNIC RESPIRATORY FAILURE S/T SUSPECTED ARIN/OHS W/ COPD AND SUSPECTED CHF [ELEVATED PRO-BNP] w/ D2DD [PER 2020 ECHOCARDIOGRAM] - S/P DOSE OF IV LASIX, ON BIPAP OVERNIGHT IN ICU WITH IMPROVEMENT, LASIX 40MG PO QDAY, METOLAZONE, STRICT IS AND OS, PULMONOLOGY CONSULT IN PROGRESS  # SUSPECT COPD EXACERBATION - PLACED ON IV STEROIDS, ALBUTEROL INHALER  # ACUTE METABOLIC ENCEPHALOPATHY - IMPROVED - SUSPECTED S/T HYPERCAPNIA - MONITOR  # SUSPECTED CONJUNCTIVITIS - IMPROVED - ON ERYTHROMYCIN DROPS  # ULCERATIVE COLITIS  # HYPOTHYROIDISM - RESUME LEVOTHYROXINE  # HTN  # CHRONIC MACROCYTIC ANEMIA   # OA  # GI AND DVT PPX    NADINE MONTOYA M.D. COVERING LETHA MONTOYA M.D.

## 2020-09-06 NOTE — PROGRESS NOTE ADULT - ASSESSMENT
80F with significant medical history of HTN, folate deficiency anemia, GERD, LBP, COPD pressure ulcers, OA, ulcerative colitis, hypothyroidism is sent to ED from [facility] with c/o right eye swelling and dyspnea. Admitted to medicine for respiratory failure secondary to CHF exacerbation.  ICU was consulted for worsening shortness of breath  requiring BiPAP.      Assessment;  1. Acute on chronic Hypoxic respiratory failure with Hypercapnia  2. Encephalopathy   3 CHF exacerbation  4. COPD  5. Eye swelling  6.HOLA  7. Respiratory acidosis        Neuro:  # Encephalopathy  -Pt seems drowsy. Likely secondary to CO2 retention  l  CARDIOVASCULAR  # CHF  -Chest xray showed signs of congestion  -Elevated pro BNP 2025 noted  -Continue Lasix 40 daily  -Continue Metolazone  -Co2 retention noted on abg  -Will keep on BiPAP  -Avoid hyperoxia  -Strict Is and Os  -Daily weight    PULMONARY  #COPD  -Continue iv steroid  -Continue albuterol inhaler  -BiPAP for Co2 retention  -Avoid hyperoxia    GASTROINTESTINAL  No acute issues  Protonix for GI bprophylaxis    RENAL  #Hola  -Likely cardiorenal  -Resolved  -Monitor       INFECTIOUS DISEASE  -Pt afebrile  -WBC within normal limit    ENDOCRINE  #Hypothyroidism  -NPO for now  -Resume Levothyroxine once diet is resumed        -Diet: NPO for now as pt is on BiPAP    PROPHYLAXIS  -DVT: Heparin s/c for DVT prophylaxis  -GI: Protonix for GI prophylaxis    DISPO : ICU    CODE STATUS: DNR/DNI

## 2020-09-06 NOTE — PHYSICAL THERAPY INITIAL EVALUATION ADULT - PERTINENT HX OF CURRENT PROBLEM, REHAB EVAL
Patient admitted from [facility] due to swelling to (R) eye and dyspnea.

## 2020-09-07 DIAGNOSIS — M40.50 LORDOSIS, UNSPECIFIED, SITE UNSPECIFIED: ICD-10-CM

## 2020-09-07 DIAGNOSIS — J96.21 ACUTE AND CHRONIC RESPIRATORY FAILURE WITH HYPOXIA: ICD-10-CM

## 2020-09-07 DIAGNOSIS — M41.9 SCOLIOSIS, UNSPECIFIED: ICD-10-CM

## 2020-09-07 DIAGNOSIS — I50.9 HEART FAILURE, UNSPECIFIED: ICD-10-CM

## 2020-09-07 DIAGNOSIS — D63.8 ANEMIA IN OTHER CHRONIC DISEASES CLASSIFIED ELSEWHERE: ICD-10-CM

## 2020-09-07 LAB
ANION GAP SERPL CALC-SCNC: 2 MMOL/L — LOW (ref 5–17)
BUN SERPL-MCNC: 42 MG/DL — HIGH (ref 7–18)
CALCIUM SERPL-MCNC: 9 MG/DL — SIGNIFICANT CHANGE UP (ref 8.4–10.5)
CHLORIDE SERPL-SCNC: 88 MMOL/L — LOW (ref 96–108)
CO2 SERPL-SCNC: 47 MMOL/L — CRITICAL HIGH (ref 22–31)
CREAT SERPL-MCNC: 1.41 MG/DL — HIGH (ref 0.5–1.3)
GLUCOSE SERPL-MCNC: 78 MG/DL — SIGNIFICANT CHANGE UP (ref 70–99)
HCT VFR BLD CALC: 36.4 % — SIGNIFICANT CHANGE UP (ref 34.5–45)
HGB BLD-MCNC: 11.6 G/DL — SIGNIFICANT CHANGE UP (ref 11.5–15.5)
MAGNESIUM SERPL-MCNC: 2.5 MG/DL — SIGNIFICANT CHANGE UP (ref 1.6–2.6)
MCHC RBC-ENTMCNC: 30.9 PG — SIGNIFICANT CHANGE UP (ref 27–34)
MCHC RBC-ENTMCNC: 31.9 GM/DL — LOW (ref 32–36)
MCV RBC AUTO: 96.8 FL — SIGNIFICANT CHANGE UP (ref 80–100)
NRBC # BLD: 0 /100 WBCS — SIGNIFICANT CHANGE UP (ref 0–0)
PHOSPHATE SERPL-MCNC: 3.1 MG/DL — SIGNIFICANT CHANGE UP (ref 2.5–4.5)
PLATELET # BLD AUTO: 220 K/UL — SIGNIFICANT CHANGE UP (ref 150–400)
POTASSIUM SERPL-MCNC: 4.5 MMOL/L — SIGNIFICANT CHANGE UP (ref 3.5–5.3)
POTASSIUM SERPL-SCNC: 4.5 MMOL/L — SIGNIFICANT CHANGE UP (ref 3.5–5.3)
RBC # BLD: 3.76 M/UL — LOW (ref 3.8–5.2)
RBC # FLD: 14.7 % — HIGH (ref 10.3–14.5)
SODIUM SERPL-SCNC: 137 MMOL/L — SIGNIFICANT CHANGE UP (ref 135–145)
WBC # BLD: 7.3 K/UL — SIGNIFICANT CHANGE UP (ref 3.8–10.5)
WBC # FLD AUTO: 7.3 K/UL — SIGNIFICANT CHANGE UP (ref 3.8–10.5)

## 2020-09-07 RX ADMIN — Medication 1 DROP(S): at 05:45

## 2020-09-07 RX ADMIN — ATORVASTATIN CALCIUM 40 MILLIGRAM(S): 80 TABLET, FILM COATED ORAL at 21:53

## 2020-09-07 RX ADMIN — Medication 100 MICROGRAM(S): at 05:40

## 2020-09-07 RX ADMIN — AMLODIPINE BESYLATE 5 MILLIGRAM(S): 2.5 TABLET ORAL at 05:40

## 2020-09-07 RX ADMIN — CHLORHEXIDINE GLUCONATE 1 APPLICATION(S): 213 SOLUTION TOPICAL at 05:42

## 2020-09-07 RX ADMIN — Medication 1 APPLICATION(S): at 21:54

## 2020-09-07 RX ADMIN — Medication 2 DROP(S): at 17:54

## 2020-09-07 RX ADMIN — Medication 1 DROP(S): at 18:35

## 2020-09-07 RX ADMIN — Medication 2 DROP(S): at 06:49

## 2020-09-07 NOTE — PROGRESS NOTE ADULT - SUBJECTIVE AND OBJECTIVE BOX
Patient is a 80y old  Female who presents with a chief complaint of Dyspnea (06 Sep 2020 14:04)    PATIENT IS SEEN AND EXAMINED IN MEDICAL FLOOR.    ALLERGIES:  iodine containing compounds (Unknown)  Milk (Unknown)  morphine (Unknown)  Orange Juice (Unknown)  Rice (Unknown)     VITALS:    Vital Signs Last 24 Hrs  T(C): 36.7 (07 Sep 2020 14:00), Max: 36.8 (07 Sep 2020 05:00)  T(F): 98.1 (07 Sep 2020 14:00), Max: 98.2 (07 Sep 2020 05:00)  HR: 102 (07 Sep 2020 14:00) (87 - 114)  BP: 120/64 (07 Sep 2020 14:00) (120/64 - 170/98)  BP(mean): 91 (07 Sep 2020 00:14) (91 - 124)  RR: 22 (07 Sep 2020 14:00) (18 - 30)  SpO2: 97% (07 Sep 2020 14:00) (96% - 100%)    LABS:    CBC Full  -  ( 07 Sep 2020 07:06 )  WBC Count : 7.30 K/uL  RBC Count : 3.76 M/uL  Hemoglobin : 11.6 g/dL  Hematocrit : 36.4 %  Platelet Count - Automated : 220 K/uL  Mean Cell Volume : 96.8 fl  Mean Cell Hemoglobin : 30.9 pg  Mean Cell Hemoglobin Concentration : 31.9 gm/dL  Auto Neutrophil # : x  Auto Lymphocyte # : x  Auto Monocyte # : x  Auto Eosinophil # : x  Auto Basophil # : x  Auto Neutrophil % : x  Auto Lymphocyte % : x  Auto Monocyte % : x  Auto Eosinophil % : x  Auto Basophil % : x    PT/INR - ( 06 Sep 2020 13:48 )   PT: 12.1 sec;   INR: 1.04 ratio         PTT - ( 06 Sep 2020 13:48 )  PTT:60.1 sec  09-07    137  |  88<L>  |  42<H>  ----------------------------<  78  4.5   |  47<HH>  |  1.41<H>    Ca    9.0      07 Sep 2020 07:06  Phos  3.1     09-07  Mg     2.5     09-07      CAPILLARY BLOOD GLUCOSE      POCT Blood Glucose.: 128 mg/dL (07 Sep 2020 11:44)  POCT Blood Glucose.: 92 mg/dL (07 Sep 2020 06:32)  POCT Blood Glucose.: 110 mg/dL (06 Sep 2020 23:53)  POCT Blood Glucose.: 165 mg/dL (06 Sep 2020 17:32)          Creatinine Trend: 1.41<--, 1.22<--, 1.14<--, 1.09<--, 1.14<--, 1.33<--  I&O's Summary    06 Sep 2020 07:01  -  07 Sep 2020 07:00  --------------------------------------------------------  IN: 550 mL / OUT: 600 mL / NET: -50 mL      .Blood Blood-Peripheral  06-17 @ 09:52   No Growth Final  --  --      .Urine Clean Catch (Midstream)  06-17 @ 01:00   No growth  --  --    MEDICATIONS:    MEDICATIONS  (STANDING):  ALBUTerol    90 MICROgram(s) HFA Inhaler 1 Puff(s) Inhalation every 4 hours  amLODIPine   Tablet 5 milliGRAM(s) Oral daily  artificial  tears Solution 1 Drop(s) Both EYES two times a day  aspirin enteric coated 81 milliGRAM(s) Oral daily  atorvastatin 40 milliGRAM(s) Oral at bedtime  chlorhexidine 2% Cloths 1 Application(s) Topical <User Schedule>  ciprofloxacin  0.3% Ophthalmic Solution 2 Drop(s) Right EYE two times a day  folic acid 1 milliGRAM(s) Oral daily  furosemide   Injectable 40 milliGRAM(s) IV Push daily  heparin   Injectable 5000 Unit(s) SubCutaneous every 8 hours  hydrocortisone 2.5% Rectal Cream 1 Application(s) Rectal at bedtime  influenza   Vaccine 0.5 milliLiter(s) IntraMuscular once  levothyroxine 100 MICROGram(s) Oral daily  methylPREDNISolone sodium succinate Injectable 40 milliGRAM(s) IV Push daily  metolazone 5 milliGRAM(s) Oral daily  pantoprazole  Injectable 40 milliGRAM(s) IV Push daily  senna 2 Tablet(s) Oral at bedtime      MEDICATIONS  (PRN):    REVIEW OF SYSTEMS:                           ALL ROS DONE [ X   ]    CONSTITUTIONAL:  LETHARGIC [   ], FEVER [   ], UNRESPONSIVE [   ]  CVS:  CP  [   ], SOB, [   ], PALPITATIONS [   ], DIZZYNESS [   ]  RS: COUGH [   ], SPUTUM [   ]  GI: ABDOMINAL PAIN [   ], NAUSEA [   ], VOMITINGS [   ], DIARRHEA [   ], CONSTIPATION [   ]  :  DYSURIA [   ], NOCTURIA [   ], INCREASED FREQUENCY [   ], DRIBLING [   ],  SKELETAL: PAINFUL JOINTS [   ], SWOLLEN JOINTS [   ], NECK ACHE [   ], LOW BACK ACHE [   ],  SKIN : ULCERS [   ], RASH [   ], ITCHING [   ]  CNS: HEAD ACHE [   ], DOUBLE VISION [   ], BLURRED VISION [   ], AMS / CONFUSION [   ], SEIZURES [   ], WEAKNESS [   ],TINGLING / NUMBNESS [   ]      PHYSICAL EXAMINATION:  GENERAL APPEARANCE: NO DISTRESS  HEENT:  NO PALLOR, NO  JVD,  NO NODES, NECK SUPPLE   BILATERAL EYES WITH CRUSTING  CVS: S1 +, S2 +,   RS: AEEB,  OCCASIONAL  RALES +,   NO RONCHI  ABD: SOFT, NT, NO, BS +  EXT: PE  SKIN: WARM, DRY  SKELETAL:  ROM ACCEPTABLE  CNS:  AAO X 2 ,   DEFICITS    RADIOLOGY :    < from: Xray Chest 1 View-PORTABLE IMMEDIATE (09.03.20 @ 18:02) >  INTERPRETATION:  AP semisupine chest on September 3, 2020 at 5:55 PM. Patient has increasing CO2 level. Patient was positive for the Covid virus on June 18 with negative tests up to June 23. Patient has facial swelling possibly from allergic reaction. Patient has renal disease and respiratory failure. Patient has COPD. Patient has hypertension. Patient's chin obscures the apices.    Shallow inspiration crowds the chest.    Heart may be enlarged.    There is a mild to moderate central congestion in the lungs increased from June 16.    IMPRESSION: Increasing CHF.    < end of copied text >      < from: US Duplex Venous Lower Ext Complete, Bilateral (09.04.20 @ 13:13) >    IMPRESSION:  No evidence of deep venous thrombosis in either lower extremity.    < end of copied text >    < from: Transthoracic Echocardiogram (09.05.20 @ 06:57) >  CONCLUSIONS:  1. Mitral annular calcification, and calcifiedmitral  leaflets with normal diastolic opening.  2. Calcified trileaflet aortic valve with normal opening.  Trace aortic regurgitation.  3. Aortic Root: 3.2 cm.  4. Normal left atrium.  LA volume index = 24 cc/m2.  5. Mild concentric left ventricularhypertrophy.  6. Normal Left Ventricular Systolic Function,  (EF = 55 to  60%)  7. Grade II diastolic dysfunction.  8. Normal right atrium.  9. Normal right ventricular size and systolic function  (TAPSE  1.8cm).  10. RV systolic pressure is normal at  26 mm Hg.  11. There is trace tricuspid regurgitation.  12. Pulmonic valve not well seen.  13. Normal pericardium with no pericardial effusion.    < end of copied text >    ASSESSMENT :     Other abnormality of breathing  Yes  Pressure ulcer  Pain, low back  Ulcerative colitis  Osteoarthritis  Hypothyroidism  GERD (gastroesophageal reflux disease)  Hypertension  History of ankle surgery  H/O: hysterectomy      PLAN:  HPI:  80F with significant medical history of HTN, folate deficiency anemia, GERD, LBP, COPD pressure ulcers, OA, ulcerative colitis, hypothyroidism is sent to ED from [facility] with c/o right eye swelling and dyspnea. Patient reports that her right eye selling has worsened from before. She has increased lacrimal discharge from both eyes. She also has difficulty breathing which has been worsening. She has been sitting up all day and night as she is having difficulty breathing. She states she feels comfortable sitting up with pillow at her back to support her. But says she got used to the dyspnea. She denies any eye pain. She reports that she has had this swelling before and was told to wear an eye patch which helped but she did not wear it this time. She denies fever, chest pain, nausea, vomiting, headache or other symptoms. (04 Sep 2020 01:18)      # ACUTE ON CHRONIC HYPOXIC HYPERCAPNIC RESPIRATORY FAILURE S/T SUSPECTED ARIN/OHS W/ COPD AND SUSPECTED CHF [ELEVATED PRO-BNP] w/ D2DD [PER 09/2020 ECHOCARDIOGRAM] - ON TRILOGY QHS, LASIX 40MG QDAY, METOLAZONE, STRICT IS AND OS, PULMONOLOGY CONSULT IN PROGRESS; DC METOLAZONE  # SAUL - DC METOLAZONE; MONITOR RENAL FUNCTION  # SUSPECT COPD EXACERBATION - PLACED ON IV STEROIDS, ALBUTEROL INHALER  # ACUTE METABOLIC ENCEPHALOPATHY - IMPROVED - SUSPECTED S/T HYPERCAPNIA - MONITOR  # SUSPECTED CONJUNCTIVITIS - IMPROVED - ON ERYTHROMYCIN DROPS  # ULCERATIVE COLITIS  # HYPOTHYROIDISM - RESUME LEVOTHYROXINE  # HTN  # CHRONIC MACROCYTIC ANEMIA   # OA  # DC TO [facility] WHEN MEDICALLY STABLE AND TRILOGY DEVICE IS ARRANGED  # GI AND DVT PPX    NADINE MONTOYA M.D. COVERING LETHA MONTOYA M.D.

## 2020-09-07 NOTE — PROGRESS NOTE ADULT - PROBLEM SELECTOR PLAN 1
Acute resolving. Multifactorial: COPD, kyphoscoliosis and cervial lordosis.  Continue BIPAP nightly and as needed during the day. Will need NIV upon discharge because the patient remain hypercapnic despite BIPAP usage. Patient requires the Tidal volume that NIV will deliver. Without NIV patient is a high risk for intubation and readmission.  Continue oxygen.

## 2020-09-07 NOTE — PROGRESS NOTE ADULT - ASSESSMENT
80F with significant medical history of HTN, folate deficiency anemia, GERD, LBP, COPD pressure ulcers, OA, ulcerative colitis, hypothyroidism is sent to ED from [facility] with c/o right eye swelling and dyspnea. Patient reports that her right eye selling has worsened from before. She has increased lacrimal discharge from both eyes. She also has difficulty breathing which has been worsening. She has been sitting up all day and night as she is having difficulty breathing. She states she feels comfortable sitting up with pillow at her back to support her. But says she got used to the dyspnea. She denies any eye pain. She reports that she has had this swelling before and was told to wear an eye patch which helped but she did not wear it this time. She denies fever, chest pain, nausea, vomiting, headache or other symptoms.     INTERVAL HPI/OVERNIGHT EVENTS: NAEON. Elevated BPs to 190s early am, gave additional 5mg Norvasc and increased daily dose to 5mg. VS otherwise wnl. Patient refused BiPAP overnight 2/2 discomfort. On examination this am patient sitting hunched in bed. Reports feeling well other than ongoing swelling in her right eyelid. Denies SOB, cough, or pain. Continuing cipro eye drops. On albuterol and solumedrol 40mg Qd.   9/06  Transferred to SCU

## 2020-09-07 NOTE — PROGRESS NOTE ADULT - SUBJECTIVE AND OBJECTIVE BOX
KEELY GOMEZ    SCU progress note    INTERVAL HPI/OVERNIGHT EVENTS: ***Transferred from ICU to SCU last night. Admitted for Acute on chronic hypercapnic respiratory failure. PCO2 118 on admission  133 on 9/3  80F with significant medical history of HTN, folate deficiency anemia, GERD, LBP, COPD pressure ulcers, OA, ulcerative colitis, hypothyroidism is sent to ED from [facility] with c/o right eye swelling and dyspnea. Patient reports that her right eye selling has worsened from before. She has increased lacrimal discharge from both eyes. She also has difficulty breathing which has been worsening. She has been sitting up all day and night as she is having difficulty breathing. She states she feels comfortable sitting up with pillow at her back to support her. But says she got used to the dyspnea. She denies any eye pain. She reports that she has had this swelling before and was told to wear an eye patch which helped but she did not wear it this time. She denies fever, chest pain, nausea, vomiting, headache or other symptoms.     INTERVAL HPI/OVERNIGHT EVENTS: NAEON. Elevated BPs to 190s early am, gave additional 5mg Norvasc and increased daily dose to 5mg. VS otherwise wnl. Patient refused BiPAP overnight 2/2 discomfort. On examination this am patient sitting hunched in bed. Reports feeling well other than ongoing swelling in her right eyelid. Denies SOB, cough, or pain. Continuing cipro eye drops. On albuterol and solumedrol 40mg Qd.     DNR [x ]   DNI  [x  ]    Covid - 19 PCR: Negative 9/03    The 4Ms    What Matters Most: see [facility]  Age appropriate Medications/Screen for High Risk Medication: Yes  Mentation: see CAM below  Mobility: defer to physical exam    The Confusion Assessment Method (CAM) Diagnostic Algorithm     1: Acute Onset or Fluctuating Course  - Is there evidence of an acute change in mental status from the patient’s baseline? Did the (abnormal) behavior  fluctuate during the day, that is, tend to come and go, or increase and decrease in severity?  [ ] YES [ x] NO     2: Inattention  - Did the patient have difficulty focusing attention, being easily distractible, or having difficulty keeping track of what was being said?  [ ] YES [x ] NO     3: Disorganized thinking  -Was the patient’s thinking disorganized or incoherent, such as rambling or irrelevant conversation, unclear or illogical flow of ideas, or unpredictable switching from subject to subject?  [ ] YES [x ] NO    4: Altered Level of consciousness?  [ ] YES [x ] NO    The diagnosis of delirium by CAM requires the presence of features 1 and 2 and either 3 or 4.    PRESSORS: [ ] YES [ ] NO    Cardiovascular:  Heart Failure  Acute   Acute on Chronic  Chronic       amLODIPine   Tablet 5 milliGRAM(s) Oral daily  furosemide   Injectable 40 milliGRAM(s) IV Push daily  metolazone 5 milliGRAM(s) Oral daily    Pulmonary:  ALBUTerol    90 MICROgram(s) HFA Inhaler 1 Puff(s) Inhalation every 4 hours    Hematalogic:  aspirin enteric coated 81 milliGRAM(s) Oral daily  heparin   Injectable 5000 Unit(s) SubCutaneous every 8 hours    Other:  artificial  tears Solution 1 Drop(s) Both EYES two times a day  atorvastatin 40 milliGRAM(s) Oral at bedtime  chlorhexidine 2% Cloths 1 Application(s) Topical <User Schedule>  ciprofloxacin  0.3% Ophthalmic Solution 2 Drop(s) Right EYE two times a day  folic acid 1 milliGRAM(s) Oral daily  hydrocortisone 2.5% Rectal Cream 1 Application(s) Rectal at bedtime  influenza   Vaccine 0.5 milliLiter(s) IntraMuscular once  levothyroxine 100 MICROGram(s) Oral daily  methylPREDNISolone sodium succinate Injectable 40 milliGRAM(s) IV Push daily  pantoprazole  Injectable 40 milliGRAM(s) IV Push daily  senna 2 Tablet(s) Oral at bedtime    ALBUTerol    90 MICROgram(s) HFA Inhaler 1 Puff(s) Inhalation every 4 hours  amLODIPine   Tablet 5 milliGRAM(s) Oral daily  artificial  tears Solution 1 Drop(s) Both EYES two times a day  aspirin enteric coated 81 milliGRAM(s) Oral daily  atorvastatin 40 milliGRAM(s) Oral at bedtime  chlorhexidine 2% Cloths 1 Application(s) Topical <User Schedule>  ciprofloxacin  0.3% Ophthalmic Solution 2 Drop(s) Right EYE two times a day  folic acid 1 milliGRAM(s) Oral daily  furosemide   Injectable 40 milliGRAM(s) IV Push daily  heparin   Injectable 5000 Unit(s) SubCutaneous every 8 hours  hydrocortisone 2.5% Rectal Cream 1 Application(s) Rectal at bedtime  influenza   Vaccine 0.5 milliLiter(s) IntraMuscular once  levothyroxine 100 MICROGram(s) Oral daily  methylPREDNISolone sodium succinate Injectable 40 milliGRAM(s) IV Push daily  metolazone 5 milliGRAM(s) Oral daily  pantoprazole  Injectable 40 milliGRAM(s) IV Push daily  senna 2 Tablet(s) Oral at bedtime    Drug Dosing Weight  Height (cm): 162.56 (16 Jun 2020 13:54)  Weight (kg): 77.7 (03 Sep 2020 15:15)  BMI (kg/m2): 29.4 (03 Sep 2020 15:15)  BSA (m2): 1.83 (03 Sep 2020 15:15)    CENTRAL LINE: [ ] YES [x ] NO  LOCATION:   DATE INSERTED:  REMOVE: [ ] YES [ ] NO  EXPLAIN:    AUGUST: [ ] YES [x ] NO    DATE INSERTED:  REMOVE:  [ ] YES [ ] NO  EXPLAIN:    PAST MEDICAL & SURGICAL HISTORY:  Pressure ulcer  Pain, low back  Ulcerative colitis  Osteoarthritis  Hypothyroidism  GERD (gastroesophageal reflux disease)  Hypertension  History of ankle surgery  H/O: hysterectomy              09-06 @ 07:01  -  09-07 @ 07:00  --------------------------------------------------------  IN: 550 mL / OUT: 600 mL / NET: -50 mL            PHYSICAL EXAM:    GENERAL: NAD, morbidly obese  HEAD:  Atraumatic, Normocephalic  EYES: EOMI, PERRLA, conjunctiva and sclera clear  ENMT: No tonsillar erythema, exudates  NECK: Supple, No JVD, +cervical lordosis  NERVOUS SYSTEM:  Alert & Oriented X3, Good concentration; Motor Strength 5/5 B/L upper and lower extremities; DTRs 2+ intact and symmetric  CHEST/LUNG: Diminished breath sounds bilateral bases  HEART: Regular rate and rhythm; No murmurs, rubs, or gallops  ABDOMEN: Soft, Obese, Nontender, Nondistended; Bowel sounds present  EXTREMITIES:  2+ Peripheral Pulses, No clubbing, cyanosis, or edema  LYMPH: No lymphadenopathy noted  SKIN: No rashes or lesions  +Cervical lordosis, +Severe kyphoscoliosis    LABS:  CBC Full  -  ( 07 Sep 2020 07:06 )  WBC Count : 7.30 K/uL  RBC Count : 3.76 M/uL  Hemoglobin : 11.6 g/dL  Hematocrit : 36.4 %  Platelet Count - Automated : 220 K/uL  Mean Cell Volume : 96.8 fl  Mean Cell Hemoglobin : 30.9 pg  Mean Cell Hemoglobin Concentration : 31.9 gm/dL  Auto Neutrophil # : x  Auto Lymphocyte # : x  Auto Monocyte # : x  Auto Eosinophil # : x  Auto Basophil # : x  Auto Neutrophil % : x  Auto Lymphocyte % : x  Auto Monocyte % : x  Auto Eosinophil % : x  Auto Basophil % : x    09-07    137  |  88<L>  |  42<H>  ----------------------------<  78  4.5   |  47<HH>  |  1.41<H>    Ca    9.0      07 Sep 2020 07:06  Phos  3.1     09-07  Mg     2.5     09-07      PT/INR - ( 06 Sep 2020 13:48 )   PT: 12.1 sec;   INR: 1.04 ratio         PTT - ( 06 Sep 2020 13:48 )  PTT:60.1 sec          [  ]  DVT Prophylaxis  [  ]  Nutrition, Brand, Rate         Goal Rate        Abnormal Nutritional Status -  Malnutrition   Cachexia      Morbid Obesity BMI >/=40    RADIOLOGY & ADDITIONAL STUDIES:  ***  < from: Xray Chest 1 View-PORTABLE IMMEDIATE (09.03.20 @ 18:02) >  Heart may be enlarged.    There is a mild to moderate central congestion in the lungs increased from June 16.    IMPRESSION: Increasing CHF.    < end of copied text >    Goals of Care Discussion with Family/Proxy/Other   -

## 2020-09-08 ENCOUNTER — TRANSCRIPTION ENCOUNTER (OUTPATIENT)
Age: 80
End: 2020-09-08

## 2020-09-08 VITALS
OXYGEN SATURATION: 95 % | SYSTOLIC BLOOD PRESSURE: 141 MMHG | TEMPERATURE: 98 F | HEART RATE: 108 BPM | DIASTOLIC BLOOD PRESSURE: 77 MMHG | RESPIRATION RATE: 18 BRPM

## 2020-09-08 LAB
ALBUMIN SERPL ELPH-MCNC: 3.2 G/DL — LOW (ref 3.5–5)
ALP SERPL-CCNC: 63 U/L — SIGNIFICANT CHANGE UP (ref 40–120)
ALT FLD-CCNC: 16 U/L DA — SIGNIFICANT CHANGE UP (ref 10–60)
ANION GAP SERPL CALC-SCNC: 2 MMOL/L — LOW (ref 5–17)
AST SERPL-CCNC: 19 U/L — SIGNIFICANT CHANGE UP (ref 10–40)
BASE EXCESS BLDA CALC-SCNC: 20.1 MMOL/L — HIGH (ref -2–2)
BILIRUB SERPL-MCNC: 0.9 MG/DL — SIGNIFICANT CHANGE UP (ref 0.2–1.2)
BLOOD GAS COMMENTS ARTERIAL: SIGNIFICANT CHANGE UP
BUN SERPL-MCNC: 47 MG/DL — HIGH (ref 7–18)
CALCIUM SERPL-MCNC: 8.6 MG/DL — SIGNIFICANT CHANGE UP (ref 8.4–10.5)
CHLORIDE SERPL-SCNC: 85 MMOL/L — LOW (ref 96–108)
CO2 SERPL-SCNC: 49 MMOL/L — CRITICAL HIGH (ref 22–31)
CREAT SERPL-MCNC: 2.07 MG/DL — HIGH (ref 0.5–1.3)
GLUCOSE SERPL-MCNC: 82 MG/DL — SIGNIFICANT CHANGE UP (ref 70–99)
HCO3 BLDA-SCNC: 48 MMOL/L — HIGH (ref 23–27)
HCT VFR BLD CALC: 36.2 % — SIGNIFICANT CHANGE UP (ref 34.5–45)
HGB BLD-MCNC: 11 G/DL — LOW (ref 11.5–15.5)
HOROWITZ INDEX BLDA+IHG-RTO: 26 — SIGNIFICANT CHANGE UP
MAGNESIUM SERPL-MCNC: 2.5 MG/DL — SIGNIFICANT CHANGE UP (ref 1.6–2.6)
MCHC RBC-ENTMCNC: 30.2 PG — SIGNIFICANT CHANGE UP (ref 27–34)
MCHC RBC-ENTMCNC: 30.4 GM/DL — LOW (ref 32–36)
MCV RBC AUTO: 99.5 FL — SIGNIFICANT CHANGE UP (ref 80–100)
NRBC # BLD: 0 /100 WBCS — SIGNIFICANT CHANGE UP (ref 0–0)
PCO2 BLDA: 73 MMHG — CRITICAL HIGH (ref 32–46)
PH BLDA: 7.43 — SIGNIFICANT CHANGE UP (ref 7.35–7.45)
PHOSPHATE SERPL-MCNC: 3.6 MG/DL — SIGNIFICANT CHANGE UP (ref 2.5–4.5)
PLATELET # BLD AUTO: 177 K/UL — SIGNIFICANT CHANGE UP (ref 150–400)
PO2 BLDA: 66 MMHG — LOW (ref 74–108)
POTASSIUM SERPL-MCNC: 4.5 MMOL/L — SIGNIFICANT CHANGE UP (ref 3.5–5.3)
POTASSIUM SERPL-SCNC: 4.5 MMOL/L — SIGNIFICANT CHANGE UP (ref 3.5–5.3)
PROT SERPL-MCNC: 6.6 G/DL — SIGNIFICANT CHANGE UP (ref 6–8.3)
RBC # BLD: 3.64 M/UL — LOW (ref 3.8–5.2)
RBC # FLD: 14.8 % — HIGH (ref 10.3–14.5)
SAO2 % BLDA: 93 % — SIGNIFICANT CHANGE UP (ref 92–96)
SARS-COV-2 RNA SPEC QL NAA+PROBE: SIGNIFICANT CHANGE UP
SODIUM SERPL-SCNC: 136 MMOL/L — SIGNIFICANT CHANGE UP (ref 135–145)
WBC # BLD: 6.75 K/UL — SIGNIFICANT CHANGE UP (ref 3.8–10.5)
WBC # FLD AUTO: 6.75 K/UL — SIGNIFICANT CHANGE UP (ref 3.8–10.5)

## 2020-09-08 PROCEDURE — 86900 BLOOD TYPING SEROLOGIC ABO: CPT

## 2020-09-08 PROCEDURE — 94660 CPAP INITIATION&MGMT: CPT

## 2020-09-08 PROCEDURE — 96375 TX/PRO/DX INJ NEW DRUG ADDON: CPT

## 2020-09-08 PROCEDURE — 99285 EMERGENCY DEPT VISIT HI MDM: CPT

## 2020-09-08 PROCEDURE — 93970 EXTREMITY STUDY: CPT

## 2020-09-08 PROCEDURE — 71045 X-RAY EXAM CHEST 1 VIEW: CPT

## 2020-09-08 PROCEDURE — 86850 RBC ANTIBODY SCREEN: CPT

## 2020-09-08 PROCEDURE — 84100 ASSAY OF PHOSPHORUS: CPT

## 2020-09-08 PROCEDURE — 86769 SARS-COV-2 COVID-19 ANTIBODY: CPT

## 2020-09-08 PROCEDURE — 83735 ASSAY OF MAGNESIUM: CPT

## 2020-09-08 PROCEDURE — 36415 COLL VENOUS BLD VENIPUNCTURE: CPT

## 2020-09-08 PROCEDURE — 93005 ELECTROCARDIOGRAM TRACING: CPT

## 2020-09-08 PROCEDURE — 83036 HEMOGLOBIN GLYCOSYLATED A1C: CPT

## 2020-09-08 PROCEDURE — 85730 THROMBOPLASTIN TIME PARTIAL: CPT

## 2020-09-08 PROCEDURE — 96374 THER/PROPH/DIAG INJ IV PUSH: CPT

## 2020-09-08 PROCEDURE — 80053 COMPREHEN METABOLIC PANEL: CPT

## 2020-09-08 PROCEDURE — 82607 VITAMIN B-12: CPT

## 2020-09-08 PROCEDURE — 82746 ASSAY OF FOLIC ACID SERUM: CPT

## 2020-09-08 PROCEDURE — 85610 PROTHROMBIN TIME: CPT

## 2020-09-08 PROCEDURE — 80048 BASIC METABOLIC PNL TOTAL CA: CPT

## 2020-09-08 PROCEDURE — 36600 WITHDRAWAL OF ARTERIAL BLOOD: CPT

## 2020-09-08 PROCEDURE — 84443 ASSAY THYROID STIM HORMONE: CPT

## 2020-09-08 PROCEDURE — 93306 TTE W/DOPPLER COMPLETE: CPT

## 2020-09-08 PROCEDURE — U0003: CPT

## 2020-09-08 PROCEDURE — 85027 COMPLETE CBC AUTOMATED: CPT

## 2020-09-08 PROCEDURE — 87635 SARS-COV-2 COVID-19 AMP PRB: CPT

## 2020-09-08 PROCEDURE — 82803 BLOOD GASES ANY COMBINATION: CPT

## 2020-09-08 PROCEDURE — 83880 ASSAY OF NATRIURETIC PEPTIDE: CPT

## 2020-09-08 PROCEDURE — 82962 GLUCOSE BLOOD TEST: CPT

## 2020-09-08 PROCEDURE — 80061 LIPID PANEL: CPT

## 2020-09-08 PROCEDURE — 86901 BLOOD TYPING SEROLOGIC RH(D): CPT

## 2020-09-08 RX ORDER — FUROSEMIDE 40 MG
1 TABLET ORAL
Qty: 15 | Refills: 0
Start: 2020-09-08 | End: 2020-10-07

## 2020-09-08 RX ORDER — ATORVASTATIN CALCIUM 80 MG/1
1 TABLET, FILM COATED ORAL
Qty: 0 | Refills: 0 | DISCHARGE
Start: 2020-09-08

## 2020-09-08 RX ORDER — ALBUTEROL 90 UG/1
1 AEROSOL, METERED ORAL
Qty: 0 | Refills: 0 | DISCHARGE
Start: 2020-09-08

## 2020-09-08 RX ORDER — AMLODIPINE BESYLATE 2.5 MG/1
1 TABLET ORAL
Qty: 0 | Refills: 0 | DISCHARGE
Start: 2020-09-08

## 2020-09-08 RX ADMIN — CHLORHEXIDINE GLUCONATE 1 APPLICATION(S): 213 SOLUTION TOPICAL at 05:22

## 2020-09-08 RX ADMIN — Medication 2 DROP(S): at 05:22

## 2020-09-08 RX ADMIN — Medication 1 DROP(S): at 05:29

## 2020-09-08 RX ADMIN — Medication 81 MILLIGRAM(S): at 12:26

## 2020-09-08 RX ADMIN — Medication 2 DROP(S): at 17:59

## 2020-09-08 RX ADMIN — Medication 1 MILLIGRAM(S): at 12:26

## 2020-09-08 RX ADMIN — Medication 100 MICROGRAM(S): at 05:23

## 2020-09-08 RX ADMIN — PANTOPRAZOLE SODIUM 40 MILLIGRAM(S): 20 TABLET, DELAYED RELEASE ORAL at 12:26

## 2020-09-08 RX ADMIN — Medication 40 MILLIGRAM(S): at 05:22

## 2020-09-08 NOTE — DISCHARGE NOTE PROVIDER - HOSPITAL COURSE
80F with significant medical history of HTN, folate deficiency anemia, GERD, LBP, COPD pressure ulcers, OA, ulcerative colitis, hypothyroidism is sent to ED from [facility] with c/o right eye swelling and dyspnea. Patient reports that her right eye selling has worsened from before. She has increased lacrimal discharge from both eyes. She also has difficulty breathing which has been worsening. She has been sitting up all day and night as she is having difficulty breathing. She states she feels comfortable sitting up with pillow at her back to support her. But says she got used to the dyspnea. She denies any eye pain. She reports that she has had this swelling before and was told to wear an eye patch which helped but she did not wear it this time. She denies fever, chest pain, nausea, vomiting, headache or other symptoms.         INTERVAL HPI/OVERNIGHT EVENTS: NAEON. Elevated BPs to 190s early am, gave additional 5mg Norvasc and increased daily dose to 5mg. VS otherwise wnl. Patient refused BiPAP overnight 2/2 discomfort. On examination this am patient sitting hunched in bed. Reports feeling well other than ongoing swelling in her right eyelid. Denies SOB, cough, or pain. Continuing cipro eye drops. On albuterol and solumedrol 40mg Qd.     9/06  Transferred to SCU    LABS:    CBC Full  -  ( 08 Sep 2020 06:23 )    WBC Count : 6.75 K/uL    RBC Count : 3.64 M/uL    Hemoglobin : 11.0 g/dL    Hematocrit : 36.2 %    Platelet Count - Automated : 177 K/uL    Mean Cell Volume : 99.5 fl    Mean Cell Hemoglobin : 30.2 pg    Mean Cell Hemoglobin Concentration : 30.4 gm/dL    Auto Neutrophil # : x    Auto Lymphocyte # : x    Auto Monocyte # : x    Auto Eosinophil # : x    Auto Basophil # : x    Auto Neutrophil % : x    Auto Lymphocyte % : x    Auto Monocyte % : x    Auto Eosinophil % : x    Auto Basophil % : x        09-08        136  |  85<L>  |  47<H>    ----------------------------<  82    4.5   |  49<HH>  |  2.07<H>        Ca    8.6      08 Sep 2020 06:23    Phos  3.6     09-08    Mg     2.5     09-08        TPro  6.6  /  Alb  3.2<L>  /  TBili  0.9  /  DBili  x   /  AST  19  /  ALT  16  /  AlkPhos  63  09-08    COVID-19 PCR: NotDetec: Testing is performed using polymerase chain reaction (PCR) or    transcription mediated amplification (TMA). This COVID-19 (SARS-CoV-2)    nucleic acid amplification test was validated by [employer] and is    in use under the FDA Emergency Use Authorization (EUA) for clinical labs    CLIA-certified to perform high complexity testing. Test results should be    correlated with clinical presentation, patient history, and epidemiology. (09.07.20 @ 16:21)    < from: Xray Chest 1 View-PORTABLE IMMEDIATE (09.03.20 @ 18:02) >        Shallow inspiration crowds the chest.        Heart may be enlarged.        There is a mild to moderate central congestion in the lungs increased from June 16.        IMPRESSION: Increasing CHF.            < end of copied text > 80F with significant medical history of HTN, folate deficiency anemia, GERD, LBP, COPD pressure ulcers, OA, ulcerative colitis, hypothyroidism is sent to ED from [facility] with c/o right eye swelling and dyspnea. Patient reports that her right eye selling has worsened from before. She has increased lacrimal discharge from both eyes. She also has difficulty breathing which has been worsening. She has been sitting up all day and night as she is having difficulty breathing. She states she feels comfortable sitting up with pillow at her back to support her. But says she got used to the dyspnea. She denies any eye pain. She reports that she has had this swelling before and was told to wear an eye patch which helped but she did not wear it this time. She denies fever, chest pain, nausea, vomiting, headache or other symptoms.         INTERVAL HPI/OVERNIGHT EVENTS: NAEON. Elevated BPs to 190s early am, gave additional 5mg Norvasc and increased daily dose to 5mg. VS otherwise wnl. Patient refused BiPAP overnight 2/2 discomfort. On examination this am patient sitting hunched in bed. Reports feeling well other than ongoing swelling in her right eyelid. Denies SOB, cough, or pain. Continuing cipro eye drops. On albuterol and solumedrol 40mg Qd.     9/06  Transferred to SCU    LABS:    CBC Full  -  ( 08 Sep 2020 06:23 )    WBC Count : 6.75 K/uL    RBC Count : 3.64 M/uL    Hemoglobin : 11.0 g/dL    Hematocrit : 36.2 %    Platelet Count - Automated : 177 K/uL    Mean Cell Volume : 99.5 fl    Mean Cell Hemoglobin : 30.2 pg    Mean Cell Hemoglobin Concentration : 30.4 gm/dL    Auto Neutrophil # : x    Auto Lymphocyte # : x    Auto Monocyte # : x    Auto Eosinophil # : x    Auto Basophil # : x    Auto Neutrophil % : x    Auto Lymphocyte % : x    Auto Monocyte % : x    Auto Eosinophil % : x    Auto Basophil % : x        09-08        136  |  85<L>  |  47<H>    ----------------------------<  82    4.5   |  49<HH>  |  2.07<H>        Ca    8.6      08 Sep 2020 06:23    Phos  3.6     09-08    Mg     2.5     09-08        TPro  6.6  /  Alb  3.2<L>  /  TBili  0.9  /  DBili  x   /  AST  19  /  ALT  16  /  AlkPhos  63  09-08    COVID-19 PCR: NotDetec: Testing is performed using polymerase chain reaction (PCR) or    transcription mediated amplification (TMA). This COVID-19 (SARS-CoV-2)    nucleic acid amplification test was validated by "[employer] and is    in use under the FDA Emergency Use Authorization (EUA) for clinical labs    CLIA-certified to perform high complexity testing. Test results should be    correlated with clinical presentation, patient history, and epidemiology. (09.07.20 @ 16:21)    < from: Xray Chest 1 View-PORTABLE IMMEDIATE (09.03.20 @ 18:02) >        Shallow inspiration crowds the chest.        Heart may be enlarged.        There is a mild to moderate central congestion in the lungs increased from June 16.        IMPRESSION: Increasing CHF.            < end of copied text >    for a full account of hospital course please refer to actual medical records for this is a brief summery

## 2020-09-08 NOTE — DISCHARGE NOTE PROVIDER - NSDCMRMEDTOKEN_GEN_ALL_CORE_FT
albuterol 90 mcg/inh inhalation aerosol: 1 puff(s) inhaled every 4 hours  amLODIPine 5 mg oral tablet: 1 tab(s) orally once a day  Aspir 81 oral delayed release tablet: 1 tab(s) orally once a day  atorvastatin 40 mg oral tablet: 1 tab(s) orally once a day (at bedtime)  diphenhydrAMINE 25 mg oral capsule: 1 cap(s) orally once a day (at bedtime)  folic acid 1 mg oral tablet: 1 tab(s) orally once a day  Icy Hot Lidocaine Plus Menthol 4%-1% topical film: Apply topically to affected area once a day  Lasix 40 mg oral tablet: 1 tab(s) orally every other day   levothyroxine 100 mcg (0.1 mg) oral tablet: 1 tab(s) orally once a day  Multiple Vitamins with Minerals oral tablet: 1 tab(s) orally once a day  ocular lubricant ophthalmic solution: 1 drop(s) to each affected eye 2 times a day  predniSONE 10 mg oral tablet: 4 orally once a day x 2 days  3 orally once a day x 2 days  2 orally once a day x 2 days  1 orally once a day x 2 days  Proctocream-HC 2.5% rectal cream with applicator: 1 application rectal once a day (at bedtime)  Protonix 40 mg oral delayed release tablet: 1 tab(s) orally once a day  senna oral tablet: 2 tab(s) orally once a day (at bedtime) as needed for constipation   Tylenol 325 mg oral tablet: 2 tab(s) orally every 4 hours  Vitamin D3 1000 intl units (25 mcg) oral tablet: 1 tab(s) orally once a day

## 2020-09-08 NOTE — PROGRESS NOTE ADULT - SUBJECTIVE AND OBJECTIVE BOX
KEELY GOMEZ    SCU progress note    INTERVAL HPI/OVERNIGHT EVENTS: ***refused BIPAP over night. Remains hypercapnic    DNR [x ]   DNI  [x  ]    Covid - 19 PCR:     The 4Ms    What Matters Most: see GOC  Age appropriate Medications/Screen for High Risk Medication: Yes  Mentation: see CAM below  Mobility: defer to physical exam    The Confusion Assessment Method (CAM) Diagnostic Algorithm     1: Acute Onset or Fluctuating Course  - Is there evidence of an acute change in mental status from the patient’s baseline? Did the (abnormal) behavior  fluctuate during the day, that is, tend to come and go, or increase and decrease in severity?  [ ] YES [x ] NO     2: Inattention  - Did the patient have difficulty focusing attention, being easily distractible, or having difficulty keeping track of what was being said?  [ ] YES [x ] NO     3: Disorganized thinking  -Was the patient’s thinking disorganized or incoherent, such as rambling or irrelevant conversation, unclear or illogical flow of ideas, or unpredictable switching from subject to subject?  [ ] YES [x ] NO    4: Altered Level of consciousness?  [ ] YES [x ] NO    The diagnosis of delirium by CAM requires the presence of features 1 and 2 and either 3 or 4.    PRESSORS: [ ] YES [x ] NO    Cardiovascular:  Heart Failure  Acute   Acute on Chronic  Chronic       amLODIPine   Tablet 5 milliGRAM(s) Oral daily    Pulmonary:  ALBUTerol    90 MICROgram(s) HFA Inhaler 1 Puff(s) Inhalation every 4 hours    Hematalogic:  aspirin enteric coated 81 milliGRAM(s) Oral daily  heparin   Injectable 5000 Unit(s) SubCutaneous every 8 hours    Other:  artificial  tears Solution 1 Drop(s) Both EYES two times a day  atorvastatin 40 milliGRAM(s) Oral at bedtime  chlorhexidine 2% Cloths 1 Application(s) Topical <User Schedule>  ciprofloxacin  0.3% Ophthalmic Solution 2 Drop(s) Right EYE two times a day  folic acid 1 milliGRAM(s) Oral daily  hydrocortisone 2.5% Rectal Cream 1 Application(s) Rectal at bedtime  levothyroxine 100 MICROGram(s) Oral daily  pantoprazole  Injectable 40 milliGRAM(s) IV Push daily  senna 2 Tablet(s) Oral at bedtime    ALBUTerol    90 MICROgram(s) HFA Inhaler 1 Puff(s) Inhalation every 4 hours  amLODIPine   Tablet 5 milliGRAM(s) Oral daily  artificial  tears Solution 1 Drop(s) Both EYES two times a day  aspirin enteric coated 81 milliGRAM(s) Oral daily  atorvastatin 40 milliGRAM(s) Oral at bedtime  chlorhexidine 2% Cloths 1 Application(s) Topical <User Schedule>  ciprofloxacin  0.3% Ophthalmic Solution 2 Drop(s) Right EYE two times a day  folic acid 1 milliGRAM(s) Oral daily  heparin   Injectable 5000 Unit(s) SubCutaneous every 8 hours  hydrocortisone 2.5% Rectal Cream 1 Application(s) Rectal at bedtime  levothyroxine 100 MICROGram(s) Oral daily  pantoprazole  Injectable 40 milliGRAM(s) IV Push daily  senna 2 Tablet(s) Oral at bedtime    Drug Dosing Weight  Height (cm): 162.56 (16 Jun 2020 13:54)  Weight (kg): 77.7 (03 Sep 2020 15:15)  BMI (kg/m2): 29.4 (03 Sep 2020 15:15)  BSA (m2): 1.83 (03 Sep 2020 15:15)    CENTRAL LINE: [ ] YES [x ] NO  LOCATION:   DATE INSERTED:  REMOVE: [ ] YES [ ] NO  EXPLAIN:    AUGUST: [ ] YES [x ] NO    DATE INSERTED:  REMOVE:  [ ] YES [ ] NO  EXPLAIN:    PAST MEDICAL & SURGICAL HISTORY:  Pressure ulcer  Pain, low back  Ulcerative colitis  Osteoarthritis  Hypothyroidism  GERD (gastroesophageal reflux disease)  Hypertension  History of ankle surgery  H/O: hysterectomy        ABG - ( 08 Sep 2020 06:19 )  pH, Arterial: 7.43  pH, Blood: x     /  pCO2: 73    /  pO2: 66    / HCO3: 48    / Base Excess: 20.1  /  SaO2: 93                          PHYSICAL EXAM:    GENERAL: NAD, obese  HEAD:  Atraumatic, Normocephalic  EYES: EOMI, PERRLA, conjunctiva and sclera clear  ENMT: No tonsillar erythema, exudates  NECK: Supple, No JVD  NERVOUS SYSTEM:  Alert & Oriented X3, Follows simple commands. Moving all extremities  CHEST/LUNG: Diminished breath sounds bilateral bases  HEART: Regular rate and rhythm; No murmurs, rubs, or gallops  ABDOMEN: Soft, Nontender, Nondistended; Bowel sounds present  EXTREMITIES:  2+ Peripheral Pulses, No clubbing, cyanosis, or edema  LYMPH: No lymphadenopathy noted  SKIN: No rashes or lesions  +Severe kyphoscoliosis  +Severe cervical lordosis    LABS:  CBC Full  -  ( 08 Sep 2020 06:23 )  WBC Count : 6.75 K/uL  RBC Count : 3.64 M/uL  Hemoglobin : 11.0 g/dL  Hematocrit : 36.2 %  Platelet Count - Automated : 177 K/uL  Mean Cell Volume : 99.5 fl  Mean Cell Hemoglobin : 30.2 pg  Mean Cell Hemoglobin Concentration : 30.4 gm/dL  Auto Neutrophil # : x  Auto Lymphocyte # : x  Auto Monocyte # : x  Auto Eosinophil # : x  Auto Basophil # : x  Auto Neutrophil % : x  Auto Lymphocyte % : x  Auto Monocyte % : x  Auto Eosinophil % : x  Auto Basophil % : x    09-08    136  |  85<L>  |  47<H>  ----------------------------<  82  4.5   |  49<HH>  |  2.07<H>    Ca    8.6      08 Sep 2020 06:23  Phos  3.6     09-08  Mg     2.5     09-08    TPro  6.6  /  Alb  3.2<L>  /  TBili  0.9  /  DBili  x   /  AST  19  /  ALT  16  /  AlkPhos  63  09-08              [  ]  DVT Prophylaxis  [  ]  Nutrition, Brand, Rate         Goal Rate        Abnormal Nutritional Status -  Malnutrition   Cachexia      Morbid Obesity BMI >/=40    RADIOLOGY & ADDITIONAL STUDIES:  ***  < from: Xray Chest 1 View-PORTABLE IMMEDIATE (09.03.20 @ 18:02) >  INTERPRETATION:  AP semisupine chest on September 3, 2020 at 5:55 PM. Patient has increasing CO2 level. Patient was positive for the Covid virus on June 18 with negative tests up to June 23. Patient has facial swelling possibly from allergic reaction. Patient has renal disease and respiratory failure. Patient has COPD. Patient has hypertension. Patient's chin obscures the apices.    Shallow inspiration crowds the chest.    Heart may be enlarged.    There is a mild to moderate central congestion in the lungs increased from June 16.    IMPRESSION: Increasing CHF.    < end of copied text >    Goals of Care Discussion with Family/Proxy/Other   DNR/DNI

## 2020-09-08 NOTE — PROGRESS NOTE ADULT - PROBLEM SELECTOR PLAN 8
Has severe positional deformity  Scoliosis, difficulty in changing position
Severe. Chin rests on chest.  Monitor oxygen saturation.   Continue BIPAP nightly. NIV ordered.
Severe. Chin rests on chest.  Monitor oxygen saturation.

## 2020-09-08 NOTE — PROGRESS NOTE ADULT - PROBLEM SELECTOR PLAN 7
Severe.  Continue BIPAP nightly and as needed during the day.  Keep head of bed elevated.  NIV ordered

## 2020-09-08 NOTE — DISCHARGE NOTE PROVIDER - NSDCCPCAREPLAN_GEN_ALL_CORE_FT
PRINCIPAL DISCHARGE DIAGNOSIS  Diagnosis: Acute on chronic respiratory failure with hypoxia and hypercapnia  Assessment and Plan of Treatment: Acute on chronic respiratory failure with hypoxia and hypercapnia  Continue oxygen. NIV/Trilogy at night and as needed during the day. Settings are pre-set for patient.      SECONDARY DISCHARGE DIAGNOSES  Diagnosis: SAUL (acute kidney injury)  Assessment and Plan of Treatment: SAUL (acute kidney injury)  Lasix changed to every other day. Other diuretics discontinued.  Repeat BMP 1 week after discharged.    Diagnosis: Hypertension  Assessment and Plan of Treatment: Hypertension  Continue medications as prescribed.    Diagnosis: CHF (congestive heart failure)  Assessment and Plan of Treatment: CHF (congestive heart failure)  Continue lasix every other day.  Continue amlodipine.    Diagnosis: Hypothyroidism  Assessment and Plan of Treatment: Hypothyroidism  Continue synthroid as directed    Diagnosis: Anemia, chronic disease  Assessment and Plan of Treatment: Anemia, chronic disease  Have CBC monitored while in facility    Diagnosis: Kyphoscoliosis  Assessment and Plan of Treatment: Kyphoscoliosis  Continue oxygen. NIV/Trilogy at night.

## 2020-09-08 NOTE — PROGRESS NOTE ADULT - SUBJECTIVE AND OBJECTIVE BOX
Patient is a 80y old  Female who presents with a chief complaint of Dyspnea (07 Sep 2020 15:54)    PATIENT IS SEEN AND EXAMINED IN MEDICAL FLOOR.    ALLERGIES:  iodine containing compounds (Unknown)  Milk (Unknown)  morphine (Unknown)  Orange Juice (Unknown)  Rice (Unknown)    Daily     Daily Weight in k.5 (08 Sep 2020 04:59)    VITALS:    Vital Signs Last 24 Hrs  T(C): 36.7 (08 Sep 2020 04:59), Max: 36.8 (07 Sep 2020 20:44)  T(F): 98 (08 Sep 2020 04:59), Max: 98.2 (07 Sep 2020 20:44)  HR: 93 (08 Sep 2020 08:32) (87 - 102)  BP: 98/54 (08 Sep 2020 04:59) (98/54 - 120/64)  BP(mean): --  RR: 16 (08 Sep 2020 04:59) (16 - 22)  SpO2: 104% (08 Sep 2020 08:32) (97% - 104%)    LABS:    CBC Full  -  ( 08 Sep 2020 06:23 )  WBC Count : 6.75 K/uL  RBC Count : 3.64 M/uL  Hemoglobin : 11.0 g/dL  Hematocrit : 36.2 %  Platelet Count - Automated : 177 K/uL  Mean Cell Volume : 99.5 fl  Mean Cell Hemoglobin : 30.2 pg  Mean Cell Hemoglobin Concentration : 30.4 gm/dL  Auto Neutrophil # : x  Auto Lymphocyte # : x  Auto Monocyte # : x  Auto Eosinophil # : x  Auto Basophil # : x  Auto Neutrophil % : x  Auto Lymphocyte % : x  Auto Monocyte % : x  Auto Eosinophil % : x  Auto Basophil % : x    PT/INR - ( 06 Sep 2020 13:48 )   PT: 12.1 sec;   INR: 1.04 ratio         PTT - ( 06 Sep 2020 13:48 )  PTT:60.1 sec  08    136  |  85<L>  |  47<H>  ----------------------------<  82  4.5   |  49<HH>  |  2.07<H>    Ca    8.6      08 Sep 2020 06:23  Phos  3.6     09-08  Mg     2.5     09-08    TPro  6.6  /  Alb  3.2<L>  /  TBili  0.9  /  DBili  x   /  AST  19  /  ALT  16  /  AlkPhos  63  09-08    CAPILLARY BLOOD GLUCOSE    POCT Blood Glucose.: 102 mg/dL (08 Sep 2020 06:11)  POCT Blood Glucose.: 90 mg/dL (08 Sep 2020 00:14)  POCT Blood Glucose.: 94 mg/dL (07 Sep 2020 17:15)      LIVER FUNCTIONS - ( 08 Sep 2020 06:23 )  Alb: 3.2 g/dL / Pro: 6.6 g/dL / ALK PHOS: 63 U/L / ALT: 16 U/L DA / AST: 19 U/L / GGT: x           Creatinine Trend: 2.07<--, 1.41<--, 1.22<--, 1.14<--, 1.09<--, 1.14<--  I&O's Summary      ABG - ( 08 Sep 2020 06:19 )  pH, Arterial: 7.43  pH, Blood: x     /  pCO2: 73    /  pO2: 66    / HCO3: 48    / Base Excess: 20.1  /  SaO2: 93          .Blood Blood-Peripheral   @ 09:52   No Growth Final  --  --      .Urine Clean Catch (Midstream)   @ 01:00   No growth  --  --    MEDICATIONS:    MEDICATIONS  (STANDING):  ALBUTerol    90 MICROgram(s) HFA Inhaler 1 Puff(s) Inhalation every 4 hours  amLODIPine   Tablet 5 milliGRAM(s) Oral daily  artificial  tears Solution 1 Drop(s) Both EYES two times a day  aspirin enteric coated 81 milliGRAM(s) Oral daily  atorvastatin 40 milliGRAM(s) Oral at bedtime  chlorhexidine 2% Cloths 1 Application(s) Topical <User Schedule>  ciprofloxacin  0.3% Ophthalmic Solution 2 Drop(s) Right EYE two times a day  folic acid 1 milliGRAM(s) Oral daily  heparin   Injectable 5000 Unit(s) SubCutaneous every 8 hours  hydrocortisone 2.5% Rectal Cream 1 Application(s) Rectal at bedtime  levothyroxine 100 MICROGram(s) Oral daily  pantoprazole  Injectable 40 milliGRAM(s) IV Push daily  senna 2 Tablet(s) Oral at bedtime      MEDICATIONS  (PRN):      REVIEW OF SYSTEMS:                           ALL ROS DONE [ X   ]    CONSTITUTIONAL:  LETHARGIC [   ], FEVER [   ], UNRESPONSIVE [   ]  CVS:  CP  [   ], SOB, [   ], PALPITATIONS [   ], DIZZYNESS [   ]  RS: COUGH [   ], SPUTUM [   ]  GI: ABDOMINAL PAIN [   ], NAUSEA [   ], VOMITINGS [   ], DIARRHEA [   ], CONSTIPATION [   ]  :  DYSURIA [   ], NOCTURIA [   ], INCREASED FREQUENCY [   ], DRIBLING [   ],  SKELETAL: PAINFUL JOINTS [   ], SWOLLEN JOINTS [   ], NECK ACHE [   ], LOW BACK ACHE [   ],  SKIN : ULCERS [   ], RASH [   ], ITCHING [   ]  CNS: HEAD ACHE [   ], DOUBLE VISION [   ], BLURRED VISION [   ], AMS / CONFUSION [   ], SEIZURES [   ], WEAKNESS [   ],TINGLING / NUMBNESS [   ]    PHYSICAL EXAMINATION:  GENERAL APPEARANCE: NO DISTRESS  HEENT:  NO PALLOR, NO  JVD,  NO NODES, NECK SUPPLE   BILATERAL EYES WITH CRUSTING  CVS: S1 +, S2 +,   RS: AEEB,  OCCASIONAL  RALES +,   NO RONCHI  ABD: SOFT, NT, NO, BS +  EXT: PE  SKIN: WARM, DRY  SKELETAL:  ROM ACCEPTABLE  CNS:  AAO X 2 ,   DEFICITS    RADIOLOGY :    < from: Xray Chest 1 View-PORTABLE IMMEDIATE (20 @ 18:02) >  INTERPRETATION:  AP semisupine chest on September 3, 2020 at 5:55 PM. Patient has increasing CO2 level. Patient was positive for the Covid virus on  with negative tests up to . Patient has facial swelling possibly from allergic reaction. Patient has renal disease and respiratory failure. Patient has COPD. Patient has hypertension. Patient's chin obscures the apices.    Shallow inspiration crowds the chest.    Heart may be enlarged.    There is a mild to moderate central congestion in the lungs increased from .    IMPRESSION: Increasing CHF.    < end of copied text >      < from: US Duplex Venous Lower Ext Complete, Bilateral (20 @ 13:13) >    IMPRESSION:  No evidence of deep venous thrombosis in either lower extremity.    < end of copied text >    < from: Transthoracic Echocardiogram (20 @ 06:57) >  CONCLUSIONS:  1. Mitral annular calcification, and calcifiedmitral  leaflets with normal diastolic opening.  2. Calcified trileaflet aortic valve with normal opening.  Trace aortic regurgitation.  3. Aortic Root: 3.2 cm.  4. Normal left atrium.  LA volume index = 24 cc/m2.  5. Mild concentric left ventricularhypertrophy.  6. Normal Left Ventricular Systolic Function,  (EF = 55 to  60%)  7. Grade II diastolic dysfunction.  8. Normal right atrium.  9. Normal right ventricular size and systolic function  (TAPSE  1.8cm).  10. RV systolic pressure is normal at  26 mm Hg.  11. There is trace tricuspid regurgitation.  12. Pulmonic valve not well seen.  13. Normal pericardium with no pericardial effusion.    < end of copied text >    ASSESSMENT :     Other abnormality of breathing  Yes  Pressure ulcer  Pain, low back  Ulcerative colitis  Osteoarthritis  Hypothyroidism  GERD (gastroesophageal reflux disease)  Hypertension  History of ankle surgery  H/O: hysterectomy      PLAN:  HPI:  80F with significant medical history of HTN, folate deficiency anemia, GERD, LBP, COPD pressure ulcers, OA, ulcerative colitis, hypothyroidism is sent to ED from [facility] with c/o right eye swelling and dyspnea. Patient reports that her right eye selling has worsened from before. She has increased lacrimal discharge from both eyes. She also has difficulty breathing which has been worsening. She has been sitting up all day and night as she is having difficulty breathing. She states she feels comfortable sitting up with pillow at her back to support her. But says she got used to the dyspnea. She denies any eye pain. She reports that she has had this swelling before and was told to wear an eye patch which helped but she did not wear it this time. She denies fever, chest pain, nausea, vomiting, headache or other symptoms. (04 Sep 2020 01:18)      # ACUTE ON CHRONIC HYPOXIC HYPERCAPNIC RESPIRATORY FAILURE S/T SUSPECTED ARIN/OHS W/ COPD AND SUSPECTED CHF [ELEVATED PRO-BNP] w/ D2DD [PER 2020 ECHOCARDIOGRAM] - ON TRILOGY QHS, HOLD LASIX 40MG QDAY, STRICT IS AND OS, PULMONOLOGY CONSULT IN PROGRESS; DC METOLAZONE  # SAUL - DC METOLAZONE AND HOLD FUROSEMIDE FOR 3 DAYS; MONITOR RENAL FUNCTION  # SUSPECT COPD EXACERBATION - PLACED ON IV STEROIDS, ALBUTEROL INHALER  # ACUTE METABOLIC ENCEPHALOPATHY - IMPROVED - SUSPECTED S/T HYPERCAPNIA - MONITOR  # SUSPECTED CONJUNCTIVITIS - IMPROVED - ON ERYTHROMYCIN DROPS  # ULCERATIVE COLITIS  # HYPOTHYROIDISM - RESUME LEVOTHYROXINE  # HTN  # CHRONIC MACROCYTIC ANEMIA   # OA  # DC TO [facility] WHEN MEDICALLY STABLE AND TRILOGY DEVICE IS ARRANGED  # ATTEMPTED TO CALL SONMATTHEW AT NO. [phone] ON 2020 @ 7PM AND LEFT VM  # GI AND DVT PPX    NADINE YETURU M.D. COVERING LETHA MONTOYA M.D.

## 2020-09-08 NOTE — PROGRESS NOTE ADULT - ASSESSMENT
80F with significant medical history of HTN, folate deficiency anemia, GERD, LBP, COPD pressure ulcers, OA, ulcerative colitis, hypothyroidism is sent to ED from [facility] with c/o right eye swelling and dyspnea. Patient reports that her right eye selling has worsened from before. She has increased lacrimal discharge from both eyes. She also has difficulty breathing which has been worsening. She has been sitting up all day and night as she is having difficulty breathing. She states she feels comfortable sitting up with pillow at her back to support her. But says she got used to the dyspnea. She denies any eye pain. She reports that she has had this swelling before and was told to wear an eye patch which helped but she did not wear it this time. She denies fever, chest pain, nausea, vomiting, headache or other symptoms.     INTERVAL HPI/OVERNIGHT EVENTS: NAEON. Elevated BPs to 190s early am, gave additional 5mg Norvasc and increased daily dose to 5mg. VS otherwise wnl. Patient refused BiPAP overnight 2/2 discomfort. On examination this am patient sitting hunched in bed. Reports feeling well other than ongoing swelling in her right eyelid. Denies SOB, cough, or pain. Continuing cipro eye drops. On albuterol and solumedrol 40mg Qd.   9/06  Transferred to SCU  9/07  NIV ordered for patient  9/08  NIV approved

## 2020-09-08 NOTE — PROGRESS NOTE ADULT - PROBLEM SELECTOR PROBLEM 1
Acute respiratory failure
Acute on chronic respiratory failure with hypoxia and hypercapnia
Acute on chronic respiratory failure with hypoxia and hypercapnia

## 2020-09-08 NOTE — PROGRESS NOTE ADULT - PROVIDER SPECIALTY LIST ADULT
Critical Care
Internal Medicine
Critical Care
Internal Medicine

## 2020-09-08 NOTE — PROGRESS NOTE ADULT - NSHPATTENDINGPLANDISCUSS_GEN_ALL_CORE
PATIENT AND FLOOR STAFF
icu team on rounds
icu team on rounds
Agree with above assessment and plan as transcribed.
Agree with above assessment and plan as transcribed.

## 2020-09-08 NOTE — PROGRESS NOTE ADULT - ATTENDING COMMENTS
IMP: This is an 80 yr old woman  with significant medical history of HTN, folate deficiency anemia, GERD, LBP, COPD pressure ulcers, OA, ulcerative colitis, hypothyroidism is sent to ED from [facility] with c/o right eye swelling and dyspnea. Admitted to medicine for respiratory failure secondary to CHF exacerbation.  ICU was consulted for worsening shortness of breath  requiring BiPAP. AMS/ Encephalopathy due to hypercapnia        Assessment;  - Acute on chronic Hypoxic respiratory failure with Hypercapnia  - Acute Exa of COPD  - Encephalopathy   - CHF exacerbation  - COPD  - Eye swelling  - SAUL  - Respiratory acidosis  - Former smoker       Plan:  -transfer to icu.. new BiPaP / hosp policy regarding new BiPaP  -Hypercapnia due to a combination of COPD and possible OHS/ ARIN  -continue BiPaP at night   -mental status improving   -iv steroids  -bronchodilators  -diuresis  -monitor cardiac enzymes  -hemodynamic monitoring   -dvt/gi prophy .
IMP: This is an 80 yr old woman  with significant medical history of HTN, folate deficiency anemia, GERD, LBP, COPD pressure ulcers, OA, ulcerative colitis, hypothyroidism is sent to ED from [facility] with c/o right eye swelling and dyspnea. Admitted to medicine for respiratory failure secondary to CHF exacerbation.  ICU was consulted for worsening shortness of breath  requiring BiPAP. AMS/ Encephalopathy due to hypercapnia        Assessment;  - Acute on chronic Hypoxic respiratory failure with Hypercapnia  - Acute Exa of COPD  - Encephalopathy   - CHF exacerbation  - COPD  - Eye swelling  - SAUL  - Respiratory acidosis  - Former smoker       Plan:  -Hypercapnia due to a combination of COPD and possible OHS/ ARIN  -continue BiPaP at night   -mental status back to base line   -iv steroids taper  -bronchodilators  -diuresis  -monitor cardiac enzymes  -hemodynamic monitoring   -dvt/gi prophy .
-continue BiPaP at night until Trilogy device is delivered  -out pat pul f/u
Acute on chronic respiratory failure with hypoxia and hypercapnia.  Plan: Acute resolving. Multifactorial: COPD, kyphoscoliosis and cervial lordosis.  Continue BIPAP nightly and as needed during the day. Will need NIV upon discharge because the patient remain hypercapnic despite BIPAP usage. Patient requires the Tidal volume that NIV will deliver. Without NIV patient is a high risk for intubation and readmission.  Continue oxygen.

## 2020-09-08 NOTE — PROGRESS NOTE ADULT - NUTRITIONAL ASSESSMENT
Morbidly obese.    Albumin 2.9-3.4  Moderate Protein malnutrition
Diet- DASH/ TLC
Morbidly obese.    Albumin 2.9-3.4  Moderate Protein malnutrition

## 2020-09-08 NOTE — PROGRESS NOTE ADULT - PROBLEM SELECTOR PLAN 10
DNR/DNI  No tube feedings  MOLST in chart
Continue GI prophylaxis.  Discharge planning.  Will need NIV upon discharge.  Repeat COVID test.
Continue GI prophylaxis.  Discharge planning.  Will need NIV upon discharge.  Repeat COVID test negative.

## 2020-09-08 NOTE — DISCHARGE NOTE NURSING/CASE MANAGEMENT/SOCIAL WORK - PATIENT PORTAL LINK FT
You can access the FollowMyHealth Patient Portal offered by [facility] by registering at the following website: [email or web address]. By joining [facility]’s FollowMyHealth portal, you will also be able to view your health information using other applications (apps) compatible with our system.

## 2020-12-11 NOTE — DISCHARGE NOTE PROVIDER - NSDCDCMDCOMP_GEN_ALL_CORE
This document is complete and the patient is ready for discharge. 44 y/o female p/w viral illness, likely COVID as  is positive. Will hydrate, treat headache, obtain labs, reassess.

## 2021-02-04 NOTE — DIETITIAN INITIAL EVALUATION ADULT. - EST PROTEIN NEEDS7
Outbound call to patient. Date of birth verified. Misunderstanding about patient's request for lab results. Patient clarified that the lab results she is referring to was the COVID results, which she already received. Understanding Verbalized. 93.24

## 2021-06-09 NOTE — DISCHARGE NOTE NURSING/CASE MANAGEMENT/SOCIAL WORK - NSDPFAC_GEN_ALL_CORE
[id number] [facility] Neurology Progress Note    Teresa Fisher Patient Status:  Observation    1945 MRN [id number]   [facility] 3NE-A Attending Yesy Aggarwal, [address] [facility] [address] PCP Raffi Guy       Chief Complaint:     Altered • allopurinol  100 mg Oral Daily   • aspirin  81 mg Oral Daily   • DULoxetine HCl  30 mg Oral BID   • gabapentin  600 mg Oral Nightly   • levetiracetam  1,000 mg Oral BID   • Metoprolol Succinate ER  100 mg Oral Daily   • Pantoprazole Sodium  40 mg Oral Acute on chronic renal insufficiency     Generalized non-convulsive epilepsy ([facility], [place] [address]     Confusion      Labs: keppra 2    Assessment/Plan:    Seizure likely secondary to missed doses of keppra   CKD  DM2  Hypertension   Dyslipidemia     Plan:  Seizure precau [facility]

## 2023-09-27 NOTE — ED ADULT NURSE NOTE - CCCP TRG CHIEF CMPLNT
facial swelling Azathioprine Pregnancy And Lactation Text: This medication is Pregnancy Category D and isn't considered safe during pregnancy. It is unknown if this medication is excreted in breast milk.

## 2023-10-25 NOTE — DISCHARGE NOTE NURSING/CASE MANAGEMENT/SOCIAL WORK - NSDCPETBCESMAN_GEN_ALL_CORE
If you are a smoker, it is important for your health to stop smoking. Please be aware that second hand smoke is also harmful. What Type Of Note Output Would You Prefer (Optional)?: Bullet Format How Severe Is Your Skin Lesion?: moderate Has Your Skin Lesion Been Treated?: not been treated Is This A New Presentation, Or A Follow-Up?: Skin Lesions

## 2023-10-31 RX ORDER — FUROSEMIDE 40 MG
1 TABLET ORAL
Qty: 0 | Refills: 0 | DISCHARGE

## 2023-10-31 RX ORDER — FOLIC ACID 0.8 MG
1 TABLET ORAL
Qty: 0 | Refills: 0 | DISCHARGE

## 2023-10-31 RX ORDER — ACETAMINOPHEN 500 MG
2 TABLET ORAL
Qty: 0 | Refills: 0 | DISCHARGE

## 2023-10-31 RX ORDER — METOLAZONE 5 MG/1
1 TABLET ORAL
Qty: 0 | Refills: 0 | DISCHARGE

## 2023-10-31 RX ORDER — HYDROCORTISONE 1 %
1 OINTMENT (GRAM) TOPICAL
Qty: 0 | Refills: 0 | DISCHARGE

## 2023-10-31 RX ORDER — ASPIRIN/CALCIUM CARB/MAGNESIUM 324 MG
1 TABLET ORAL
Qty: 0 | Refills: 0 | DISCHARGE

## 2023-10-31 RX ORDER — PANTOPRAZOLE SODIUM 20 MG/1
1 TABLET, DELAYED RELEASE ORAL
Qty: 0 | Refills: 0 | DISCHARGE

## 2023-10-31 RX ORDER — LEVOTHYROXINE SODIUM 125 MCG
1 TABLET ORAL
Qty: 0 | Refills: 0 | DISCHARGE

## 2023-10-31 RX ORDER — DIPHENHYDRAMINE HCL 50 MG
1 CAPSULE ORAL
Qty: 0 | Refills: 0 | DISCHARGE

## 2023-10-31 RX ORDER — CHOLECALCIFEROL (VITAMIN D3) 125 MCG
1 CAPSULE ORAL
Qty: 0 | Refills: 0 | DISCHARGE

## 2023-10-31 RX ORDER — MULTIVIT-MIN/FERROUS GLUCONATE 9 MG/15 ML
1 LIQUID (ML) ORAL
Qty: 0 | Refills: 0 | DISCHARGE

## 2023-10-31 RX ORDER — AMLODIPINE BESYLATE 2.5 MG/1
1 TABLET ORAL
Qty: 0 | Refills: 0 | DISCHARGE

## 2023-10-31 RX ORDER — PREDNISOLONE SODIUM PHOSPHATE 1 %
1 DROPS OPHTHALMIC (EYE)
Qty: 0 | Refills: 0 | DISCHARGE

## 2023-10-31 RX ORDER — MENTHOL 16 G/100G
1 CREAM TOPICAL
Qty: 0 | Refills: 0 | DISCHARGE

## 2025-02-17 NOTE — H&P ADULT - ATTENDING COMMENTS
Juanita griffith [facility] called for RN to RN report. Juanita RN will speak to their MD to see if patient is accepted.   
Patient ambulating to restroom with steady gait   
Pt now seen sound asleep in bed. Breathing even, regular  
Pt talking to mother VIA spec phone. Per pt, boyfriend not to be allowed back to see patient.    Pt calm and cooperative; lying up right in cart no distress noted. Pt now awake. Breakfast ordered at this time. No acute events from 0700 to 1030.   
Report received from Ramses Hunter in direct line of sight of patient. Patient with easy respirations, equal chest rise and fall.   
Metoprolol, Prednisone, aspirin
CASE D/W ER MD AND RESIDENT TEAM.    NADINE MONTOYA M.D. COVERING FOR LETHA MONTOYA M.D.